# Patient Record
Sex: FEMALE | Race: AMERICAN INDIAN OR ALASKA NATIVE | HISPANIC OR LATINO | Employment: PART TIME | URBAN - METROPOLITAN AREA
[De-identification: names, ages, dates, MRNs, and addresses within clinical notes are randomized per-mention and may not be internally consistent; named-entity substitution may affect disease eponyms.]

---

## 2022-03-16 ENCOUNTER — LAB REQUISITION (OUTPATIENT)
Dept: LAB | Facility: CLINIC | Age: 27
End: 2022-03-16

## 2022-03-16 PROCEDURE — 87340 HEPATITIS B SURFACE AG IA: CPT | Performed by: INTERNAL MEDICINE

## 2022-03-16 PROCEDURE — 86706 HEP B SURFACE ANTIBODY: CPT | Performed by: INTERNAL MEDICINE

## 2022-03-16 PROCEDURE — 86481 TB AG RESPONSE T-CELL SUSP: CPT | Performed by: INTERNAL MEDICINE

## 2022-03-17 LAB
HBV SURFACE AB SERPL IA-ACNC: 363.73 M[IU]/ML
HBV SURFACE AG SERPL QL IA: NONREACTIVE

## 2022-03-18 LAB
GAMMA INTERFERON BACKGROUND BLD IA-ACNC: 0.03 IU/ML
M TB IFN-G BLD-IMP: NEGATIVE
M TB IFN-G CD4+ BCKGRND COR BLD-ACNC: 9.97 IU/ML
MITOGEN IGNF BCKGRD COR BLD-ACNC: 0.06 IU/ML
MITOGEN IGNF BCKGRD COR BLD-ACNC: 0.09 IU/ML
QUANTIFERON MITOGEN: 10 IU/ML
QUANTIFERON NIL TUBE: 0.03 IU/ML
QUANTIFERON TB1 TUBE: 0.09 IU/ML
QUANTIFERON TB2 TUBE: 0.12

## 2023-02-12 ENCOUNTER — HEALTH MAINTENANCE LETTER (OUTPATIENT)
Age: 28
End: 2023-02-12

## 2023-02-20 NOTE — PROGRESS NOTES
"Lenka Adams is a 27 year old who is being evaluated via a billable video visit.      How would you like to obtain your AVS? MyChart  If the video visit is dropped, the invitation should be resent by: Text to cell phone: [phone]  Will anyone else be joining your video visit? No  If patient encounters technical issues they should call [phone]    During this virtual visit the patient is located in [place], patient verifies this as the location during the entirety of this visit.     Video-Visit Details  Video Start Time: 11:59AM    Type of service:  Video Visit    Video End Time:12:30PM    Originating Location (pt. Location): Home  Distant Location (provider location):  Off-Site  Platform used for Video Visit: [employer]      45 minutes spent on the date of the encounter doing chart review, history and exam, documentation and further activities per the note    New Medical Weight Management Consult    PATIENT:  Lenka Adams  MRN:         [id number]  :         1995  OJHN:         2023      I had the pleasure of seeing your patient, Lenka Adams. Full intake/assessment was done to determine barriers to weight loss success and develop a treatment plan. Lenka Adams is a 27 year old female interested in treatment of medical problems associated with excess weight. She has a height of 5' 7\"[pt reported[, a weight of 306 lbs 0 oz, and the calculated Body mass index is 47.93 kg/m .        Assessment & Plan   Problem List Items Addressed This Visit        Digestive    Class 3 severe obesity with serious comorbidity and body mass index (BMI) of 45.0 to 49.9 in adult (H) - Primary     Overweight since childhood. Weight gain has been gradual. Has gained 50-60lbs in the past two years. Has been able to lose weight in the past, but is hard to lose weight and sustain weight loss. Has lost 15lbs in the past 3 months through diet change and exercise. Signficant family hisotry of " "obesity. Wants to lose weight in order to help with overall health     Sruggles with increased hunger. Struggles with getting full and staying full. Has not been satisfied since decreasing portion sizes.     Discussed AOMs today:   - Phentermine to be started to help with weight loss and hunger. Discussed side effects, risks, and benefits. Will monitor blood pressure.   - Topiramate - can consider in future. Not on birth control.   - Naltexone - can consider in the future. No history of liver disease or taking opioids   - GLP-1 - concern for injections.          Relevant Medications    phentermine (ADIPEX-P) 15 MG capsule    Other Relevant Orders    CBC with platelets    Comprehensive metabolic panel    Hemoglobin A1c    Lipid panel reflex to direct LDL Fasting    Parathyroid Hormone Intact    Vitamin D Deficiency    TSH with free T4 reflex    Primary Care Referral      1. Start Phentermine 15mg - 1 tablet daily in the morning   2. Check BP in 1-2 weeks after starting medications   3. Labs ordered   4. PCP referral placed  5. Follow up with Sofia Crowder in 1 month           Lenka Adams is a 27 year old female who presents to clinic today. She has the following co-morbidities:       2/15/2023   I have the following health issues associated with obesity: None of the above   I have the following symptoms associated with obesity: Back Pain, Fatigue     Had normal periods until the past few moths, has been more irragular lately.       Patient Goals 2/15/2023   I am interested in having a healthier weight to diminish current health problems: Yes   I am interested in having a healthier weight in order to prevent future health problems: Yes   I am interested in having a healthier weight in order to have a future surgery: No       Referring Provider 2/15/2023   Please name the provider who referred you to Medical Weight Management.  If you do not know, please answer: \"I Don't Know\". None       Weight History " 2/15/2023   How concerned are you about your weight? Very Concerned   Would you describe your weight gain as gradual? Yes   I became overweight: As a Child   The following factors have contributed to my weight gain:  Eating Wrong Types of Food, Eating Too Much, Lack of Exercise, Genetic (Runs in the Family), Stress   I have tried the following methods to lose weight: Watching Portions or Calories, Exercise, Atkins-type Diet (Low Carb/High Protein), Meal Replacements   My lowest weight since age 19 was: 250   My highest weight since age 19 was: 320   The most weight I have ever lost was: (lbs) 40   I have the following family history of obesity/being overweight:  Many of my relatives are overweight   Has anyone in your family had weight loss surgery? No   How has your weight changed over the last year?  Gained   How many pounds? 50     Overweight since childhood. Weight gain has been gradual. Had a more steep weight increase over the past 2 years. Has gained around 50-60lbs in the past 2 years. Moved to [facility] and got an active job and was able to lost 40lbs. However, once moved back to [place] regained weight, plus some. Has been able to lose 15lb in the past 3 months through low calorie diet and exercise. Is hard to lose, and sustain weight loss. Significant family history of obesity. Wants to lose weight now in order to better overall health.     Weight today - 306lbs   Highest weight in life - 320lbs     Eating 3 meals a day, with snacks. Tries to focus on proteins and lean meats. Monitors calories. Increased hunger. No thoughts of food. Struggles to get full and stay full. Previously would eat till over full, but since watching portion sizes she is hungry sooner and not able to get satisfied. No cravings. Drinks water, crystal light, sparkling water.     Activity - struggles with energy and motivation. Tries to work out 2x week around 20min. At home gym.     Has not tried AOMs in the past.     Diet Recall Review  with Patient 2/15/2023   Do you typically eat breakfast? Yes   If you do eat breakfast, what types of food do you eat? eggs & sausage, turkey sandwich, oatmeal   Do you typically eat lunch? Yes   If you do eat lunch, what types of food do you typically eat?  Salad, chicken or beef with rice & beans, soup   Do you typically eat supper? Yes   If you do eat supper, what types of food do you typically eat? Salad, chicken or beef with rice & beans, soup   Do you typically eat snacks? Yes   If you do snack, what types of food do you typically eat? Popcorn, nuts, fruit, chips, yogurt, cheese & crackers, meat & crackers   Do you like vegetables?  Yes   Do you drink water? Yes   How many glasses of juice do you drink in a typical day? 1   How many of glasses of milk do you drink in a typical day? 0   If you do drink milk, what type? N/A   How many 8oz glasses of sugar containing drinks such as Jelani-Aid/sweet tea do you drink in a day? 1   How many cans/bottles of sugar pop/soda/tea/sports drinks do you drink in a day? 1   How many cans/bottles of diet pop/soda/tea or sports drink do you drink in a day? 3   How often do you have a drink of alcohol? 2-4 Times a Month   If you do drink, how many drinks might you have in a day? 5-6       Eating Habits 2/15/2023   Generally, my meals include foods like these: bread, pasta, rice, potatoes, corn, crackers, sweet dessert, pop, or juice. A Few Times a Week   Generally, my meals include foods like these: fried meats, brats, burgers, french fries, pizza, cheese, chips, or ice cream. Once a Week   Eat fast food (like McDonalds, Burger Jayce, Taco Bell). Less Than Weekly   Eat at a buffet or sit-down restaurant. Once a Week   Eat most of my meals in front of the TV or computer. Everyday   Often skip meals, eat at random times, have no regular eating times. A Few Times a Week   Rarely sit down for a meal but snack or graze throughout.  Less Than Weekly   Eat extra snacks between meals. A  Few Times a Week   Eat most of my food at the end of the day. Never   Eat in the middle of the night or wake up at night to eat. Never   Eat extra snacks to prevent or correct low blood sugar. Never   Eat to prevent acid reflux or stomach pain. Never   Worry about not having enough food to eat. Never   Have you been to the food shelf at least a few times this year? No   I eat when I am depressed. Never   I eat when I am stressed. Less Than Weekly   I eat when I am bored. A Few Times a Week   I eat when I am anxious. Once a Week   I eat when I am happy or as a reward. Once a Week   I feel hungry all the time even if I just have eaten. Once a Week   Feeling full is important to me. A Few Times a Week   I finish all the food on my plate even if I am already full. A Few Times a Week   I can't resist eating delicious food or walk past the good food/smell. A Few Times a Week   I eat/snack without noticing that I am eating. Once a Week   I eat when I am preparing the meal. Once a Week   I eat more than usual when I see others eating. A Few Times a Week   I have trouble not eating sweets, ice cream, cookies, or chips if they are around the house. Less Than Weekly   I think about food all day. Less Than Weekly   What foods, if any, do you crave? Chips/Crackers   Please list any other foods you crave? cheese and meat       Amount of Food 2/15/2023   I make myself vomit what I have eaten or use laxatives to get rid of food. Never   I eat a large amount of food, like a loaf of bread, a box of cookies, a pint/quart of ice cream, all at once. Weekly   I eat a large amount of food even when I am not hungry. Weekly   I eat rapidly. Everyday   I eat alone because I feel embarrassed and do not want others to see how much I have eaten. Almost Everyday   I eat until I am uncomfortably full. Everyday   I feel bad, disgusted, or guilty after I overeat. Everyday   I make myself vomit what I have eaten or use laxatives to get rid of food.  Never       Activity/Exercise History 2/15/2023   How much of a typical 12 hour day do you spend sitting? Most of the Day   How much of a typical 12 hour day do you spend lying down? Less Than Half the Day   How much of a typical day do you spend walking/standing? Less Than Half the Day   How many hours (not including work) do you spend on the TV/Video Games/Computer/Tablet/Phone? 6 Hours or More   How many times a week are you active for the purpose of exercise? 2-3 Times a Week   What keeps you from being more active? Too tired, Unsure What To Do, Worried People Will Look At Me   How many total minutes do you spend doing some activity for the purpose of exercising when you exercise? 15-30 Minutes       PAST MEDICAL HISTORY:  No past medical history on file.    Work/Social History Reviewed With Patient 2/15/2023   My employment status is: Part-Time   My job is: Health Unit Coordinator   How much of your job is spent on the computer or phone? 75%   How many hours do you spend commuting to work daily?  20 mins   What is your marital status? Single   If in a relationship, is your significant other overweight? N/A   Do you have children? No   If you have children, are they overweight? N/A   Who do you live with?  with family, in the process of moving   Are they supportive of your health goals? Yes   Who does the food shopping?  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 Currently moving to [facility]. Supportive family.     ETOH - 2xmonth   No nicotine, drugs, THC     Mental Health History Reviewed With Patient 2/15/2023   Have you ever been physically or sexually abused? No   If yes, do you feel that the abuse is affecting your weight? N/A   If yes, would you like to talk to a counselor about the abuse? N/A   How often in the past 2 weeks have you felt little interest or pleasure in doing things? Not at all   Over the  "past 2 weeks how often have you felt down, depressed, or hopeless? Not at all       Sleep History Reviewed With Patient 2/15/2023   How many hours do you sleep at night? 7   Do you think that you snore loudly or has anybody ever heard you snore loudly (louder than talking or so loud it can be heard behind a shut door)? No   Has anyone seen or heard you stop breathing during your sleep? No   Do you often feel tired, fatigued, or sleepy during the day? Yes   Do you have a TV/Computer in your bedroom? No       MEDICATIONS:   Current Outpatient Medications   Medication Sig Dispense Refill     phentermine (ADIPEX-P) 15 MG capsule Take 1 capsule (15 mg) by mouth every morning 30 capsule 1       ALLERGIES:   Allergies   Allergen Reactions     Clindamycin Hives and Rash           Objective    Ht 1.702 m (5' 7\")   Wt 138.8 kg (306 lb)   BMI 47.93 kg/m    Vitals - Patient Reported  Pain Score: No Pain (0)      Vitals:  No vitals were obtained today due to virtual visit.    Physical Exam   GENERAL: Healthy, alert and no distress  EYES: Eyes grossly normal to inspection.  No discharge or erythema, or obvious scleral/conjunctival abnormalities.  RESP: No audible wheeze, cough, or visible cyanosis.  No visible retractions or increased work of breathing.    SKIN: Visible skin clear. No significant rash, abnormal pigmentation or lesions.  NEURO: Cranial nerves grossly intact.  Mentation and speech appropriate for age.  PSYCH: Mentation appears normal, affect normal/bright, judgement and insight intact, normal speech and appearance well-groomed.     Anti-obesity medication ROS:    HEENT  Hx of glaucoma: No    Cardiovascular  CAD:No  HTN:No    Gastrointestinal  GERD:No  Constipation:No  Liver Dz:No  H/O Pancreatitis:No    Psychiatric  Bipolar: No  Anxiety:No  Depression:No  History of alcohol/drug abuse: No  Hx of eating disorder:No    Endocrine  Personal or family hx of MTC or MEN2:No  Diabetes/prediabetes: No    Neurologic:  Hx " of seizures: No  Hx of migraines: No  Memory Impairment: No      History of kidney stones: No  Kidney disease: No  Current birth control: No      Sincerely,    LAWRENCE WISE PA-C   Patient baseline mental status/Alert and oriented to person, place and time/Awake

## 2023-02-21 ENCOUNTER — VIRTUAL VISIT (OUTPATIENT)
Dept: ENDOCRINOLOGY | Facility: CLINIC | Age: 28
End: 2023-02-21
Payer: COMMERCIAL

## 2023-02-21 VITALS — HEIGHT: 67 IN | BODY MASS INDEX: 45.99 KG/M2 | WEIGHT: 293 LBS

## 2023-02-21 DIAGNOSIS — E66.01 CLASS 3 SEVERE OBESITY WITH SERIOUS COMORBIDITY AND BODY MASS INDEX (BMI) OF 45.0 TO 49.9 IN ADULT, UNSPECIFIED OBESITY TYPE (H): ICD-10-CM

## 2023-02-21 DIAGNOSIS — E66.813 CLASS 3 SEVERE OBESITY WITH SERIOUS COMORBIDITY AND BODY MASS INDEX (BMI) OF 45.0 TO 49.9 IN ADULT, UNSPECIFIED OBESITY TYPE (H): ICD-10-CM

## 2023-02-21 DIAGNOSIS — Z71.3 NUTRITIONAL COUNSELING: Primary | ICD-10-CM

## 2023-02-21 DIAGNOSIS — E66.01 CLASS 3 SEVERE OBESITY WITH SERIOUS COMORBIDITY AND BODY MASS INDEX (BMI) OF 45.0 TO 49.9 IN ADULT, UNSPECIFIED OBESITY TYPE (H): Primary | ICD-10-CM

## 2023-02-21 DIAGNOSIS — E66.813 CLASS 3 SEVERE OBESITY WITH SERIOUS COMORBIDITY AND BODY MASS INDEX (BMI) OF 45.0 TO 49.9 IN ADULT, UNSPECIFIED OBESITY TYPE (H): Primary | ICD-10-CM

## 2023-02-21 PROCEDURE — 99204 OFFICE O/P NEW MOD 45 MIN: CPT | Mod: VID

## 2023-02-21 PROCEDURE — 97802 MEDICAL NUTRITION INDIV IN: CPT | Mod: VID | Performed by: DIETITIAN, REGISTERED

## 2023-02-21 RX ORDER — PHENTERMINE HYDROCHLORIDE 15 MG/1
15 CAPSULE ORAL EVERY MORNING
Qty: 30 CAPSULE | Refills: 1 | Status: SHIPPED | OUTPATIENT
Start: 2023-02-21 | End: 2023-05-17

## 2023-02-21 ASSESSMENT — PAIN SCALES - GENERAL: PAINLEVEL: NO PAIN (0)

## 2023-02-21 NOTE — LETTER
Date:February 22, 2023      Provider requested that no letter be sent. Do not send.       [facility]

## 2023-02-21 NOTE — LETTER
"2/21/2023       RE: Lenka Adams  [address] [facility] [address]     Dear Colleague,    Thank you for referring your patient, Lenka Adams, to the [facility] [place] at [facility]. Please see a copy of my visit note below.    Lenka Adams is a 27 year old female who is being evaluated via a billable video visit.      The patient has been notified of following:     \"This video visit will be conducted via a call between you and your physician/provider. We have found that certain health care needs can be provided without the need for an in-person physical exam.  This service lets us provide the care you need with a video conversation.  If a prescription is necessary we can send it directly to your pharmacy.  If lab work is needed we can place an order for that and you can then stop by our lab to have the test done at a later time.    Video visits are billed at different rates depending on your insurance coverage.  Please reach out to your insurance provider with any questions.    If during the course of the call the physician/provider feels a video visit is not appropriate, you will not be charged for this service.\"    Patient has given verbal consent for Video visit? Yes  How would you like to obtain your AVS? MyChart  If you are dropped from the video visit, the video invite should be resent to: Text to cell phone: [phone]  Will anyone else be joining your video visit? No  {If patient encounters technical issues they should call [phone]      Video-Visit Details    Type of service:  Video Visit    Video Start Time: 12:48 pm   Video End Time: 1:17 pm    Originating Location (pt. Location): Home    Distant Location (provider location):  Offsite (providers home)     Platform used for Video Visit: [employer]    During this virtual visit the patient is located in [place], patient verifies this as the " "location during the entirety of this visit.     New Weight Management Nutrition Consultation    Lenka Adams is a 27 year old female presents today for new weight management nutrition consultation.  Patient referred by Sofia Villatoro PA-C on February 21, 2023.    Patient with Co-morbidities of obesity including:  Type II DM no  Renal Failure no  Sleep apnea no  Hypertension no   Dyslipidemia no  Joint pain no  Back pain yes  GERD no     PMH: fatigue, irregular menses    Anthropometrics:  Highest weight in life: 320 lbs  Weight 2/21/23: 306 lbs with BMI 47.93    Estimated body mass index is 47.93 kg/m  as calculated from the following:    Height as of an earlier encounter on 2/21/23: 1.702 m (5' 7\").    Weight as of an earlier encounter on 2/21/23: 138.8 kg (306 lb).    Medications for Weight Loss:  Phentermine prescribed today    Supplements:  None     Fasting lab order placed today    NUTRITION HISTORY  NKFA  Some possible lactose or dairy intolerance  per pt- really likes yogurt and cheese. Does not like regular milk, prefers almond.   Cultural preferences: , lots of rice/beans/tortillas growing   Loves fish/seafood     Has never worked with a RD. No nutrition education in school but has looked up stuff online.     Pt goals: be/feel healthier, prevent future health issues    Recent 15 lb weight loss - portion control. Allowing self foods such as cake still but smaller amounts.  Trying to focus on nutrients over just eating - snacking on vegetables/fruit over chips/crackers. Realized she wasn t feeling well mentally with the way she was eating and wanted to make changes. Feels good to challenge self and achieve/make changes.    Growing up ate whatever per pt - didn t know what was nutritious vs not or what her body needed.    Per PA note    Eating 3 meals a day, with snacks. Tries to focus on proteins and lean meats. Monitors calories. Increased hunger. No thoughts of food. Struggles to get " full and stay full. Previously would eat till over full, but since watching portion sizes she is hungry sooner and not able to get satisfied. No cravings. Drinks water, crystal light, sparkling water.      Activity - struggles with energy and motivation. Tries to work out 2x week around 20min. At home gym.       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    Nutrition Diagnosis  Food and nutrition related knowledge deficit r/t lack of prior exposure to nutrition education aeb pt report and interest in learning     Obesity r/t long history of positive energy balance aeb BMI >30.    Nutrition Intervention  Reviewed current dietary habits and pts history   Discussed long-term goals pt hopes to accomplish in RD appointments  Answered pt questions  Coordination of care   Nutrition education -   Discussed macronutrient and micronutrients.   Provided examples of protein, carbohydrates and fat. Discussed types of each that are recommended more often than others for overall health.     AVS and handouts via [employer]    Patient demonstrates understanding.    Expected Engagement: good     Nutrition Resources:     Macronutrients:  Protein - want leaner protein more often than fattier protein (red meat such as beef and pork being fattier)    Carbohydrates     Fat    Micronutrients:   Vitamins   Minerals   - We obtain these from our macronutrient     Protein examples:    ? Chicken  ? Turkey  ? Fish  ? Seafood (shrimp, lobster, crab, etc)  ? Lean cuts of beef (93% ground, sirloin, tenderloin, etc)  ? Pork (limit fatter options like oreilly)  ? Cottage cheese  ? Greek yogurt  ? Eggs   ? Low-fat Cheese  ? Lentils/beans  ? Nuts/nut butter (recommend a smaller portion if doing these options - 2 Tbsp of nuts or 1/4 cup nut butter)  ? Tofu  ? Seitan     Unsaturated fat examples:    - Nuts   - Seeds   - Avocado   - Oils    - Omega 3s (from fish for example)    *Want to aim for unsaturated fats more often than saturated     Types of fats to help further clarify:   https://www.hsp.[employer].edu/nutritionsource/what-should-you-eat/fats-and-cholesterol/types-of-fat/     Carbohydrate examples:    - Brown/Wild rice   - Egg life wrap (low carb and great source of protein!)    - Whole wheat pasta   - Protein pasta (barilla protein +, lentil pasta, etc)   - Whole wheat or carb balance tortilla   - Oatmeal    - Fruit   - Starchy vegetables (corn, peas, beans/lentils, potatoes, winter squash)   - Whole grain crackers    - Whole wheat waffle    - Greencreek protein waffles/pancakes    - Couscous     Non-starchy Vegetables Examples:   - Lettuce   - Spinach   - Onions    - Bell pepper   - Carrots   - Broccoli   - Cabbage   - Brussel sprouts   - Cauliflower    - Asparagus    - Cucumber   - Artichoke   - Zucchini   - Bean sprouts   - Mushrooms   - Sugar snap peas   - Eggplant   - Turnips   - Celery   - Radishes    - Green beans    Starchy vegetable examples:  ? Corn  ? Green peas  ? Winter squash (butternut, spaghetti squash, acorn)  ? Beans/lentils (not including green beans)  ? Potatoes  ? Sweet potatoes     Follow-Up:  1 month    Time spent with patient: 29 minutes.  ABDIEL Doty, RD, LD            Again, thank you for allowing me to participate in the care of your patient.      Sincerely,    Devorah Mendoza RD

## 2023-02-21 NOTE — ASSESSMENT & PLAN NOTE
Overweight since childhood. Weight gain has been gradual. Has gained 50-60lbs in the past two years. Has been able to lose weight in the past, but is hard to lose weight and sustain weight loss. Has lost 15lbs in the past 3 months through diet change and exercise. Signficant family hisotry of obesity. Wants to lose weight in order to help with overall health     Sruggles with increased hunger. Struggles with getting full and staying full. Has not been satisfied since decreasing portion sizes.     Discussed AOMs today:   - Phentermine to be started to help with weight loss and hunger. Discussed side effects, risks, and benefits. Will monitor blood pressure.   - Topiramate - can consider in future. Not on birth control.   - Naltexone - can consider in the future. No history of liver disease or taking opioids   - GLP-1 - concern for injections.

## 2023-02-21 NOTE — PATIENT INSTRUCTIONS
Nutrition Resources:     Macronutrients:  Protein - want leaner protein more often than fattier protein (red meat such as beef and pork being fattier)    Carbohydrates     Fat    Micronutrients:   Vitamins   Minerals   - We obtain these from our macronutrient     Protein examples:   Chicken  Turkey  Fish  Seafood (shrimp, lobster, crab, etc)  Lean cuts of beef (93% ground, sirloin, tenderloin, etc)  Pork (limit fatter options like oreilly)  Cottage cheese  Greek yogurt  Eggs   Low-fat Cheese  Lentils/beans  Nuts/nut butter (recommend a smaller portion if doing these options - 2 Tbsp of nuts or 1/4 cup nut butter)  Tofu  Seitan     Unsaturated fat examples:    - Nuts   - Seeds   - Avocado   - Oils    - Omega 3s (from fish for example)    *Want to aim for unsaturated fats more often than saturated     Types of fats to help further clarify:   https://[email or web address]/nutritionsource/what-should-you-eat/fats-and-cholesterol/types-of-fat/     Carbohydrate examples:    - Brown/Wild rice   - Egg life wrap (low carb and great source of protein!)    - Whole wheat pasta   - Protein pasta (barilla protein +, lentil pasta, etc)   - Whole wheat or carb balance tortilla   - Oatmeal    - Fruit   - Starchy vegetables (corn, peas, beans/lentils, potatoes, winter squash)   - Whole grain crackers    - Whole wheat waffle    - [employer] protein waffles/pancakes    - Couscous     Non-starchy Vegetables Examples:   - Lettuce   - Spinach   - Onions    - Bell pepper   - Carrots   - Broccoli   - Cabbage   - Brussel sprouts   - Cauliflower    - Asparagus    - Cucumber   - Artichoke   - Zucchini   - Bean sprouts   - Mushrooms   - Sugar snap peas   - Eggplant   - Turnips   - Celery   - Radishes    - Green beans    Starchy vegetable examples:  Corn  Green peas  Winter squash (butternut, spaghetti squash, acorn)  Beans/lentils (not including green beans)  Potatoes  Sweet potatoes     Follow-Up:  1 month    ABDIEL Gay, RD, LD  Clinic #:  [phone]

## 2023-02-21 NOTE — NURSING NOTE
"(   Chief Complaint   Patient presents with     New Patient    )    ( Weight: 306 lb (pt reported) )  ( Height: 5' 7\" (pt reported) )  ( BMI (Calculated): 47.93 )  (   )  (   )  (   )  (   )  (   )  (   )    (   )  (   )  (   )  (   )  (   )  (   )  (   )    ( There is no problem list on file for this patient.   )  (   No current outpatient medications on file.    )  ( Diabetes Eval:    )    ( Pain Eval:  No Pain (0) )    ( Wound Eval:       )    (   History   Smoking Status     Never   Smokeless Tobacco     Never    )    ( Signed By:  Lorie Langford; February 21, 2023; 11:08 AM )    "

## 2023-02-21 NOTE — PROGRESS NOTES
"Lenka Adams is a 27 year old who is being evaluated via a billable video visit.      How would you like to obtain your AVS? MyChart  If the video visit is dropped, the invitation should be resent by: Text to cell phone: [phone]  Will anyone else be joining your video visit? No  If patient encounters technical issues they should call [phone]    During this virtual visit the patient is located in [place], patient verifies this as the location during the entirety of this visit.     Video-Visit Details  Video Start Time: {video visit start/end time for provider to select:587821}    Type of service:  Video Visit    Video End Time:{video visit start/end time for provider to select:152948}    Originating Location (pt. Location): {video visit patient location:101[address]::\"Home\"}  Distant Location (provider location):  [facility] [place]  Platform used for Video Visit: {Virtual Visit Platforms:584838::\"STEARCLEARWell\"}    Lorie Langford CMA  2/21/2023      11:08 AM    "

## 2023-02-21 NOTE — PROGRESS NOTES
"Lenka Adams is a 27 year old female who is being evaluated via a billable video visit.      The patient has been notified of following:     \"This video visit will be conducted via a call between you and your physician/provider. We have found that certain health care needs can be provided without the need for an in-person physical exam.  This service lets us provide the care you need with a video conversation.  If a prescription is necessary we can send it directly to your pharmacy.  If lab work is needed we can place an order for that and you can then stop by our lab to have the test done at a later time.    Video visits are billed at different rates depending on your insurance coverage.  Please reach out to your insurance provider with any questions.    If during the course of the call the physician/provider feels a video visit is not appropriate, you will not be charged for this service.\"    Patient has given verbal consent for Video visit? Yes  How would you like to obtain your AVS? MyChart  If you are dropped from the video visit, the video invite should be resent to: Text to cell phone: [phone]  Will anyone else be joining your video visit? No  {If patient encounters technical issues they should call [phone]      Video-Visit Details    Type of service:  Video Visit    Video Start Time: 12:48 pm   Video End Time: 1:17 pm    Originating Location (pt. Location): Home    Distant Location (provider location):  Offsite (providers home)     Platform used for Video Visit: [email or web address]    During this virtual visit the patient is located in [place], patient verifies this as the location during the entirety of this visit.     New Weight Management Nutrition Consultation    Lenka Adams is a 27 year old female presents today for new weight management nutrition consultation.  Patient referred by Sofia Villatoro PA-C on February 21, 2023.    Patient with Co-morbidities of obesity including:  Type II DM " "no  Renal Failure no  Sleep apnea no  Hypertension no   Dyslipidemia no  Joint pain no  Back pain yes  GERD no     PMH: fatigue, irregular menses    Anthropometrics:  Highest weight in life: 320 lbs  Weight 2/21/23: 306 lbs with BMI 47.93    Estimated body mass index is 47.93 kg/m  as calculated from the following:    Height as of an earlier encounter on 2/21/23: 1.702 m (5' 7\").    Weight as of an earlier encounter on 2/21/23: 138.8 kg (306 lb).    Medications for Weight Loss:  Phentermine prescribed today    Supplements:  None     Fasting lab order placed today    NUTRITION HISTORY  NKFA  Some possible lactose or dairy intolerance  per pt- really likes yogurt and cheese. Does not like regular milk, prefers almond.   Cultural preferences: , lots of rice/beans/tortillas growing   Loves fish/seafood     Has never worked with a RD. No nutrition education in school but has looked up stuff online.     Pt goals: be/feel healthier, prevent future health issues    Recent 15 lb weight loss - portion control. Allowing self foods such as cake still but smaller amounts.  Trying to focus on nutrients over just eating - snacking on vegetables/fruit over chips/crackers. Realized she wasn t feeling well mentally with the way she was eating and wanted to make changes. Feels good to challenge self and achieve/make changes.    Growing up ate whatever per pt - didn t know what was nutritious vs not or what her body needed.    Per PA note    Eating 3 meals a day, with snacks. Tries to focus on proteins and lean meats. Monitors calories. Increased hunger. No thoughts of food. Struggles to get full and stay full. Previously would eat till over full, but since watching portion sizes she is hungry sooner and not able to get satisfied. No cravings. Drinks water, crystal light, sparkling water.      Activity - struggles with energy and motivation. Tries to work out 2x week around 20min. At home gym.       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    Nutrition Diagnosis  Food and nutrition related knowledge deficit r/t lack of prior exposure to nutrition education aeb pt report and interest in learning     Obesity r/t long history of positive energy balance aeb BMI >30.    Nutrition Intervention  Reviewed current dietary habits and pts history   Discussed long-term goals pt hopes to accomplish in RD appointments  Answered pt questions  Coordination of care   Nutrition education -   Discussed macronutrient and micronutrients.   Provided examples of protein, carbohydrates and fat. Discussed types of each that are recommended more often than others for overall health.     AVS and handouts via [employer]    Patient demonstrates understanding.    Expected Engagement: good     Nutrition Resources:     Macronutrients:  Protein - want leaner protein more often than fattier protein (red meat such as beef and pork being fattier)    Carbohydrates     Fat    Micronutrients:   Vitamins   Minerals   - We obtain these from our macronutrient     Protein examples:   ? Chicken  ? Turkey  ? Fish  ? Seafood (shrimp, lobster, crab, etc)  ? Lean cuts of beef (93% ground, sirloin, tenderloin, etc)  ? Pork (limit fatter options like oreilly)  ? Cottage cheese  ? Greek yogurt  ? Eggs   ? Low-fat Cheese  ? Lentils/beans  ? Nuts/nut butter (recommend a smaller portion if doing these options - 2 Tbsp of  nuts or 1/4 cup nut butter)  ? Tofu  ? Seitan     Unsaturated fat examples:    - Nuts   - Seeds   - Avocado   - Oils    - Omega 3s (from fish for example)    *Want to aim for unsaturated fats more often than saturated     Types of fats to help further clarify:   https://[email or web address] [email or web address]/nutritionsource/what-should-you-eat/fats-and-cholesterol/types-of-fat/     Carbohydrate examples:    - Brown/Wild rice   - Egg life wrap (low carb and great source of protein!)    - Whole wheat pasta   - Protein pasta (barilla protein +, lentil pasta, etc)   - Whole wheat or carb balance tortilla   - Oatmeal    - Fruit   - Starchy vegetables (corn, peas, beans/lentils, potatoes, winter squash)   - Whole grain crackers    - Whole wheat waffle    - [place] protein waffles/pancakes    - Couscous     Non-starchy Vegetables Examples:   - Lettuce   - Spinach   - Onions    - Bell pepper   - Carrots   - Broccoli   - Cabbage   - Brussel sprouts   - Cauliflower    - Asparagus    - Cucumber   - Artichoke   - Zucchini   - Bean sprouts   - Mushrooms   - Sugar snap peas   - Eggplant   - Turnips   - Celery   - Radishes    - Green beans    Starchy vegetable examples:  ? Corn  ? Green peas  ? Winter squash (butternut, spaghetti squash, acorn)  ? Beans/lentils (not including green beans)  ? Potatoes  ? Sweet potatoes     Follow-Up:  1 month    Time spent with patient: 29 minutes.  ABDIEL Doty, RD, LD

## 2023-02-21 NOTE — LETTER
Date:February 22, 2023      Provider requested that no letter be sent. Do not send.       [facility]

## 2023-02-21 NOTE — LETTER
"2023       RE: Lenka Adams  [address] [facility] [address]     Dear Colleague,    Thank you for referring your patient, Lenka Adams, to the [facility]. Please see a copy of my visit note below.    Lenka Adams is a 27 year old who is being evaluated via a billable video visit.      How would you like to obtain your AVS? MyChart  If the video visit is dropped, the invitation should be resent by: Text to cell phone: [phone]  Will anyone else be joining your video visit? No  If patient encounters technical issues they should call [phone]    During this virtual visit the patient is located in [place], patient verifies this as the location during the entirety of this visit.     Video-Visit Details  Video Start Time: 11:59AM    Type of service:  Video Visit    Video End Time:12:30PM    Originating Location (pt. Location): Home  Distant Location (provider location):  Off-Site  Platform used for Video Visit: [facility]hab[facility]      45 minutes spent on the date of the encounter doing chart review, history and exam, documentation and further activities per the note    New Medical Weight Management Consult    PATIENT:  Lenka Adams  MRN:         [id number]  :         1995  JOHN:         2023      I had the pleasure of seeing your patient, Lenka Adams. Full intake/assessment was done to determine barriers to weight loss success and develop a treatment plan. Lenka Adams is a 27 year old female interested in treatment of medical problems associated with excess weight. She has a height of 5' 7\"[pt reported[, a weight of 306 lbs 0 oz, and the calculated Body mass index is 47.93 kg/m .        Assessment & Plan   Problem List Items Addressed This Visit        Digestive    Class 3 severe obesity with serious comorbidity and body mass index (BMI) " of 45.0 to 49.9 in adult (H) - Primary     Overweight since childhood. Weight gain has been gradual. Has gained 50-60lbs in the past two years. Has been able to lose weight in the past, but is hard to lose weight and sustain weight loss. Has lost 15lbs in the past 3 months through diet change and exercise. Signficant family hisotry of obesity. Wants to lose weight in order to help with overall health     Sruggles with increased hunger. Struggles with getting full and staying full. Has not been satisfied since decreasing portion sizes.     Discussed AOMs today:   - Phentermine to be started to help with weight loss and hunger. Discussed side effects, risks, and benefits. Will monitor blood pressure.   - Topiramate - can consider in future. Not on birth control.   - Naltexone - can consider in the future. No history of liver disease or taking opioids   - GLP-1 - concern for injections.          Relevant Medications    phentermine (ADIPEX-P) 15 MG capsule    Other Relevant Orders    CBC with platelets    Comprehensive metabolic panel    Hemoglobin A1c    Lipid panel reflex to direct LDL Fasting    Parathyroid Hormone Intact    Vitamin D Deficiency    TSH with free T4 reflex    Primary Care Referral      1. Start Phentermine 15mg - 1 tablet daily in the morning   2. Check BP in 1-2 weeks after starting medications   3. Labs ordered   4. PCP referral placed  5. Follow up with Sofia Crowder in 1 month           Lenka Adams is a 27 year old female who presents to clinic today. She has the following co-morbidities:       2/15/2023   I have the following health issues associated with obesity: None of the above   I have the following symptoms associated with obesity: Back Pain, Fatigue     Had normal periods until the past few moths, has been more irragular lately.       Patient Goals 2/15/2023   I am interested in having a healthier weight to diminish current health problems: Yes   I am interested in having a  "healthier weight in order to prevent future health problems: Yes   I am interested in having a healthier weight in order to have a future surgery: No       Referring Provider 2/15/2023   Please name the provider who referred you to Medical Weight Management.  If you do not know, please answer: \"I Don't Know\". None       Weight History 2/15/2023   How concerned are you about your weight? Very Concerned   Would you describe your weight gain as gradual? Yes   I became overweight: As a Child   The following factors have contributed to my weight gain:  Eating Wrong Types of Food, Eating Too Much, Lack of Exercise, Genetic (Runs in the Family), Stress   I have tried the following methods to lose weight: Watching Portions or Calories, Exercise, Atkins-type Diet (Low Carb/High Protein), Meal Replacements   My lowest weight since age 19 was: 250   My highest weight since age 19 was: 320   The most weight I have ever lost was: (lbs) 40   I have the following family history of obesity/being overweight:  Many of my relatives are overweight   Has anyone in your family had weight loss surgery? No   How has your weight changed over the last year?  Gained   How many pounds? 50     Overweight since childhood. Weight gain has been gradual. Had a more steep weight increase over the past 2 years. Has gained around 50-60lbs in the past 2 years. Moved to [facility] and got an active job and was able to lost 40lbs. However, once moved back to [place] regained weight, plus some. Has been able to lose 15lb in the past 3 months through low calorie diet and exercise. Is hard to lose, and sustain weight loss. Significant family history of obesity. Wants to lose weight now in order to better overall health.     Weight today - 306lbs   Highest weight in life - 320lbs     Eating 3 meals a day, with snacks. Tries to focus on proteins and lean meats. Monitors calories. Increased hunger. No thoughts of food. Struggles to get full and stay full. " Previously would eat till over full, but since watching portion sizes she is hungry sooner and not able to get satisfied. No cravings. Drinks water, crystal light, sparkling water.     Activity - struggles with energy and motivation. Tries to work out 2x week around 20min. At home gym.     Has not tried AOMs in the past.     Diet Recall Review with Patient 2/15/2023   Do you typically eat breakfast? Yes   If you do eat breakfast, what types of food do you eat? eggs & sausage, turkey sandwich, oatmeal   Do you typically eat lunch? Yes   If you do eat lunch, what types of food do you typically eat?  Salad, chicken or beef with rice & beans, soup   Do you typically eat supper? Yes   If you do eat supper, what types of food do you typically eat? Salad, chicken or beef with rice & beans, soup   Do you typically eat snacks? Yes   If you do snack, what types of food do you typically eat? Popcorn, nuts, fruit, chips, yogurt, cheese & crackers, meat & crackers   Do you like vegetables?  Yes   Do you drink water? Yes   How many glasses of juice do you drink in a typical day? 1   How many of glasses of milk do you drink in a typical day? 0   If you do drink milk, what type? N/A   How many 8oz glasses of sugar containing drinks such as Jelani-Aid/sweet tea do you drink in a day? 1   How many cans/bottles of sugar pop/soda/tea/sports drinks do you drink in a day? 1   How many cans/bottles of diet pop/soda/tea or sports drink do you drink in a day? 3   How often do you have a drink of alcohol? 2-4 Times a Month   If you do drink, how many drinks might you have in a day? 5-6       Eating Habits 2/15/2023   Generally, my meals include foods like these: bread, pasta, rice, potatoes, corn, crackers, sweet dessert, pop, or juice. A Few Times a Week   Generally, my meals include foods like these: fried meats, brats, burgers, french fries, pizza, cheese, chips, or ice cream. Once a Week   Eat fast food (like [employer], [employer], [employer]). Less Than Weekly   Eat at a buffet or sit-down restaurant. Once a Week   Eat most of my meals in front of the TV or computer. Everyday   Often skip meals, eat at random times, have no regular eating times. A Few Times a Week   Rarely sit down for a meal but snack or graze throughout.  Less Than Weekly   Eat extra snacks between meals. A Few Times a Week   Eat most of my food at the end of the day. Never   Eat in the middle of the night or wake up at night to eat. Never   Eat extra snacks to prevent or correct low blood sugar. Never   Eat to prevent acid reflux or stomach pain. Never   Worry about not having enough food to eat. Never   Have you been to the food shelf at least a few times this year? No   I eat when I am depressed. Never   I eat when I am stressed. Less Than Weekly   I eat when I am bored. A Few Times a Week   I eat when I am anxious. Once a Week   I eat when I am happy or as a reward. Once a Week   I feel hungry all the time even if I just have eaten. Once a Week   Feeling full is important to me. A Few Times a Week   I finish all the food on my plate even if I am already full. A Few Times a Week   I can't resist eating delicious food or walk past the good food/smell. A Few Times a Week   I eat/snack without noticing that I am eating. Once a Week   I eat when I am preparing the meal. Once a Week   I eat more than usual when I see others eating. A Few Times a Week   I have trouble not eating sweets, ice cream, cookies, or chips if they are around the house. Less Than Weekly   I think about food all day. Less Than Weekly   What foods, if any, do you crave? Chips/Crackers   Please list any other foods you crave? cheese and meat       Amount of Food 2/15/2023   I make myself vomit what I have eaten or use laxatives to get rid of food. Never   I eat a large amount of food, like a loaf of bread, a box of cookies, a pint/quart of ice cream, all at once. Weekly   I eat a large amount of food even when  I am not hungry. Weekly   I eat rapidly. Everyday   I eat alone because I feel embarrassed and do not want others to see how much I have eaten. Almost Everyday   I eat until I am uncomfortably full. Everyday   I feel bad, disgusted, or guilty after I overeat. Everyday   I make myself vomit what I have eaten or use laxatives to get rid of food. Never       Activity/Exercise History 2/15/2023   How much of a typical 12 hour day do you spend sitting? Most of the Day   How much of a typical 12 hour day do you spend lying down? Less Than Half the Day   How much of a typical day do you spend walking/standing? Less Than Half the Day   How many hours (not including work) do you spend on the TV/Video Games/Computer/Tablet/Phone? 6 Hours or More   How many times a week are you active for the purpose of exercise? 2-3 Times a Week   What keeps you from being more active? Too tired, Unsure What To Do, Worried People Will Look At Me   How many total minutes do you spend doing some activity for the purpose of exercising when you exercise? 15-30 Minutes       PAST MEDICAL HISTORY:  No past medical history on file.    Work/Social History Reviewed With Patient 2/15/2023   My employment status is: Part-Time   My job is: Health Unit Coordinator   How much of your job is spent on the computer or phone? 75%   How many hours do you spend commuting to work daily?  20 mins   What is your marital status? Single   If in a relationship, is your significant other overweight? N/A   Do you have children? No   If you have children, are they overweight? N/A   Who do you live with?  with family, in the process of moving   Are they supportive of your health goals? Yes   Who does the food shopping?  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 Currently moving to [facility]. Supportive family.     ETOH - 2xmonth   No nicotine, drugs, THC     Mental Health  "History Reviewed With Patient 2/15/2023   Have you ever been physically or sexually abused? No   If yes, do you feel that the abuse is affecting your weight? N/A   If yes, would you like to talk to a counselor about the abuse? N/A   How often in the past 2 weeks have you felt little interest or pleasure in doing things? Not at all   Over the past 2 weeks how often have you felt down, depressed, or hopeless? Not at all       Sleep History Reviewed With Patient 2/15/2023   How many hours do you sleep at night? 7   Do you think that you snore loudly or has anybody ever heard you snore loudly (louder than talking or so loud it can be heard behind a shut door)? No   Has anyone seen or heard you stop breathing during your sleep? No   Do you often feel tired, fatigued, or sleepy during the day? Yes   Do you have a TV/Computer in your bedroom? No       MEDICATIONS:   Current Outpatient Medications   Medication Sig Dispense Refill     phentermine (ADIPEX-P) 15 MG capsule Take 1 capsule (15 mg) by mouth every morning 30 capsule 1       ALLERGIES:   Allergies   Allergen Reactions     Clindamycin Hives and Rash           Objective     Ht 1.702 m (5' 7\")   Wt 138.8 kg (306 lb)   BMI 47.93 kg/m    Vitals - Patient Reported  Pain Score: No Pain (0)      Vitals:  No vitals were obtained today due to virtual visit.    Physical Exam   GENERAL: Healthy, alert and no distress  EYES: Eyes grossly normal to inspection.  No discharge or erythema, or obvious scleral/conjunctival abnormalities.  RESP: No audible wheeze, cough, or visible cyanosis.  No visible retractions or increased work of breathing.    SKIN: Visible skin clear. No significant rash, abnormal pigmentation or lesions.  NEURO: Cranial nerves grossly intact.  Mentation and speech appropriate for age.  PSYCH: Mentation appears normal, affect normal/bright, judgement and insight intact, normal speech and appearance well-groomed.     Anti-obesity medication ROS:    HEENT  Hx " of glaucoma: No    Cardiovascular  CAD:No  HTN:No    Gastrointestinal  GERD:No  Constipation:No  Liver Dz:No  H/O Pancreatitis:No    Psychiatric  Bipolar: No  Anxiety:No  Depression:No  History of alcohol/drug abuse: No  Hx of eating disorder:No    Endocrine  Personal or family hx of MTC or MEN2:No  Diabetes/prediabetes: No    Neurologic:  Hx of seizures: No  Hx of migraines: No  Memory Impairment: No      History of kidney stones: No  Kidney disease: No  Current birth control: No      Sincerely,    LAWRENCE WISE PA-C      Again, thank you for allowing me to participate in the care of your patient.      Sincerely,    LAWRENCE WISE PA-C

## 2023-02-22 ENCOUNTER — LAB (OUTPATIENT)
Dept: LAB | Facility: CLINIC | Age: 28
End: 2023-02-22
Payer: COMMERCIAL

## 2023-02-22 DIAGNOSIS — Z11.4 SCREENING FOR HIV (HUMAN IMMUNODEFICIENCY VIRUS): ICD-10-CM

## 2023-02-22 DIAGNOSIS — Z11.3 SCREEN FOR STD (SEXUALLY TRANSMITTED DISEASE): ICD-10-CM

## 2023-02-22 DIAGNOSIS — E66.01 CLASS 3 SEVERE OBESITY WITH SERIOUS COMORBIDITY AND BODY MASS INDEX (BMI) OF 45.0 TO 49.9 IN ADULT, UNSPECIFIED OBESITY TYPE (H): ICD-10-CM

## 2023-02-22 DIAGNOSIS — E66.813 CLASS 3 SEVERE OBESITY WITH SERIOUS COMORBIDITY AND BODY MASS INDEX (BMI) OF 45.0 TO 49.9 IN ADULT, UNSPECIFIED OBESITY TYPE (H): ICD-10-CM

## 2023-02-22 DIAGNOSIS — Z11.59 NEED FOR HEPATITIS C SCREENING TEST: ICD-10-CM

## 2023-02-22 LAB
ALBUMIN SERPL BCG-MCNC: 4.2 G/DL (ref 3.5–5.2)
ALP SERPL-CCNC: 64 U/L (ref 35–104)
ALT SERPL W P-5'-P-CCNC: 31 U/L (ref 10–35)
ANION GAP SERPL CALCULATED.3IONS-SCNC: 14 MMOL/L (ref 7–15)
AST SERPL W P-5'-P-CCNC: 22 U/L (ref 10–35)
BILIRUB SERPL-MCNC: <0.2 MG/DL
BUN SERPL-MCNC: 13 MG/DL (ref 6–20)
CALCIUM SERPL-MCNC: 9.6 MG/DL (ref 8.6–10)
CHLORIDE SERPL-SCNC: 102 MMOL/L (ref 98–107)
CHOLEST SERPL-MCNC: 184 MG/DL
CREAT SERPL-MCNC: 0.72 MG/DL (ref 0.51–0.95)
DEPRECATED HCO3 PLAS-SCNC: 23 MMOL/L (ref 22–29)
ERYTHROCYTE [DISTWIDTH] IN BLOOD BY AUTOMATED COUNT: 14 % (ref 10–15)
GFR SERPL CREATININE-BSD FRML MDRD: >90 ML/MIN/1.73M2
GLUCOSE SERPL-MCNC: 88 MG/DL (ref 70–99)
HBA1C MFR BLD: 5.6 % (ref 0–5.6)
HCT VFR BLD AUTO: 39.6 % (ref 35–47)
HDLC SERPL-MCNC: 60 MG/DL
HGB BLD-MCNC: 12.9 G/DL (ref 11.7–15.7)
LDLC SERPL CALC-MCNC: 100 MG/DL
MCH RBC QN AUTO: 27 PG (ref 26.5–33)
MCHC RBC AUTO-ENTMCNC: 32.6 G/DL (ref 31.5–36.5)
MCV RBC AUTO: 83 FL (ref 78–100)
NONHDLC SERPL-MCNC: 124 MG/DL
PLATELET # BLD AUTO: 287 10E3/UL (ref 150–450)
POTASSIUM SERPL-SCNC: 4.4 MMOL/L (ref 3.4–5.3)
PROT SERPL-MCNC: 7.5 G/DL (ref 6.4–8.3)
PTH-INTACT SERPL-MCNC: 33 PG/ML (ref 15–65)
RBC # BLD AUTO: 4.77 10E6/UL (ref 3.8–5.2)
SODIUM SERPL-SCNC: 139 MMOL/L (ref 136–145)
TRIGL SERPL-MCNC: 120 MG/DL
TSH SERPL DL<=0.005 MIU/L-ACNC: 2.7 UIU/ML (ref 0.3–4.2)
WBC # BLD AUTO: 9.7 10E3/UL (ref 4–11)

## 2023-02-22 PROCEDURE — 36415 COLL VENOUS BLD VENIPUNCTURE: CPT

## 2023-02-22 PROCEDURE — 83970 ASSAY OF PARATHORMONE: CPT

## 2023-02-22 PROCEDURE — 87389 HIV-1 AG W/HIV-1&-2 AB AG IA: CPT

## 2023-02-22 PROCEDURE — 86803 HEPATITIS C AB TEST: CPT

## 2023-02-22 PROCEDURE — 83036 HEMOGLOBIN GLYCOSYLATED A1C: CPT

## 2023-02-22 PROCEDURE — 80053 COMPREHEN METABOLIC PANEL: CPT

## 2023-02-22 PROCEDURE — 82306 VITAMIN D 25 HYDROXY: CPT

## 2023-02-22 PROCEDURE — 80061 LIPID PANEL: CPT

## 2023-02-22 PROCEDURE — 85027 COMPLETE CBC AUTOMATED: CPT

## 2023-02-22 PROCEDURE — 84443 ASSAY THYROID STIM HORMONE: CPT

## 2023-02-22 PROCEDURE — 86780 TREPONEMA PALLIDUM: CPT

## 2023-02-22 NOTE — PATIENT INSTRUCTIONS
"Thank you for allowing us the privilege of caring for you. We hope we provided you with the excellent service you deserve.   Please let us know if there is anything else we can do for you so that we can be sure you are completely satisfied with your care experience.    To ensure the quality of our services you may be receiving a patient satisfaction survey from an independent patient satisfaction monitoring company.    The greatest compliment you can give is a \"Likely to Recommend\"    Your visit was with LAWRENCE WISE PA-C today.    Instructions per today's visit:     Brayan Adams, it was great to visit with you today.  Here is a review of our visit.  If our clinic scheduler is not able to reach you please call [phone] to schedule your next appointments.    1. Start Phentermine 15mg - 1 tablet daily in the morning   2. Check BP in 1-2 weeks after starting medications. Blood pressure goal is 130/80. [facility] does offer several locations for blood pressure checks. Please follow the below link to schedule an appointment. Scheduling an appointment at the pharmacy for a blood pressure check is now preferred.Appointment Plus ([email or web address])  3. Lab have been ordered.  Please make an appointment to have them drawn at your convenience.   To schedule the Lab Appointment using [employer]:  Select \"Schedule an Appointment\"  Select \"Lab Only\"  For \"A couple of questions\", select \"Other\"  For \"Which locations work for you?, select the location and set up the appointment  To schedule by phone call [phone] to schedule a lab only appointment at any [facility] lab.  4. PCP referral placed  5. Follow up with Lawrence Crowder in 1 month       Information about Video Visits with [facility]: video visit information  _________________________________________________________________________________________________________________________________________________________  If you are asked by your " clinic team to have your blood pressure checked:  [facility] do offer several locations for blood pressure checks. Please follow the below link to schedule an appointment. Scheduling an appointment at the pharmacy for a blood pressure check is now preferred.    Appointment Plus ([email or web address])  _________________________________________________________________________________________________________________________________________________________  Important contact and scheduling information:  Please call our contact center at [phone] to schedule your next appointments.  To find a lab location near you, please call [phone].  For any nursing questions or concerns call Maribell Jay LPN at [phone] or Phyllis Cabral RN at [phone]  Please call during clinic hours Monday through Friday 8:00a - 4:00p if you have questions or you can contact us via [email or web address] at anytime and we will reply during clinic hours.    Lab results will be communicated through My Chart or letter (if My Chart not used). Please call the clinic if you have not received communication after 1 week or if you have any questions.?  Clinic Fax: [phone]    _________________________________________________________________________________________________________________________________________________________  Meal Replacement Products:    Here is the link to our new e-store where you can purchase our meal replacement products    [facility] E-Store  [facility].Uepaa/store    The one week starter kit is a great way to sample a variety of products and see what works for you.    If you want more information about the product go to: [email or web address].Fuelzee    If you are an employee or [facility] or [facility] please contact your care team for a 10% estore discount    Free Shipping for orders over $75     Benefits of meal replacements products:    Portion and calorie  control  Improved nutrition  Structured eating  Simplified food choices  Avoid contact with trigger foods  _________________________________________________________________________________________________________________________________________________________  Interested in working with a health ?  Health coaches work with you to improve your overall health and wellbeing.  They look at the whole person, and may involve discussion of different areas of life, including, but not limited to the four pillars of health (sleep, exercise, nutrition, and stress management). Discuss with your care team if you would like to start working a health .  Health Coaching-3 Pack: Schedule by calling [phone]    $99 for three health coaching visits    Visits may be done in person or via phone    Coaching is a partnership between the  and the client; Coaches do not prescribe or diagnose    Coaching helps inspire the client to reach his/her personal goals   _________________________________________________________________________________________________________________________________________________________  24 Week Healthy Lifestyle Plan:    Our mission in the 24-week Healthy Lifestyle Plan is to provide you with individualized care by giving you the tools, education and support you need to lose weight and maintain a healthy lifestyle. In your 24-week journey, you ll be supported by a dedicated weight loss team that includes registered dietitians, medical weight management providers, health coaches, and nurses -- all with special expertise in weight loss -- to help you every step of the way.     Monthly meetings with your registered dietician or medical weight management provider help to review your progress, update your care plan, and make any adjustments needed to ensure success. Between these visits, weekly and bi-weekly health  visits will help you focus on the four pillars of weight loss -- stress, sleep,  nutrition, and exercise -- and how you can best adapt each to achieve sustainable weight loss results.    In addition, you will be given exclusive access to online wellbeing classes through [facility].  Your initial visit will be with a medical weight management provider who will help to understand your weight loss goals and ensure this program is the right fit for you. Please let our team know if you are interested in the 24 week plan by sending a message to your care team or calling [phone] to schedule.  _________________________________________________________________________________________________________________________________________________________    COMPREHENSIVE WEIGHT MANAGEMENT PROGRAM  VIRTUAL SUPPORT GROUPS    For Support Group Information:      We offer support groups for patients who are working on weight loss and considering, preparing for or have had weight loss surgery.   There is no cost for this opportunity.  You are invited to attend the?Virtual Support Groups?provided by any of the following locations:    [facility] via [employer]SI[employer] Teams with Tiffany Sauer RN  2.   [place] via [employer] Teams with Leonardo Renner, PhD, LP  3.   [place] via [employer] Teams with Nika Chaudhary RN  4.   [facility] via OSIsoft Teams with Nika Myers [facility]    The following Support Group information can also be found on our website:  [email or web address]    [facility] Weight Loss Surgery Support Group    [facility] Weight Loss Surgery Support Group  The support group is a patient-lead forum that meets monthly to share experiences, encouragement and education. It is open to those who have had weight loss surgery, are scheduled for surgery, and those who are considering surgery.   WHEN: This group meets on the 3rd Wednesday of each month from 5:00PM - 6:00PM virtually using Microsoft Teams.   FACILITATOR: Led by Tiffany  "ALEJANDRA Sauer, DUDLEY, RN, the program's Clinical Coordinator.   TO REGISTER: Please contact the clinic via whistleBox or call the nurse line directly at [phone] to inform our staff that you would like an invite sent to you and to let us know the email you would like the invite sent to. Prior to the meeting, a link with directions on how to join the meeting will be sent to you.    2022 Meetings  Fatoumata 15: \"Let's Talk\" a time for the group to share.  July 20: \"Let's Talk\" a time for the group to share.  August 17: \"Let's Talk\" a time for the group to share.  September 21: \"Let's Talk\" a time for the group to share.  October 19: Guest Speaker: Dr Jonathan Rivera MD Pulmonologist and Sleep Medicine Physician, \"Getting a Good Night's Sleep\".  November 16: \"Let's Talk\" a time for the group to share.  December 21: \"Let's Talk\" a time for the group to share.    [facility] Support Groups    Connections: Bariatric Care Support Group?  This is open to all [facility] (and those external to this program) pre- and post- operative bariatric surgery patients as well as their support system.   WHEN: This group meets the 2nd Tuesday of each month from 6:30 PM - 8:00 PM virtually using Microsoft Teams.   FACILITATOR: Led by Leonardo Renner, Ph.D who is a Licensed Psychologist with the [facility] Comprehensive Weight Management Program.   TO REGISTER: Please send an email to Leonardo Renner, Ph.D., LP at?[email or web address]?if you would like an invitation to the group and to learn about using Microsoft Teams.    2022 Meetings  June 14: Nehal Dee RD, DUDLEY at [facility], \"Nutritional Labeling\"  July 12 August 2 (Please Note Date Change)  September 13 October 11 November 8 December 13    Connections: Post-Operative Bariatric Surgery Support Group  This is a support group for [facility] bariatric patients (and those external to [facility]) who have had " "bariatric surgery and are at least 3 months post-surgery.  WHEN: This support group meets the 4th Wednesday of the month from 11:00 AM - 12:00 PM virtually using Microsoft Teams.   FACILITATOR: Led by Certified Bariatric Nurse, Nika Chaudhary RN.   TO REGISTER: Please send an email to Nika at [email or web address] if you would like an invitation to the group and to learn about using Microsoft Teams.    2022 Meetings  June 22 July 27 August 24 September 28 October 26 November 23 December 28      [facility] Healthy Lifestyle Virtual Support Group    Healthy Lifestyle Virtual Support Group?  This is 60 minutes of small group guided discussion, support and resources. All are welcome who want a healthy lifestyle.  WHEN: This group meets monthly on a Friday from 12:30 PM - 1:30 PM virtually using Microsoft Teams.   FACILITATOR: Led by National Board Certified Health and , Nika Myers [facility].   TO REGISTER: Please send an email to Nika at?[email or web address] to receive monthly invites to the group or if you have any questions about having a health .  Prior to the meeting, a link with directions on how to join the meeting will be sent to you.    2022 Meetings  June 24: Nika Myers [facility], \"Setting Limits and Boundaries\".  Jul 29: Open Forum  August 26: Guest Speaker: Carisa Lester Registered Dietitian  September 30: Open Forum  October 28th: Guest Speaker: Ruthy Myers [facility], Health , \"Gratitude Practices\".  November 18: Guest Speaker: Devorah Mendoza RD Registered Dietitian, \"Navigating How to Eat around the Holidays\".  December 16: Guest Speaker: Michelle Acosta [facility], \"Changing Your Relationship with Movement\".    ____________________________________________________________________________________________________________________________________________________________________________  [place] [facility] Get Moving " Program  Our team of physical therapists is trained to help you understand and take control of your condition. They will perform a thorough evaluation to determine your ability for activity and develop a customized plan to fit your goals and physical ability.  Scheduling: Unsure if the Get Moving program is right for you? Discuss the program with your medical provider or diabetes educator. You can also call us at [phone] to ask questions or schedule an appointment.   [facility] Get Moving Program  ____________________________________________________________________________________________________________________________________________________________________________  [facility] Diabetes Prevention Program (DPP)  If you have prediabetes and Medicare please contact us via SpotMe Fitnesshart to learn more about the Diabetes Prevention Program (DPP)  Program Details:  [facility] offers the year-long Diabetes Prevention Program (DPP). The program helps you to make lifestyle changes that prevent or delay type 2 diabetes by supporting healthy eating, increased physical activity, stress reduction and use of coping skills.   On average, previous [facility] DPP cohorts have lost and maintained at least 5% of their starting weight throughout the program and averaged more than 150 minutes of physical activity per week.  Participants meet weekly for one-hour group sessions over sixteen weeks, every other week for the next 8 weeks, and monthly for the last six months.   A year-long maintenance program is also available for participants who complete the first year.   Location & Cost:   During the COVID-19 Public Health Emergency, the program is offered virtually. When in-person classes can resume, they will be held at [facility].  For people with Medicare, the program is covered in full. A self-pay option will also be available for those with non-Medicare insurance plans.    _________________________________________________________________________________________________________________________________________________________  Bluetooth Scale:    We hope to provide you with high quality virtual healthcare visits while social distancing for COVID-19 is necessary, as well as in the future when virtual visits may be more convenient for you.     Our technology team made it possible for Bluetooth scales to send weight measurements to our electronic medical record. This allows weights from you weighing at home to securely flow into the medical record, which will improve telephone and virtual visits.   Additionally, studies have shown that adults actually lose more weight when their weights are automatically sent to someone else, and also that this process is not stressful for those adults.    Below is a link for purchasing the scale, with a discount code for our patients. You may call your insurance company to see if they will reimburse you for the cost of the scale, as a piece of durable medical equipment. The scales only go up to a weight of 400 pounds. This is an issue and we are working with the developer on increasing this. We found no scales that go over 400lb that have blue-tooth for connecting to [employer].    Scale to purchase: the [employer]  Body  Scale: https://[email or web address]/us/en/body/[email or web address]=aw.ds    Discount Code: We have a discount code for our patients to bring the cost down to $50, Discount code is: [place]_Scale_20%off  _______________________________________________________________________________________________________________________________________________________________________________    To work with a Behavioral Health Psychologist:    Call to schedule:    Luis F Roach - [phone]  Madelyn Smith - [phone]  Yanira Ortiz - [phone]  Sasha Galaviz - [phone]   Ekaterina Lindo PhD  (cannot accept Medicare) [phone]        Thank you,   [facility] Comprehensive Weight Management Team                          PHENTERMINE    We are considering starting Phentermine. Take one tablet in the morning.      Phentermine is being prescribed because you identified hunger as one of the main causes for your extra weight.      Our patients on Phentermine find that they:    >feel less hunger    >find it easier to push the plate away   >have an easier time eating less    For some of our patients, these feelings are very real and immediate. For other patients, the feelings are less obvious. They don't feel much of a change but find they've lost weight. Like all weight loss medications, Phentermine  works best when you help it work. This means:  1. Having less tempting high calorie (fattening) food around the house or office. (For people with strong cravings this is very important.)   2. Staying away from situations or people that may trigger your cravings .   3. Eating out only one time or less each week.  4. Eating your meals at a table with the TV or computer off.    Side-effects. Phentermine is generally well tolerated. The main side-effects we see are feelings of racing pulse or rapid heart beat. Some people can get an elevated blood pressure. Because of this we may have you come back within a week or so of starting the medication for a blood pressure check.        For any questions or concerns please send a OMsignal message to our team or call our weight management call center at [phone] during regular business hours. For questions during evenings or weekends your messages will be addressed during the next business day.  For emergencies please call 911 or seek immediate medical care.      In order to get refills of this or any medication we prescribe you must be seen in the medical weight mgmt clinic every 2-3 months. Please have your pharmacy fax a refill request to [phone].

## 2023-02-23 LAB — DEPRECATED CALCIDIOL+CALCIFEROL SERPL-MC: 14 UG/L (ref 20–75)

## 2023-02-24 ENCOUNTER — OFFICE VISIT (OUTPATIENT)
Dept: FAMILY MEDICINE | Facility: CLINIC | Age: 28
End: 2023-02-24
Payer: COMMERCIAL

## 2023-02-24 ENCOUNTER — MYC MEDICAL ADVICE (OUTPATIENT)
Dept: FAMILY MEDICINE | Facility: CLINIC | Age: 28
End: 2023-02-24

## 2023-02-24 VITALS
HEIGHT: 67 IN | OXYGEN SATURATION: 99 % | RESPIRATION RATE: 18 BRPM | BODY MASS INDEX: 45.99 KG/M2 | TEMPERATURE: 98.7 F | DIASTOLIC BLOOD PRESSURE: 81 MMHG | WEIGHT: 293 LBS | HEART RATE: 88 BPM | SYSTOLIC BLOOD PRESSURE: 119 MMHG

## 2023-02-24 DIAGNOSIS — Z11.3 SCREEN FOR STD (SEXUALLY TRANSMITTED DISEASE): ICD-10-CM

## 2023-02-24 DIAGNOSIS — E66.01 CLASS 3 SEVERE OBESITY WITH SERIOUS COMORBIDITY AND BODY MASS INDEX (BMI) OF 45.0 TO 49.9 IN ADULT, UNSPECIFIED OBESITY TYPE (H): ICD-10-CM

## 2023-02-24 DIAGNOSIS — Z11.59 NEED FOR HEPATITIS C SCREENING TEST: ICD-10-CM

## 2023-02-24 DIAGNOSIS — Z11.4 SCREENING FOR HIV (HUMAN IMMUNODEFICIENCY VIRUS): ICD-10-CM

## 2023-02-24 DIAGNOSIS — Z20.2 EXPOSURE TO CHLAMYDIA: Primary | ICD-10-CM

## 2023-02-24 DIAGNOSIS — Z20.828 EXPOSURE TO HERPES: ICD-10-CM

## 2023-02-24 DIAGNOSIS — E66.813 CLASS 3 SEVERE OBESITY WITH SERIOUS COMORBIDITY AND BODY MASS INDEX (BMI) OF 45.0 TO 49.9 IN ADULT, UNSPECIFIED OBESITY TYPE (H): ICD-10-CM

## 2023-02-24 LAB
CLUE CELLS: PRESENT
T PALLIDUM AB SER QL: NONREACTIVE
TRICHOMONAS, WET PREP: ABNORMAL
WBC'S/HIGH POWER FIELD, WET PREP: ABNORMAL
YEAST, WET PREP: ABNORMAL

## 2023-02-24 PROCEDURE — 87591 N.GONORRHOEAE DNA AMP PROB: CPT | Mod: 59 | Performed by: FAMILY MEDICINE

## 2023-02-24 PROCEDURE — 87491 CHLMYD TRACH DNA AMP PROBE: CPT | Mod: 59 | Performed by: FAMILY MEDICINE

## 2023-02-24 PROCEDURE — 87210 SMEAR WET MOUNT SALINE/INK: CPT | Performed by: FAMILY MEDICINE

## 2023-02-24 PROCEDURE — 99203 OFFICE O/P NEW LOW 30 MIN: CPT | Performed by: FAMILY MEDICINE

## 2023-02-24 ASSESSMENT — PAIN SCALES - GENERAL: PAINLEVEL: NO PAIN (0)

## 2023-02-24 NOTE — PROGRESS NOTES
Assessment & Plan       ICD-10-CM    1. Exposure to chlamydia  Z20.2 NEISSERIA GONORRHOEA PCR     CHLAMYDIA TRACHOMATIS PCR     NEISSERIA GONORRHOEA PCR     CHLAMYDIA TRACHOMATIS PCR     NEISSERIA GONORRHOEA PCR     CHLAMYDIA TRACHOMATIS PCR     Wet prep - Clinic Collect      2. Exposure to herpes  Z20.828       3. Screen for STD (sexually transmitted disease)  Z11.3 HIV Antigen Antibody Combo     Treponema Abs w Reflex to RPR and Titer     NEISSERIA GONORRHOEA PCR     CHLAMYDIA TRACHOMATIS PCR     NEISSERIA GONORRHOEA PCR     CHLAMYDIA TRACHOMATIS PCR     NEISSERIA GONORRHOEA PCR     CHLAMYDIA TRACHOMATIS PCR     Wet prep - Clinic Collect      4. Screening for HIV (human immunodeficiency virus)  Z11.4 HIV Antigen Antibody Combo      5. Need for hepatitis C screening test  Z11.59 Hepatitis C Screen Reflex to HCV RNA Quant and Genotype      6. Class 3 severe obesity with serious comorbidity and body mass index (BMI) of 45.0 to 49.9 in adult, unspecified obesity type (H)  E66.01     Z68.42          Pt with h/o testing positive for chlamydia in 11/22 (oral test positive), treated, tested neg on recheck in Jan. Also with history of trich on pap a couple yrs ago.   No new partners or sexual activity since July/Aug '22.  Recently got text msg from '[email or web address]' that she may have been exposed to chlamydia and herpes.  No current vaginal/urinary/GI sx's.  Discussed herpes testing issues, would check if sx's, but testing not helpful if no sx's.  Will check for gc/chlam- will check vag/oral/rectal, along with screening for HIV/Hep C.  Will also check wet prep with history of trich a few yrs ago.  Will treat as indicated based on testing.  Follow-up with physical.      Nidia Serrato MD  [facility]            Snow Og is a 27 year old presenting for the following health issues:  STD      History of Present Illness       Reason for visit:  Std testing    She eats 2-3 servings  "of fruits and vegetables daily.She consumes 1 sweetened beverage(s) daily.She exercises with enough effort to increase her heart rate 10 to 19 minutes per day.  She exercises with enough effort to increase her heart rate 3 or less days per week.   She is taking medications regularly.     Last Pap was 2-3 years ago - [facility]     No sx's.  Not sexually active since July/August.  Partners in the last couple yrs- ~3.  Recently, she got an annonymous text msg, from [employer], that she may have been exposed to chlamydia and herpes.  Unsure who it was from, but wanted to get checked in case it's true.  No current vaginal, GI or urinary sx's.    [facility].  Went for oral chlamydia in Nov, no sx's, but positive test- treated for it.  (though later pt states she had mild urinary sx's prior).  She went in for follow-up gc/chlam testing in Jan, and test was negative.    3-4 yrs ago, when she got her pap, they found trich.    No other STDs in past.        Review of Systems   Constitutional, HEENT, cardiovascular, pulmonary, gi and gu systems are negative, except as otherwise noted.      Objective    /81   Pulse 88   Temp 98.7  F (37.1  C) (Tympanic)   Resp 18   Ht 1.702 m (5' 7\")   Wt 136.5 kg (301 lb)   LMP 01/19/2023   SpO2 99%   BMI 47.14 kg/m    Body mass index is 47.14 kg/m .  Physical Exam   GENERAL: healthy, alert and no distress  NECK: no adenopathy, no asymmetry, masses, or scars and thyroid normal to palpation  RESP: lungs clear to auscultation - no rales, rhonchi or wheezes  CV: regular rate and rhythm, normal S1 S2, no S3 or S4, no murmur, click or rub, no peripheral edema and peripheral pulses strong  ABDOMEN: soft, nontender, no hepatosplenomegaly, no masses and bowel sounds normal   (female): normal female external genitalia, normal urethral meatus, vaginal mucosa, normal cervix/adnexa/uterus without masses or discharge  MS: no gross musculoskeletal " defects noted, no edema      Results for orders placed or performed in visit on 02/24/23   NEISSERIA GONORRHOEA PCR     Status: Normal    Specimen: Vagina; Swab   Result Value Ref Range    Neisseria gonorrhoeae Negative Negative   CHLAMYDIA TRACHOMATIS PCR     Status: Normal    Specimen: Vagina; Swab   Result Value Ref Range    Chlamydia trachomatis Negative Negative   NEISSERIA GONORRHOEA PCR     Status: Normal    Specimen: Rectum; Swab   Result Value Ref Range    Neisseria gonorrhoeae Negative Negative   CHLAMYDIA TRACHOMATIS PCR     Status: Normal    Specimen: Rectum; Swab   Result Value Ref Range    Chlamydia trachomatis Negative Negative   NEISSERIA GONORRHOEA PCR     Status: Normal    Specimen: Throat; Swab   Result Value Ref Range    Neisseria gonorrhoeae Negative Negative   CHLAMYDIA TRACHOMATIS PCR     Status: Normal    Specimen: Throat; Swab   Result Value Ref Range    Chlamydia trachomatis Negative Negative   Wet prep - Clinic Collect     Status: Abnormal    Specimen: Vagina; Swab   Result Value Ref Range    Trichomonas Absent Absent    Yeast Absent Absent    Clue Cells Present (A) Absent    WBCs/high power field 1+ (A) None

## 2023-02-25 LAB
C TRACH DNA SPEC QL NAA+PROBE: NEGATIVE
N GONORRHOEA DNA SPEC QL NAA+PROBE: NEGATIVE

## 2023-02-27 LAB
HCV AB SERPL QL IA: NONREACTIVE
HIV 1+2 AB+HIV1 P24 AG SERPL QL IA: NONREACTIVE

## 2023-02-27 NOTE — TELEPHONE ENCOUNTER
Called pt with results-  GC/chlam for all sources all neg.  Wet prep positive for clue cells, no yeast or trich.  Pt states bleeding was mild and has resolved, and she is not having any urinary sx's now as well. Not having any increase in vaginal discharge, no vaginal odor, no vag irritation.  Suspect bleeding was from irritation from the swabs.  No sx's suspicious of BV.  Discussed txt of clue cells isn't usually indicated if no sx's, and she is okay with not treating. She is glad to here gc/chlam's are neg, and will rtc if any vaginal/urinary/bleeding sx's start up again.  CW

## 2023-02-27 NOTE — RESULT ENCOUNTER NOTE
Called and discussed results.    GC/chlam neg from all sources, reassuring.  Wet prep positive for clue cells, but she is not having any vaginal/urinary sx's.  Discussed options, will not treat.  Will RTC if symptoms occur.   Osei Serrato MD

## 2023-04-06 ENCOUNTER — VIRTUAL VISIT (OUTPATIENT)
Dept: ENDOCRINOLOGY | Facility: CLINIC | Age: 28
End: 2023-04-06
Payer: COMMERCIAL

## 2023-04-06 VITALS — WEIGHT: 293 LBS | BODY MASS INDEX: 45.99 KG/M2 | HEIGHT: 67 IN

## 2023-04-06 DIAGNOSIS — E66.01 CLASS 3 SEVERE OBESITY WITH SERIOUS COMORBIDITY AND BODY MASS INDEX (BMI) OF 45.0 TO 49.9 IN ADULT, UNSPECIFIED OBESITY TYPE (H): Primary | ICD-10-CM

## 2023-04-06 DIAGNOSIS — E66.813 CLASS 3 SEVERE OBESITY WITH SERIOUS COMORBIDITY AND BODY MASS INDEX (BMI) OF 45.0 TO 49.9 IN ADULT, UNSPECIFIED OBESITY TYPE (H): Primary | ICD-10-CM

## 2023-04-06 PROCEDURE — 99212 OFFICE O/P EST SF 10 MIN: CPT | Mod: VID

## 2023-04-06 RX ORDER — PHENTERMINE HYDROCHLORIDE 30 MG/1
30 CAPSULE ORAL EVERY MORNING
Qty: 30 CAPSULE | Refills: 3 | Status: SHIPPED | OUTPATIENT
Start: 2023-04-06 | End: 2023-06-27

## 2023-04-06 RX ORDER — NALTREXONE HYDROCHLORIDE 50 MG/1
TABLET, FILM COATED ORAL
Qty: 30 TABLET | Refills: 3 | Status: SHIPPED | OUTPATIENT
Start: 2023-04-06 | End: 2024-01-02

## 2023-04-06 ASSESSMENT — PAIN SCALES - GENERAL: PAINLEVEL: NO PAIN (0)

## 2023-04-06 NOTE — LETTER
2023       RE: Lenka Adams  [address]  [facility] [address]     Dear Colleague,    Thank you for referring your patient, Lenka Adams, to the [facility] [place] at [facility]. Please see a copy of my visit note below.    Lenka Adams is a 27 year old who is being evaluated via a billable video visit.      How would you like to obtain your AVS? MyChart  If the video visit is dropped, the invitation should be resent by: Text to cell phone: [phone]  Will anyone else be joining your video visit? No  If patient encounters technical issues they should call [phone]    During this virtual visit the patient is located in [place], patient verifies this as the location during the entirety of this visit.     Video-Visit Details  Video Start Time: 11:28AM    Type of service:  Video Visit    Video End Time:11:39AM    Originating Location (pt. Location): Home  Distant Location (provider location):  [facility]  Platform used for Video Visit: [facility] Medical Weight Management Note     Lenka Adams  MRN:  [id number]  :  1995  JOHN:  2023    Dear Physician No Ref-Primary,    I had the pleasure of seeing your patient Lenka Adams. She is a 27 year old female who I am continuing to see for treatment of obesity related to:        2/15/2023     3:39 PM   --   I have the following health issues associated with obesity None of the above   I have the following symptoms associated with obesity Back Pain    Fatigue       Assessment & Plan   Problem List Items Addressed This Visit          Digestive    Class 3 severe obesity with serious comorbidity and body mass index (BMI) of 45.0 to 49.9 in adult (H) - Primary     Seen 2023 for new MWM. Started on Phentermine. Has lost 6lbs since last visit.     Started Phentermine 15mg once  daily. Some headaches the first week of starting, however has since improved. No current side effects. BP WNL. Felt like it was helpful with hunger the first week, but has had increased hunger over the past few weeks. Is also having a lot of hunger in the evening still. Will increase Phentermine to 30mg once daily. Will monitor BP in 2 weeks.     Will start Naltrexone today to help with evening hunger. No contraindications. Discussed side effects, risks, and benefits. No history of liver disease. Not on opioids.            Relevant Medications    naltrexone (DEPADE/REVIA) 50 MG tablet    phentermine (ADIPEX-P) 30 MG capsule    Other Relevant Orders    Comprehensive metabolic panel    Vitamin D Deficiency    Parathyroid Hormone Intact      1. Increase Phentermine to 30mg in the morning. Check blood pressure in 2 weeks.   2. Start Naltrexone 50 - take 1/2 tablet once in the evening for 7 days, then increase to 1 tablet in the evening   3. Labs collected in 1 month   4. Follow up with Sofia Crowder in 3 months. Will check in via [employer] in 1 month       INTERVAL HISTORY:  [facility] - 2/21/2023  Started Phentermine       Anti-obesity medications:     Current:   Phentermine 15mg - was really helpful the first week, but not a lot since. Some headaches the first week, but has improved. No current side effects. -71.       Recent diet changes:  Getting hungry later in the evening. During the day helping with feeling null. But still feeling hungry and wanting to snack. Visit with Devorah went well, and was really helpful educational informational.     Recent exercise/activity changes: Limited due to working so much.     Recent stressors: Stable     Recent sleep changes: Sleeping well, no concerns     Vitamins/Labs: Started Vitamin D, has been taking it for around 1.5m     CURRENT WEIGHT:   300 lbs 0 oz    Initial Weight (lbs): 306 lbs  Last Visits Weight: 138.8 kg (306 lb)  Cumulative weight loss (lbs): 6  Weight Loss  "Percentage: 1.96%        4/4/2023    10:05 AM   Changes and Difficulties   With regards to my diet, I am still struggling with: feeling hungry/snacking at the end of the day   With regards to my activity/exercise, I am still struggling with: having motivation/energy         MEDICATIONS:   Current Outpatient Medications   Medication Sig Dispense Refill    naltrexone (DEPADE/REVIA) 50 MG tablet Take 1/2 tablet once daily 1-2 hours prior to worst cravings for 1 week, then increase to 1 tablet daily as directed if tolerating 30 tablet 3    phentermine (ADIPEX-P) 15 MG capsule Take 1 capsule (15 mg) by mouth every morning 30 capsule 1    phentermine (ADIPEX-P) 30 MG capsule Take 1 capsule (30 mg) by mouth every morning 30 capsule 3           4/4/2023    10:05 AM   Weight Loss Medication History Reviewed With Patient   Which weight loss medications are you currently taking on a regular basis? Phentermine           Objective    Ht 1.702 m (5' 7\")   Wt 136.1 kg (300 lb)   LMP 01/19/2023   BMI 46.99 kg/m             Vitals:  No vitals were obtained today due to virtual visit.    PHYSICAL EXAM:  GENERAL: Healthy, alert and no distress  EYES: Eyes grossly normal to inspection.  No discharge or erythema, or obvious scleral/conjunctival abnormalities.  RESP: No audible wheeze, cough, or visible cyanosis.  No visible retractions or increased work of breathing.    SKIN: Visible skin clear. No significant rash, abnormal pigmentation or lesions.  NEURO: Cranial nerves grossly intact.  Mentation and speech appropriate for age.  PSYCH: Mentation appears normal, affect normal/bright, judgement and insight intact, normal speech and appearance well-groomed.        Sincerely,    LAWRENCE WISE PA-C      18 minutes spent by me on the date of the encounter doing chart review, history and exam, documentation and further activities per the note    "

## 2023-04-06 NOTE — NURSING NOTE
"(   Chief Complaint   Patient presents with     Follow Up    )    ( Weight: 300 lb (pt reported) )  ( Height: 5' 7\" )  ( BMI (Calculated): 46.99 )  (   )  (   )  (   )  (   )  (   )  (   )    (   )  (   )  (   )  (   )  (   )  (   )  (   )    (   Patient Active Problem List   Diagnosis     Class 3 severe obesity with serious comorbidity and body mass index (BMI) of 45.0 to 49.9 in adult (H)    )  (   Current Outpatient Medications   Medication Sig Dispense Refill     phentermine (ADIPEX-P) 15 MG capsule Take 1 capsule (15 mg) by mouth every morning 30 capsule 1    )  ( Diabetes Eval:    )    ( Pain Eval:  No Pain (0) )    ( Wound Eval:       )    (   History   Smoking Status     Never   Smokeless Tobacco     Never    )    ( Signed By:  Davin Robles, EMT; April 6, 2023; 10:31 AM )  "

## 2023-04-06 NOTE — PROGRESS NOTES
Lenka Adams is a 27 year old who is being evaluated via a billable video visit.      How would you like to obtain your AVS? MyChart  If the video visit is dropped, the invitation should be resent by: Text to cell phone: [phone]  Will anyone else be joining your video visit? No  If patient encounters technical issues they should call [phone]    During this virtual visit the patient is located in [place], patient verifies this as the location during the entirety of this visit.     Video-Visit Details  Video Start Time: 11:28AM    Type of service:  Video Visit    Video End Time:11:39AM    Originating Location (pt. Location): Home  Distant Location (provider location):  [facility]  Platform used for Video Visit: [employer]      [facility] Medical Weight Management Note     Lenka Adams  MRN:  [id number]  :  1995  JOHN:  2023    Dear Physician No Ref-Primary,    I had the pleasure of seeing your patient Lenka Adams. She is a 27 year old female who I am continuing to see for treatment of obesity related to:        2/15/2023     3:39 PM   --   I have the following health issues associated with obesity None of the above   I have the following symptoms associated with obesity Back Pain    Fatigue       Assessment & Plan   Problem List Items Addressed This Visit        Digestive    Class 3 severe obesity with serious comorbidity and body mass index (BMI) of 45.0 to 49.9 in adult (H) - Primary     Seen 2023 for new MWM. Started on Phentermine. Has lost 6lbs since last visit.     Started Phentermine 15mg once daily. Some headaches the first week of starting, however has since improved. No current side effects. BP WNL. Felt like it was helpful with hunger the first week, but has had increased hunger over the past few weeks. Is also having a lot of hunger in the evening still. Will increase Phentermine to 30mg once daily. Will monitor BP in 2 weeks.      Will start Naltrexone today to help with evening hunger. No contraindications. Discussed side effects, risks, and benefits. No history of liver disease. Not on opioids.            Relevant Medications    naltrexone (DEPADE/REVIA) 50 MG tablet    phentermine (ADIPEX-P) 30 MG capsule    Other Relevant Orders    Comprehensive metabolic panel    Vitamin D Deficiency    Parathyroid Hormone Intact      1. Increase Phentermine to 30mg in the morning. Check blood pressure in 2 weeks.   2. Start Naltrexone 50 - take 1/2 tablet once in the evening for 7 days, then increase to 1 tablet in the evening   3. Labs collected in 1 month   4. Follow up with Sofia Crowder in 3 months. Will check in via [employer] in 1 month       INTERVAL HISTORY:  New MWM - 2/21/2023  Started Phentermine       Anti-obesity medications:     Current:   Phentermine 15mg - was really helpful the first week, but not a lot since. Some headaches the first week, but has improved. No current side effects. -71.       Recent diet changes:  Getting hungry later in the evening. During the day helping with feeling null. But still feeling hungry and wanting to snack. Visit with Devorah went well, and was really helpful educational informational.     Recent exercise/activity changes: Limited due to working so much.     Recent stressors: Stable     Recent sleep changes: Sleeping well, no concerns     Vitamins/Labs: Started Vitamin D, has been taking it for around 1.5m     CURRENT WEIGHT:   300 lbs 0 oz    Initial Weight (lbs): 306 lbs  Last Visits Weight: 138.8 kg (306 lb)  Cumulative weight loss (lbs): 6  Weight Loss Percentage: 1.96%        4/4/2023    10:05 AM   Changes and Difficulties   With regards to my diet, I am still struggling with: feeling hungry/snacking at the end of the day   With regards to my activity/exercise, I am still struggling with: having motivation/energy         MEDICATIONS:   Current Outpatient Medications   Medication Sig Dispense Refill  "    naltrexone (DEPADE/REVIA) 50 MG tablet Take 1/2 tablet once daily 1-2 hours prior to worst cravings for 1 week, then increase to 1 tablet daily as directed if tolerating 30 tablet 3     phentermine (ADIPEX-P) 15 MG capsule Take 1 capsule (15 mg) by mouth every morning 30 capsule 1     phentermine (ADIPEX-P) 30 MG capsule Take 1 capsule (30 mg) by mouth every morning 30 capsule 3           4/4/2023    10:05 AM   Weight Loss Medication History Reviewed With Patient   Which weight loss medications are you currently taking on a regular basis? Phentermine           Objective    Ht 1.702 m (5' 7\")   Wt 136.1 kg (300 lb)   LMP 01/19/2023   BMI 46.99 kg/m             Vitals:  No vitals were obtained today due to virtual visit.    PHYSICAL EXAM:  GENERAL: Healthy, alert and no distress  EYES: Eyes grossly normal to inspection.  No discharge or erythema, or obvious scleral/conjunctival abnormalities.  RESP: No audible wheeze, cough, or visible cyanosis.  No visible retractions or increased work of breathing.    SKIN: Visible skin clear. No significant rash, abnormal pigmentation or lesions.  NEURO: Cranial nerves grossly intact.  Mentation and speech appropriate for age.  PSYCH: Mentation appears normal, affect normal/bright, judgement and insight intact, normal speech and appearance well-groomed.        Sincerely,    LAWRENCE WISE PA-C      18 minutes spent by me on the date of the encounter doing chart review, history and exam, documentation and further activities per the note  "

## 2023-04-06 NOTE — PROGRESS NOTES
"Lenka Adams is a 27 year old who is being evaluated via a billable video visit.      How would you like to obtain your AVS? MyChart  If the video visit is dropped, the invitation should be resent by: Text to cell phone: [phone]  Will anyone else be joining your video visit? No  If patient encounters technical issues they should call [phone]    During this virtual visit the patient is located in [place], patient verifies this as the location during the entirety of this visit.     Video-Visit Details  Video Start Time: {video visit start/end time for provider to select:217426}    Type of service:  Video Visit    Video End Time:{video visit start/end time for provider to select:152948}    Originating Location (pt. Location): {video visit patient location:677030::\"Home\"}  Distant Location (provider location):  [facility] [place]  Platform used for Video Visit: {Virtual Visit Platforms:872535::\"[employer]\"}    Luis Enrique Robles, EMT-P 4/6/2023      9:18 AM    "

## 2023-04-08 NOTE — ASSESSMENT & PLAN NOTE
Seen 2/21/2023 for new MWM. Started on Phentermine. Has lost 6lbs since last visit.     Started Phentermine 15mg once daily. Some headaches the first week of starting, however has since improved. No current side effects. BP WNL. Felt like it was helpful with hunger the first week, but has had increased hunger over the past few weeks. Is also having a lot of hunger in the evening still. Will increase Phentermine to 30mg once daily. Will monitor BP in 2 weeks.     Will start Naltrexone today to help with evening hunger. No contraindications. Discussed side effects, risks, and benefits. No history of liver disease. Not on opioids.

## 2023-04-08 NOTE — PATIENT INSTRUCTIONS
"Thank you for allowing us the privilege of caring for you. We hope we provided you with the excellent service you deserve.   Please let us know if there is anything else we can do for you so that we can be sure you are completely satisfied with your care experience.    To ensure the quality of our services you may be receiving a patient satisfaction survey from an independent patient satisfaction monitoring company.    The greatest compliment you can give is a \"Likely to Recommend\"    Your visit was with LAWRENCE WISE PA-C today.    Instructions per today's visit:   1. Increase Phentermine to 30mg in the morning. Check blood pressure in 2 weeks.   2. Start Naltrexone 50 - take 1/2 tablet once in the evening for 7 days, then increase to 1 tablet in the evening   3. Labs collected in 1 month   4. Follow up with Lawrence Crowder in 3 months. Will check in via GalaDo in 1 month     _________________________________________________________________________________________________________________________________________________________  Important contact and scheduling information:  Please call our contact center at [phone] to schedule your next appointments.  To find a lab location near you, please call [phone].  For any nursing questions or concerns call Maribell Jay LPN at [phone] or Phyllis Cabral RN at [phone]  Please call during clinic hours Monday through Friday 8:00a - 4:00p if you have questions or you can contact us via Picplum at anytime and we will reply during clinic hours.    Lab results will be communicated through My Chart or letter (if My Chart not used). Please call the clinic if you have not received communication after 1 week or if you have any questions.?  Clinic Fax: [phone]  _________________________________________________________________________________________________________________________________________________________  If you are asked by your clinic team to have your " blood pressure checked:  [facility] do offer several locations for blood pressure checks. Please follow the below link to schedule an appointment. Scheduling an appointment at the pharmacy for a blood pressure check is now preferred.    Appointment Plus ([email or web address])  _________________________________________________________________________________________________________________________________________________________  Meal Replacement Products:    Here is the link to our new e-store where you can purchase our meal replacement products    [facility] E-Store  [employer]/store    The one week starter kit is a great way to sample a variety of products and see what works for you.    If you want more information about the product go to: Fresh Steps Meals  [employer]    If you are an employee or [facility] or [facility] please contact your care team for a 10% estore discount    Free Shipping for orders over $75     Benefits of meal replacements products:    Portion and calorie control  Improved nutrition  Structured eating  Simplified food choices  Avoid contact with trigger foods  _________________________________________________________________________________________________________________________________________________________  Interested in working with a health ?  Health coaches work with you to improve your overall health and wellbeing.  They look at the whole person, and may involve discussion of different areas of life, including, but not limited to the four pillars of health (sleep, exercise, nutrition, and stress management). Discuss with your care team if you would like to start working a health .  Health Coaching-3 Pack: Schedule by calling [phone]    $99 for three health coaching visits    Visits may be done in person or via phone    Coaching is a partnership between the  and the client; Coaches do not prescribe or  diagnose    Coaching helps inspire the client to reach his/her personal goals   _________________________________________________________________________________________________________________________________________________________  24 Week Healthy Lifestyle Plan:    Our mission in the 24-week Healthy Lifestyle Plan is to provide you with individualized care by giving you the tools, education and support you need to lose weight and maintain a healthy lifestyle. In your 24-week journey, you ll be supported by a dedicated weight loss team that includes registered dietitians, medical weight management providers, health coaches, and nurses -- all with special expertise in weight loss -- to help you every step of the way.     Monthly meetings with your registered dietician or medical weight management provider help to review your progress, update your care plan, and make any adjustments needed to ensure success. Between these visits, weekly and bi-weekly health  visits will help you focus on the four pillars of weight loss -- stress, sleep, nutrition, and exercise -- and how you can best adapt each to achieve sustainable weight loss results.    In addition, you will be given exclusive access to online wellbeing classes through myBestHelper.  Your initial visit will be with a medical weight management provider who will help to understand your weight loss goals and ensure this program is the right fit for you. Please let our team know if you are interested in the 24 week plan by sending a message to your care team or calling [phone] to schedule.  _________________________________________________________________________________________________________________________________________________________    COMPREHENSIVE WEIGHT MANAGEMENT PROGRAM  VIRTUAL SUPPORT GROUPS    For Support Group Information:      We offer support groups for patients who are working on weight loss and considering, preparing for or have had  "weight loss surgery.   There is no cost for this opportunity.  You are invited to attend the?Virtual Support Groups?provided by any of the following locations:    [facility] via Microsoft Teams with Tiffany Sauer RN  2.   [place] via Birthday Slam with Leonardo Renner, PhD, LP  3.   [place] via Birthday Slam with Nika Chaudhary RN  4.   [facility] via [employer] Teams with Nika Myers [facility]    The following Support Group information can also be found on our website:  [email or web address]    [facility] Weight Loss Surgery Support Group    [facility] Weight Loss Surgery Support Group  The support group is a patient-lead forum that meets monthly to share experiences, encouragement and education. It is open to those who have had weight loss surgery, are scheduled for surgery, and those who are considering surgery.   WHEN: This group meets on the 3rd Wednesday of each month from 5:00PM - 6:00PM virtually using Microsoft Teams.   FACILITATOR: Led by Tiffany Sauer, ALEJANDRA, LD, RN, the program's Clinical Coordinator.   TO REGISTER: Please contact the clinic via Raiseworks or call the nurse line directly at [phone] to inform our staff that you would like an invite sent to you and to let us know the email you would like the invite sent to. Prior to the meeting, a link with directions on how to join the meeting will be sent to you.    2022 Meetings  Fatoumata 15: \"Let's Talk\" a time for the group to share.  July 20: \"Let's Talk\" a time for the group to share.  August 17: \"Let's Talk\" a time for the group to share.  September 21: \"Let's Talk\" a time for the group to share.  October 19: Guest Speaker: Dr Jonathan Rivera MD Pulmonologist and Sleep Medicine Physician, \"Getting a Good Night's Sleep\".  November 16: \"Let's Talk\" a time for the group to share.  December 21: \"Let's Talk\" a time for the group to share.    [facility] and " "Specialty [place] Support Groups    Connections: Bariatric Care Support Group?  This is open to all [facility] (and those external to this program) pre- and post- operative bariatric surgery patients as well as their support system.   WHEN: This group meets the 2nd Tuesday of each month from 6:30 PM - 8:00 PM virtually using Microsoft Teams.   FACILITATOR: Led by Leonardo Renner, Ph.D who is a Licensed Psychologist with the [facility] Comprehensive Weight Management Program.   TO REGISTER: Please send an email to Leonardo Renner, Ph.D., LP at?[email or web address]?if you would like an invitation to the group and to learn about using Microsoft Teams.    2022 Meetings June 14: Nehal Dee, ALEJANDRA, LD at [facility], \"Nutritional Labeling\"  July 12 August 2 (Please Note Date Change)  September 13 October 11 November 8 December 13    Connections: Post-Operative Bariatric Surgery Support Group  This is a support group for [facility] bariatric patients (and those external to [facility]) who have had bariatric surgery and are at least 3 months post-surgery.  WHEN: This support group meets the 4th Wednesday of the month from 11:00 AM - 12:00 PM virtually using Microsoft Teams.   FACILITATOR: Led by Certified Bariatric Nurse, Nika Chaudhary RN.   TO REGISTER: Please send an email to Nika at [email or web address] if you would like an invitation to the group and to learn about using Microsoft Teams.    2022 Meetings June 22 July 27 August 24 September 28 October 26 November 23 December 28      [facility] Healthy Lifestyle Virtual Support Group    Healthy Lifestyle Virtual Support Group?  This is 60 minutes of small group guided discussion, support and resources. All are welcome who want a healthy lifestyle.  WHEN: This group meets monthly on a Friday from 12:30 PM - 1:30 PM virtually using Microsoft Teams.   FACILITATOR: Led " "by National Board Certified Health and , Nika Myers [facility].   TO REGISTER: Please send an email to Nika at?[email or web address] to receive monthly invites to the group or if you have any questions about having a health .  Prior to the meeting, a link with directions on how to join the meeting will be sent to you.    2022 Meetings  June 24: Nika Myers [facility], \"Setting Limits and Boundaries\".  Jul 29: Open Forum  August 26: Guest Speaker: Carisa Lester Registered Dietitian  September 30: Open Forum  October 28th: Guest Speaker: Ruthy Myers [facility], Health , \"Gratitude Practices\".  November 18: Guest Speaker: Devorah Mendoza RD Registered Dietitian, \"Navigating How to Eat around the Holidays\".  December 16: Guest Speaker: Michelle Acosta [facility], \"Changing Your Relationship with Movement\".    ____________________________________________________________________________________________________________________________________________________________________________  [facility] Get Moving Program  Our team of physical therapists is trained to help you understand and take control of your condition. They will perform a thorough evaluation to determine your ability for activity and develop a customized plan to fit your goals and physical ability.  Scheduling: Unsure if the Get Moving program is right for you? Discuss the program with your medical provider or diabetes educator. You can also call us at [phone] to ask questions or schedule an appointment.   [facility] Get Moving Program  ____________________________________________________________________________________________________________________________________________________________________________  [facility] Diabetes Prevention Program (DPP)  If you have prediabetes and Medicare please contact us via Charlesharfred to learn more about the Diabetes Prevention Program (DPP)  Program Details:  [facility] offers " the year-long Diabetes Prevention Program (DPP). The program helps you to make lifestyle changes that prevent or delay type 2 diabetes by supporting healthy eating, increased physical activity, stress reduction and use of coping skills.   On average, previous [facility] DPP cohorts have lost and maintained at least 5% of their starting weight throughout the program and averaged more than 150 minutes of physical activity per week.  Participants meet weekly for one-hour group sessions over sixteen weeks, every other week for the next 8 weeks, and monthly for the last six months.   A year-long maintenance program is also available for participants who complete the first year.   Location & Cost:   During the COVID-19 Public Health Emergency, the program is offered virtually. When in-person classes can resume, they will be held at [facility].  For people with Medicare, the program is covered in full. A self-pay option will also be available for those with non-Medicare insurance plans.   _________________________________________________________________________________________________________________________________________________________  Bluetooth Scale:    We hope to provide you with high quality virtual healthcare visits while social distancing for COVID-19 is necessary, as well as in the future when virtual visits may be more convenient for you.     Our technology team made it possible for Bluetooth scales to send weight measurements to our electronic medical record. This allows weights from you weighing at home to securely flow into the medical record, which will improve telephone and virtual visits.   Additionally, studies have shown that adults actually lose more weight when their weights are automatically sent to someone else, and also that this process is not stressful for those adults.    Below is a link for purchasing the scale, with a discount code for our patients. You  may call your insurance company to see if they will reimburse you for the cost of the scale, as a piece of durable medical equipment. The scales only go up to a weight of 400 pounds. This is an issue and we are working with the developer on increasing this. We found no scales that go over 400lb that have blue-tooth for connecting to [employer].    Scale to purchase: the XtremIO  Body  Scale: [email or web address]=aw.ds    Discount Code: We have a discount code for our patients to bring the cost down to $50, Discount code is: [place]_Scale_20%off  _______________________________________________________________________________________________________________________________________________________________________________    To work with a Behavioral Health Psychologist:    Call to schedule:    Luis F Roach - [phone]  Madelyn Smith - [phone]  Yanira Ortiz - [phone]  Sasha Galaviz - [phone]   Ekaterina Lindo PhD (cannot accept Medicare) [phone]        Thank you,   [facility] Comprehensive Weight Management Team                          NALTREXONE    We are considering starting Naltrexone. Start with 1/2 tab 1-2 hours prior to the time you have the most trouble with cravings or extra hunger. If you are doing well you may switch to a whole tablet taken at the same time period.     WARNING: This medication blocks the action of opioid type pain medications. If you routinely take any medication like Codeine, Oxycontin,Percocet,Morphine,Dilaudid or Methodone, do not take this until you have talked with weight management staff. If you are planning surgery you should stop Naltrexone 4 days prior to the surgery. If you have an injury that requires pain medication, make sure the health care staff knows you take Naltrexone.     Naltrexone is a medication that is used most often to help people who are  "troubled by dependence on prescription pain killers or alcohol. It has also been found to help with weight loss. Although it's not currently FDA approved for weight loss, it has been used safely for a number of years to help people who are carrying extra weight.     Just how Naltrexone helps with weight loss has not been exactly determined.  It seems to work by quieting down brain signals related to strong food cravings. Many of our patients use the word \"addiction\" to describe their feelings and constant thoughts about food. It makes sense then to treat the feeling of dependence on food, outside of real hunger, with a medication designed to help with other sorts of dependence.     Our patients on Naltrexone find that they:    >feel less interest in food   >think less about food and eating and have more time to think of other things   >find it easier to push the plate away   >have an easier time eating less    For some of our patients, these feelings are very immediate. Other patients, don't feel much of a change but find they've lost weight. Like all weight loss medications, Naltrexone works best when you help it work. This means:  1. Having less tempting high calorie (fattening) food around the house or office. (For people with strong cravings this is very important.)   2. Staying away from situations or people that may trigger your cravings .   3. Eating out only one time or less each week.  4. Eating your meals at a table with the TV or computer off.    Side-effects. Naltrexone is generally well tolerated. The main side-effect we see is  nausea or a woozy feeling. A small number of people feel quite ill. Most people have a mild reaction and some people have no reaction at all.  The good news is that this feeling does go away.     In order to avoid nausea, please start the medication with half a pill for the first few days. Go on to a full pill if you are feeling well.      If you  are nauseated on 1/2 a pill it " is okay to cut back to 1/4 pill ( a very small amount). Take this for a couple of days and work your way back up to a 1/2 pill and then a whole pill. Taking the medication at night or with food  to start also may help prevent the feeling of nausea.       For any questions or concerns please send a Keypr message to our team or call our weight management call center at [phone] during regular business hours. For questions during evenings or weekends your messages will be addressed during the next business day.  For emergencies please call 911 or seek immediate medical care.     (Do not stop taking it if you don't think it's working. For some people it works without them knowing it.)      Please refer to the pharmacy insert for more information on side-effects. Since many pharmacists are not familiar with the use of naltrexone in weight loss, calling the nurse at [phone] will get you the most accurate information.  In order to get refills of this or any medication we prescribe you must be seen in the medical weight mgmt clinic every 2-3 months. Please have your pharmacy fax a refill request to [phone].

## 2023-04-26 ENCOUNTER — VIRTUAL VISIT (OUTPATIENT)
Dept: ENDOCRINOLOGY | Facility: CLINIC | Age: 28
End: 2023-04-26
Payer: COMMERCIAL

## 2023-04-26 DIAGNOSIS — E66.813 CLASS 3 SEVERE OBESITY WITH SERIOUS COMORBIDITY AND BODY MASS INDEX (BMI) OF 45.0 TO 49.9 IN ADULT, UNSPECIFIED OBESITY TYPE (H): ICD-10-CM

## 2023-04-26 DIAGNOSIS — E66.01 CLASS 3 SEVERE OBESITY WITH SERIOUS COMORBIDITY AND BODY MASS INDEX (BMI) OF 45.0 TO 49.9 IN ADULT, UNSPECIFIED OBESITY TYPE (H): ICD-10-CM

## 2023-04-26 DIAGNOSIS — Z71.3 NUTRITIONAL COUNSELING: Primary | ICD-10-CM

## 2023-04-26 PROCEDURE — 99207 PR NO CHARGE LOS: CPT | Mod: 93 | Performed by: DIETITIAN, REGISTERED

## 2023-04-26 PROCEDURE — 97803 MED NUTRITION INDIV SUBSEQ: CPT | Mod: 93 | Performed by: DIETITIAN, REGISTERED

## 2023-04-26 NOTE — LETTER
"4/26/2023       RE: Lenka Adams  [address]  [facility] [address]     Dear Colleague,    Thank you for referring your patient, Lenka Adams, to the [facility] [place] at [facility]. Please see a copy of my visit note below.    Lenka Adams is a 27 year old female who is being evaluated via a billable telephone visit.     The patient has been notified of following:     \"This telephone visit will be conducted via a call between you and your physician/provider. We have found that certain health care needs can be provided without the need for a physical exam.  This service lets us provide the care you need with a short phone conversation.  If a prescription is necessary we can send it directly to your pharmacy.  If lab work is needed we can place an order for that and you can then stop by our lab to have the test done at a later time.    Telephone visits are billed at different rates depending on your insurance coverage. During this emergency period, for some insurers they may be billed the same as an in-person visit.  Please reach out to your insurance provider with any questions.    If during the course of the call the physician/provider feels a telephone visit is not appropriate, you will not be charged for this service.\"    Patient has given verbal consent for Telephone visit?  Yes    How would you like to obtain your AVS? Josefat    Phone call duration: 22 minutes    Distant Location (provider location):  Offsite (providers home)     During this virtual visit the patient is located in [place], patient verifies this as the location during the entirety of this visit.     Weight Management Nutrition Consultation    Lenka Adams is a 27 year old female presents today for weight management nutrition consultation.  Patient referred by Sofia Villatoro PA-C on February 21, 2023.    Patient " "with Co-morbidities of obesity including:  Type II DM no  Renal Failure no  Sleep apnea no  Hypertension no   Dyslipidemia no  Joint pain no  Back pain yes  GERD no     PMH: fatigue, irregular menses    Anthropometrics:  Highest weight in life: 320 lbs  Weight 2/21/23: 306 lbs with BMI 47.93    Estimated body mass index is 46.99 kg/m  as calculated from the following:    Height as of 4/6/23: 1.702 m (5' 7\").    Weight as of 4/6/23: 136.1 kg (300 lb).     Current: 300 lbs 4/6/23. Has not checked since    Medications for Weight Loss:  Phentermine  Naltrexone added 4/6/23 per chart review    Supplements:  Vit D 5,000 international unit(s)/day - added after labs 2/22/23    Labs 2/22/23  Vit D 14  PTH WNL  - Will re-check Vit D and PTH beginning of May    NUTRITION HISTORY  NKFA  Some possible lactose or dairy intolerance  per pt- really likes yogurt and cheese. Does not like regular milk, prefers almond.   Cultural preferences: , lots of rice/beans/tortillas growing   Loves fish/seafood     Has never worked with a RD. No nutrition education in school but has looked up stuff online.     Pt goals: be/feel healthier, prevent future health issues    Recent 15 lb weight loss - portion control. Allowing self foods such as cake still but smaller amounts.  Trying to focus on nutrients over just eating - snacking on vegetables/fruit over chips/crackers. Realized she wasn t feeling well mentally with the way she was eating and wanted to make changes. Feels good to challenge self and achieve/make changes.    Growing up ate whatever per pt - didn t know what was nutritious vs not or what her body needed.    Per PA note    Eating 3 meals a day, with snacks. Tries to focus on proteins and lean meats. Monitors calories. Increased hunger. No thoughts of food. Struggles to get full and stay full. Previously would eat till over full, but since watching portion sizes she is hungry sooner and not able to get satisfied. No cravings. " Drinks water, crystal light, sparkling water.      Activity - struggles with energy and motivation. Tries to work out 2x week around 20min. At home gym.       Typical foods per questionnaire  B - eggs & sausage, turkey sandwich, oatmeal  L - Salad, chicken or beef with rice & beans, soup  D - same as lunch  Snacks - popcorn, nuts, fruit, chips, yogurt, cheese/crackers, meat/crackers     April 2023:    Pt late for appt today. Time cut short.    Pt curious about portion sizes. Discussed. Has been tracking kcal - answered questions. At first noticed she was losing, now stabilizing. Trying to keep around 2000 kcal    Working on more vegetables and protein and less carbohydrates per pt.   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    Nutrition Diagnosis  Food and nutrition related knowledge deficit r/t lack of prior exposure to nutrition education aeb pt report and interest in learning     Obesity r/t long history of positive energy balance aeb BMI >30.    Nutrition Intervention  Assessed learning type: [place]  Reviewed previous information  Answered pt questions  Coordination of care   Nutrition education -   Discussed macronutrient and micronutrients.   Provided examples of protein, carbohydrates and fat. Discussed types of each that are recommended more often than others for overall health.     AVS and handouts via  Jaelyn    Patient demonstrates understanding.    Expected Engagement: good     Nutrition Resources:     Macronutrients:  Protein - want leaner protein more often than fattier protein (red meat such as beef and pork being fattier)    Carbohydrates     Fat    Micronutrients:   Vitamins   Minerals   - We obtain these from our macronutrient     Protein examples:   Chicken  Turkey  Fish  Seafood (shrimp, lobster, crab, etc)  Lean cuts of beef (93% ground, sirloin, tenderloin, etc)  Pork (limit fatter options like oreilly)  Cottage cheese  Greek yogurt  Eggs   Low-fat Cheese  Lentils/beans  Nuts/nut butter (recommend a smaller portion if doing these options - 2 Tbsp of nuts or 1/4 cup nut butter)  Tofu  Seitan     Unsaturated fat examples:    - Nuts   - Seeds   - Avocado   - Oils    - Omega 3s (from fish for example)    *Want to aim for unsaturated fats more often than saturated     Types of fats to help further clarify:   https://[email or web address]/nutritionsource/what-should-you-eat/fats-and-cholesterol/types-of-fat/     Carbohydrate examples:    - Brown/Wild rice   - Egg life wrap (low carb and great source of protein!)    - Whole wheat pasta   - Protein pasta (barilla protein +, lentil pasta, etc)   - Whole wheat or carb balance tortilla   - Oatmeal    - Fruit   - Starchy vegetables (corn, peas, beans/lentils, potatoes, winter squash)   - Whole grain crackers    - Whole wheat waffle    - [place] protein waffles/pancakes    - Couscous     Non-starchy Vegetables Examples:   - Lettuce   - Spinach   - Onions    - Bell pepper   - Carrots   - Broccoli   - Cabbage   - Brussel sprouts   - Cauliflower    - Asparagus    - Cucumber   - Artichoke   - Zucchini   - Bean sprouts   - Mushrooms   - Sugar snap peas   - Eggplant   - Turnips   - Celery   - Radishes    - Green beans    Starchy vegetable examples:  Corn  Green peas  Winter squash (butternut, spaghetti squash, acorn)  Beans/lentils (not including green  beans)  Potatoes  Sweet potatoes     Plate method can be used for general guidance on balanced meals/portion sizes (see link below for picture/more information)                 Make 1/2 (1 cup+) your plate non starchy vegetable(cauliflower, broccoli, asparagus, [place] sprouts, lettuce, carrots, for example).                3+ oz lean protein sources (salmon/skinless chicken/turkey breast/pork loin/lean cuts of beef/ or non-animal protein such as black, kidney or freeman beans, tofu/edamame/tempeh)     (Note: 3 oz = deck of cards size)                 1/2 to 1 cup carbohydrate choices such as whole grain starches or starchy vegetables or fruit. For example: quinoa, brown rice, barley, potatoes, sweet potatoes, winter squash, peas, corn, or fruit.               Choose ~0-2 added fat servings at a meal (avocados, nut butters, nuts or seeds, olive oil, vegetable oils).    The Plate Method:  https://www.cdc.gov/diabetes/images/managing/Diabetes-Manage-Eat-Well-Plate-Graphic_600px.jpg    Follow-Up:  Wed, June 14th at 2:00 pm    Time spent with patient: 22 minutes.  ABDIEL Doty, RD, LD

## 2023-04-26 NOTE — PATIENT INSTRUCTIONS
Nutrition Resources:     Macronutrients:  Protein - want leaner protein more often than fattier protein (red meat such as beef and pork being fattier)    Carbohydrates     Fat    Micronutrients:   Vitamins   Minerals   - We obtain these from our macronutrient     Protein examples:   Chicken  Turkey  Fish  Seafood (shrimp, lobster, crab, etc)  Lean cuts of beef (93% ground, sirloin, tenderloin, etc)  Pork (limit fatter options like oreilly)  Cottage cheese  Greek yogurt  Eggs   Low-fat Cheese  Lentils/beans  Nuts/nut butter (recommend a smaller portion if doing these options - 2 Tbsp of nuts or 1/4 cup nut butter)  Tofu  Seitan     Unsaturated fat examples:    - Nuts   - Seeds   - Avocado   - Oils    - Omega 3s (from fish for example)    *Want to aim for unsaturated fats more often than saturated     Types of fats to help further clarify:   https://[email or web address]/nutritionsource/what-should-you-eat/fats-and-cholesterol/types-of-fat/     Carbohydrate examples:    - Brown/Wild rice   - Egg life wrap (low carb and great source of protein!)    - Whole wheat pasta   - Protein pasta (barilla protein +, lentil pasta, etc)   - Whole wheat or carb balance tortilla   - Oatmeal    - Fruit   - Starchy vegetables (corn, peas, beans/lentils, potatoes, winter squash)   - Whole grain crackers    - Whole wheat waffle    - [place] protein waffles/pancakes    - Couscous     Non-starchy Vegetables Examples:   - Lettuce   - Spinach   - Onions    - Bell pepper   - Carrots   - Broccoli   - Cabbage   - Brussel sprouts   - Cauliflower    - Asparagus    - Cucumber   - Artichoke   - Zucchini   - Bean sprouts   - Mushrooms   - Sugar snap peas   - Eggplant   - Turnips   - Celery   - Radishes    - Green beans    Starchy vegetable examples:  Corn  Green peas  Winter squash (butternut, spaghetti squash, acorn)  Beans/lentils (not including green beans)  Potatoes  Sweet potatoes     Plate method can be used for general guidance on balanced  meals/portion sizes (see link below for picture/more information)                 Make 1/2 (1 cup+) your plate non starchy vegetable(cauliflower, broccoli, asparagus, [place] sprouts, lettuce, carrots, for example).                3+ oz lean protein sources (salmon/skinless chicken/turkey breast/pork loin/lean cuts of beef/ or non-animal protein such as black, kidney or freeman beans, tofu/edamame/tempeh)     (Note: 3 oz = deck of cards size)                 1/2 to 1 cup carbohydrate choices such as whole grain starches or starchy vegetables or fruit. For example: quinoa, brown rice, barley, potatoes, sweet potatoes, winter squash, peas, corn, or fruit.               Choose ~0-2 added fat servings at a meal (avocados, nut butters, nuts or seeds, olive oil, vegetable oils).    The Plate Method:  https://www.cdc.gov/diabetes/images/managing/Diabetes-Manage-Eat-Well-Plate-Graphic_600px.jpg    Follow-Up:  Wed, June 14th at 2:00 pm    ABDIEL Gay, RD, LD  Clinic #: [phone]

## 2023-04-26 NOTE — PROGRESS NOTES
"Lenka Adams is a 27 year old female who is being evaluated via a billable telephone visit.     The patient has been notified of following:     \"This telephone visit will be conducted via a call between you and your physician/provider. We have found that certain health care needs can be provided without the need for a physical exam.  This service lets us provide the care you need with a short phone conversation.  If a prescription is necessary we can send it directly to your pharmacy.  If lab work is needed we can place an order for that and you can then stop by our lab to have the test done at a later time.    Telephone visits are billed at different rates depending on your insurance coverage. During this emergency period, for some insurers they may be billed the same as an in-person visit.  Please reach out to your insurance provider with any questions.    If during the course of the call the physician/provider feels a telephone visit is not appropriate, you will not be charged for this service.\"    Patient has given verbal consent for Telephone visit?  Yes    How would you like to obtain your AVS? MyChart    Phone call duration: 22 minutes    Distant Location (provider location):  Offsite (providers home)     During this virtual visit the patient is located in [place], patient verifies this as the location during the entirety of this visit.     Weight Management Nutrition Consultation    Lenka Adams is a 27 year old female presents today for weight management nutrition consultation.  Patient referred by Sofia Villatoro PA-C on February 21, 2023.    Patient with Co-morbidities of obesity including:  Type II DM no  Renal Failure no  Sleep apnea no  Hypertension no   Dyslipidemia no  Joint pain no  Back pain yes  GERD no     PMH: fatigue, irregular menses    Anthropometrics:  Highest weight in life: 320 lbs  Weight 2/21/23: 306 lbs with BMI 47.93    Estimated body mass index is 46.99 kg/m  as " "calculated from the following:    Height as of 4/6/23: 1.702 m (5' 7\").    Weight as of 4/6/23: 136.1 kg (300 lb).     Current: 300 lbs 4/6/23. Has not checked since    Medications for Weight Loss:  Phentermine  Naltrexone added 4/6/23 per chart review    Supplements:  Vit D 5,000 international unit(s)/day - added after labs 2/22/23    Labs 2/22/23  Vit D 14  PTH WNL  - Will re-check Vit D and PTH beginning of May    NUTRITION HISTORY  NKFA  Some possible lactose or dairy intolerance  per pt- really likes yogurt and cheese. Does not like regular milk, prefers almond.   Cultural preferences: , lots of rice/beans/tortillas growing   Loves fish/seafood     Has never worked with a RD. No nutrition education in school but has looked up stuff online.     Pt goals: be/feel healthier, prevent future health issues    Recent 15 lb weight loss - portion control. Allowing self foods such as cake still but smaller amounts.  Trying to focus on nutrients over just eating - snacking on vegetables/fruit over chips/crackers. Realized she wasn t feeling well mentally with the way she was eating and wanted to make changes. Feels good to challenge self and achieve/make changes.    Growing up ate whatever per pt - didn t know what was nutritious vs not or what her body needed.    Per PA note    Eating 3 meals a day, with snacks. Tries to focus on proteins and lean meats. Monitors calories. Increased hunger. No thoughts of food. Struggles to get full and stay full. Previously would eat till over full, but since watching portion sizes she is hungry sooner and not able to get satisfied. No cravings. Drinks water, crystal light, sparkling water.      Activity - struggles with energy and motivation. Tries to work out 2x week around 20min. At home gym.       Typical foods per questionnaire  B - eggs & sausage, turkey sandwich, oatmeal  L - Salad, chicken or beef with rice & beans, soup  D - same as lunch  Snacks - popcorn, nuts, fruit, " chips, yogurt, cheese/crackers, meat/crackers     April 2023:    Pt late for appt today. Time cut short.    Pt curious about portion sizes. Discussed. Has been tracking kcal - answered questions. At first noticed she was losing, now stabilizing. Trying to keep around 2000 kcal    Working on more vegetables and protein and less carbohydrates per pt.   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    Nutrition Diagnosis  Food and nutrition related knowledge deficit r/t lack of prior exposure to nutrition education aeb pt report and interest in learning     Obesity r/t long history of positive energy balance aeb BMI >30.    Nutrition Intervention  Assessed learning type: [place]  Reviewed previous information  Answered pt questions  Coordination of care   Nutrition education -   Discussed macronutrient and micronutrients.   Provided examples of protein, carbohydrates and fat. Discussed types of each that are recommended more often than others for overall health.     AVS and handouts via [employer]    Patient demonstrates understanding.    Expected Engagement: good     Nutrition Resources:     Macronutrients:  Protein - want leaner protein more often than fattier protein (red meat such as beef and pork being fattier)    Carbohydrates     Fat    Micronutrients:   Vitamins   Minerals   - We obtain these from our macronutrient      Protein examples:   ? Chicken  ? Turkey  ? Fish  ? Seafood (shrimp, lobster, crab, etc)  ? Lean cuts of beef (93% ground, sirloin, tenderloin, etc)  ? Pork (limit fatter options like oreilly)  ? Cottage cheese  ? Greek yogurt  ? Eggs   ? Low-fat Cheese  ? Lentils/beans  ? Nuts/nut butter (recommend a smaller portion if doing these options - 2 Tbsp of nuts or 1/4 cup nut butter)  ? Tofu  ? Seitan     Unsaturated fat examples:    - Nuts   - Seeds   - Avocado   - Oils    - Omega 3s (from fish for example)    *Want to aim for unsaturated fats more often than saturated     Types of fats to help further clarify:   https://[email or web address]/nutritionsource/what-should-you-eat/fats-and-cholesterol/types-of-fat/     Carbohydrate examples:    - Brown/Wild rice   - Egg life wrap (low carb and great source of protein!)    - Whole wheat pasta   - Protein pasta (barilla protein +, lentil pasta, etc)   - Whole wheat or carb balance tortilla   - Oatmeal    - Fruit   - Starchy vegetables (corn, peas, beans/lentils, potatoes, winter squash)   - Whole grain crackers    - Whole wheat waffle    - [place] protein waffles/pancakes    - Couscous     Non-starchy Vegetables Examples:   - Lettuce   - Spinach   - Onions    - Bell pepper   - Carrots   - Broccoli   - Cabbage   - Brussel sprouts   - Cauliflower    - Asparagus    - Cucumber   - Artichoke   - Zucchini   - Bean sprouts   - Mushrooms   - Sugar snap peas   - Eggplant   - Turnips   - Celery   - Radishes    - Green beans    Starchy vegetable examples:  ? Corn  ? Green peas  ? Winter squash (butternut, spaghetti squash, acorn)  ? Beans/lentils (not including green beans)  ? Potatoes  ? Sweet potatoes     Plate method can be used for general guidance on balanced meals/portion sizes (see link below for picture/more information)                 Make 1/2 (1 cup+) your plate non starchy vegetable(cauliflower, broccoli, asparagus, [place] sprouts, lettuce, carrots, for  example).                3+ oz lean protein sources (salmon/skinless chicken/turkey breast/pork loin/lean cuts of beef/ or non-animal protein such as black, kidney or freeman beans, tofu/edamame/tempeh)     (Note: 3 oz = deck of cards size)                 1/2 to 1 cup carbohydrate choices such as whole grain starches or starchy vegetables or fruit. For example: quinoa, brown rice, barley, potatoes, sweet potatoes, winter squash, peas, corn, or fruit.               Choose ~0-2 added fat servings at a meal (avocados, nut butters, nuts or seeds, olive oil, vegetable oils).    The Plate Method:  https://www.cdc.gov/diabetes/images/managing/Diabetes-Manage-Eat-Well-Plate-Graphic_600px.jpg    Follow-Up:  Wed, June 14th at 2:00 pm    Time spent with patient: 22 minutes.  ABDIEL Doty, RD, LD

## 2023-05-04 ENCOUNTER — LAB (OUTPATIENT)
Dept: LAB | Facility: CLINIC | Age: 28
End: 2023-05-04
Payer: COMMERCIAL

## 2023-05-04 DIAGNOSIS — E66.01 CLASS 3 SEVERE OBESITY WITH SERIOUS COMORBIDITY AND BODY MASS INDEX (BMI) OF 45.0 TO 49.9 IN ADULT, UNSPECIFIED OBESITY TYPE (H): ICD-10-CM

## 2023-05-04 DIAGNOSIS — E66.813 CLASS 3 SEVERE OBESITY WITH SERIOUS COMORBIDITY AND BODY MASS INDEX (BMI) OF 45.0 TO 49.9 IN ADULT, UNSPECIFIED OBESITY TYPE (H): ICD-10-CM

## 2023-05-04 LAB
ALBUMIN SERPL BCG-MCNC: 4.5 G/DL (ref 3.5–5.2)
ALP SERPL-CCNC: 64 U/L (ref 35–104)
ALT SERPL W P-5'-P-CCNC: 34 U/L (ref 10–35)
ANION GAP SERPL CALCULATED.3IONS-SCNC: 14 MMOL/L (ref 7–15)
AST SERPL W P-5'-P-CCNC: 56 U/L (ref 10–35)
BILIRUB SERPL-MCNC: 0.4 MG/DL
BUN SERPL-MCNC: 7.9 MG/DL (ref 6–20)
CALCIUM SERPL-MCNC: 9.5 MG/DL (ref 8.6–10)
CHLORIDE SERPL-SCNC: 101 MMOL/L (ref 98–107)
CREAT SERPL-MCNC: 0.56 MG/DL (ref 0.51–0.95)
DEPRECATED HCO3 PLAS-SCNC: 23 MMOL/L (ref 22–29)
GFR SERPL CREATININE-BSD FRML MDRD: >90 ML/MIN/1.73M2
GLUCOSE SERPL-MCNC: 97 MG/DL (ref 70–99)
POTASSIUM SERPL-SCNC: 4.1 MMOL/L (ref 3.4–5.3)
PROT SERPL-MCNC: 8 G/DL (ref 6.4–8.3)
PTH-INTACT SERPL-MCNC: 38 PG/ML (ref 15–65)
SODIUM SERPL-SCNC: 138 MMOL/L (ref 136–145)

## 2023-05-04 PROCEDURE — 82306 VITAMIN D 25 HYDROXY: CPT

## 2023-05-04 PROCEDURE — 83970 ASSAY OF PARATHORMONE: CPT

## 2023-05-04 PROCEDURE — 36415 COLL VENOUS BLD VENIPUNCTURE: CPT

## 2023-05-04 PROCEDURE — 80053 COMPREHEN METABOLIC PANEL: CPT

## 2023-05-05 LAB — DEPRECATED CALCIDIOL+CALCIFEROL SERPL-MC: 35 UG/L (ref 20–75)

## 2023-05-17 ENCOUNTER — E-VISIT (OUTPATIENT)
Dept: FAMILY MEDICINE | Facility: CLINIC | Age: 28
End: 2023-05-17
Payer: COMMERCIAL

## 2023-05-17 DIAGNOSIS — Z72.51 UNPROTECTED SEXUAL INTERCOURSE: ICD-10-CM

## 2023-05-17 DIAGNOSIS — Z11.3 ROUTINE SCREENING FOR STI (SEXUALLY TRANSMITTED INFECTION): Primary | ICD-10-CM

## 2023-05-17 PROCEDURE — 99421 OL DIG E/M SVC 5-10 MIN: CPT | Performed by: FAMILY MEDICINE

## 2023-05-17 NOTE — PATIENT INSTRUCTIONS
Thank you for choosing us for your care.  I have placed the orders (gonorrhea/chlamydia both urine and throat, and HIV and syphilis blood tests).  Please schedule an appointment with the lab right here in [facility], or call [phone].  I will let you know when the results are back and next steps to take.

## 2023-05-17 NOTE — TELEPHONE ENCOUNTER
STI orders placed and msg sent to pt- will await results- CW    Provider E-Visit time total (minutes): 6

## 2023-05-23 ENCOUNTER — LAB (OUTPATIENT)
Dept: LAB | Facility: CLINIC | Age: 28
End: 2023-05-23
Payer: COMMERCIAL

## 2023-05-23 DIAGNOSIS — Z72.51 UNPROTECTED SEXUAL INTERCOURSE: ICD-10-CM

## 2023-05-23 DIAGNOSIS — Z11.3 ROUTINE SCREENING FOR STI (SEXUALLY TRANSMITTED INFECTION): ICD-10-CM

## 2023-05-23 PROCEDURE — 87491 CHLMYD TRACH DNA AMP PROBE: CPT

## 2023-05-23 PROCEDURE — 36415 COLL VENOUS BLD VENIPUNCTURE: CPT

## 2023-05-23 PROCEDURE — 86780 TREPONEMA PALLIDUM: CPT

## 2023-05-23 PROCEDURE — 87591 N.GONORRHOEAE DNA AMP PROB: CPT | Mod: 59

## 2023-05-23 PROCEDURE — 87389 HIV-1 AG W/HIV-1&-2 AB AG IA: CPT

## 2023-05-24 LAB
C TRACH DNA SPEC QL NAA+PROBE: NEGATIVE
C TRACH DNA SPEC QL NAA+PROBE: NEGATIVE
HIV 1+2 AB+HIV1 P24 AG SERPL QL IA: NONREACTIVE
N GONORRHOEA DNA SPEC QL NAA+PROBE: NEGATIVE
N GONORRHOEA DNA SPEC QL NAA+PROBE: NEGATIVE
T PALLIDUM AB SER QL: NONREACTIVE

## 2023-05-25 NOTE — RESULT ENCOUNTER NOTE
Your STD labs all came back negative (gonorrhea, chlamydia, HIV and syphilis).    Please let me know if you have any questions.  Best,   Osei Serrato MD

## 2023-05-31 NOTE — TELEPHONE ENCOUNTER
Lab result msg sent as below...     Your STD labs all came back negative (gonorrhea, chlamydia, HIV and syphilis).     Please let me know if you have any questions.  Best,   Osei Serrato MD   Written by Nidia Serrato MD on 5/25/2023  8:53 AM CDT  Seen by patient Lenka Adams on 5/25/2023 12:35 PM      Provider E-Visit time total (minutes): -

## 2023-06-06 NOTE — PROGRESS NOTES
"Lenka Adams is a 27 year old female who is being evaluated via a billable video visit.      The patient has been notified of following:     \"This video visit will be conducted via a call between you and your physician/provider. We have found that certain health care needs can be provided without the need for an in-person physical exam.  This service lets us provide the care you need with a video conversation.  If a prescription is necessary we can send it directly to your pharmacy.  If lab work is needed we can place an order for that and you can then stop by our lab to have the test done at a later time.    Video visits are billed at different rates depending on your insurance coverage.  Please reach out to your insurance provider with any questions.    If during the course of the call the physician/provider feels a video visit is not appropriate, you will not be charged for this service.\"    Patient has given verbal consent for Video visit? Yes  How would you like to obtain your AVS? MyChart  If you are dropped from the video visit, the video invite should be resent to: Text to cell phone: [phone]  Will anyone else be joining your video visit? No  {If patient encounters technical issues they should call [phone]      Video-Visit Details    Type of service:  Video Visit    Video Start Time: 1:54 pm   Video End Time: 2:07 pm     Originating Location (pt. Location): Home    Distant Location (provider location):  Offsite (providers home)     Platform used for Video Visit: [employer]    During this virtual visit the patient is located in [place], patient verifies this as the location during the entirety of this visit.     Weight Management Nutrition Consultation    Lenka Adams is a 27 year old female presents today for weight management nutrition consultation.  Patient referred by Sofia Villatoro PA-C on February 21, 2023.    Patient with Co-morbidities of obesity including:  Type II DM " "no  Renal Failure no  Sleep apnea no  Hypertension no   Dyslipidemia no  Joint pain no  Back pain yes  GERD no     PMH: fatigue, irregular menses    Anthropometrics:  Highest weight in life: 320 lbs  Weight 2/21/23: 306 lbs with BMI 47.93    Estimated body mass index is 44.79 kg/m  as calculated from the following:    Height as of this encounter: 1.702 m (5' 7\").    Weight as of this encounter: 129.7 kg (286 lb).     Current: 284-289 lbs, pt report    Medications for Weight Loss:  Phentermine  Naltrexone added 4/6/23 per chart review    Supplements:  Vit D 5,000 international unit(s)/day - added after labs 2/22/23. Decreased to 1,000 international unit(s)/day   Multivitamin with 1,000 international unit(s) of Vitamin D    Labs 2/22/23  Vit D 14  PTH WNL    Labs 5/4/23  PTH WNL  Vit D improved to 35, decreased vitamin D from 5,000 to 2,000 international unit(s)/day.     NUTRITION HISTORY  NKFA  Some possible lactose or dairy intolerance  per pt- really likes yogurt and cheese. Does not like regular milk, prefers almond.   Cultural preferences: , lots of rice/beans/tortillas growing   Loves fish/seafood     Has never worked with a RD. No nutrition education in school but has looked up stuff online.     Pt goals: be/feel healthier, prevent future health issues    Recent 15 lb weight loss - portion control. Allowing self foods such as cake still but smaller amounts.  Trying to focus on nutrients over just eating - snacking on vegetables/fruit over chips/crackers. Realized she wasn t feeling well mentally with the way she was eating and wanted to make changes. Feels good to challenge self and achieve/make changes.    Growing up ate whatever per pt - didn t know what was nutritious vs not or what her body needed.    Per PA note    Eating 3 meals a day, with snacks. Tries to focus on proteins and lean meats. Monitors calories. Increased hunger. No thoughts of food. Struggles to get full and stay full. Previously " would eat till over full, but since watching portion sizes she is hungry sooner and not able to get satisfied. No cravings. Drinks water, crystal light, sparkling water.      Activity - struggles with energy and motivation. Tries to work out 2x week around 20min. At home gym.       Typical foods per questionnaire  B - eggs & sausage, turkey sandwich, oatmeal  L - Salad, chicken or beef with rice & beans, soup  D - same as lunch  Snacks - popcorn, nuts, fruit, chips, yogurt, cheese/crackers, meat/crackers     April 2023:    Pt late for appt today. Time cut short.    Pt curious about portion sizes. Discussed. Has been tracking kcal - answered questions. At first noticed she was losing, now stabilizing. Trying to keep around 2000 kcal    Working on more vegetables and protein and less carbohydrates per pt.   Vegetables - spinach, carrots, cauliflower, salads, broccoli  Protein - protein shake, yogurt, chicken, fish, turkey (trying to have this instead of ground beef or oreilly)  Carbs - oatmeal, rice, sandwich with wheat bread, beans, bagels occ    PA: walking when nice out    June 2023:  No questions/concerns today per pt. Feels things are going well.    Not tracking kcal currently. Focusing on plate method - trying to focus on more vegetables.    Protein: fish, tuna, shrimp, chicken, beans, tofu  Carbohydrates: trying to have brown rice over white, bread occ, oatmeal, starchy vegetables (potato, corn), fruit    Trying to snack on fruit over chips. Lots of apples/oranges/grapefruit/strawberries/raspberries/blueberries/blackberries. Some bananas     Hydration: water, water with zhanna, ICE drinks, flavored sparkling water   - No concern with hydration per pt. 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    Nutrition Diagnosis  Food and nutrition related knowledge deficit r/t lack of prior exposure to nutrition education aeb pt report and interest in learning     Obesity r/t long history of positive energy balance aeb BMI >30.    Nutrition Intervention  Assessed learning type: [place]  Reviewed previous information  Answered pt questions  Coordination of care   Nutrition education -   Discussed macronutrient and micronutrients.   Provided examples of protein, carbohydrates and fat. Discussed types of each that are recommended more often than others for overall health.     AVS and handouts via [employer]    Patient demonstrates understanding.    Expected Engagement: good     Nutrition Goals/Resources:  1. Continue with Plate Method as able  2. Continue working on finding more nutritious alternatives  example zhanna instead of juice)  3. Aim to go to the gym 2x/week     Keep up the hard work!    Macronutrients:  Protein - want leaner protein more often than fattier protein (red meat such as beef and pork being fattier)    Carbohydrates     Fat    Micronutrients:   Vitamins   Minerals   - We obtain these from our macronutrient    Protein examples:   ? Chicken  ? Turkey  ? Fish  ? Seafood (shrimp, lobster, crab, etc)  ? Lean cuts of beef (93% ground, sirloin, tenderloin, etc)  ? Pork (limit fatter options like oreilly)  ? Cottage cheese  ? Greek yogurt  ? Eggs   ? Low-fat Cheese  ? Lentils/beans  ? Nuts/nut butter (recommend a smaller portion if doing these options - 2 Tbsp of nuts or 1/4 cup nut butter)  ? Tofu  ? Seitan     Unsaturated fat examples:    - Nuts   - Seeds   - Avocado   - Oils    - Omega 3s (from fish for example)    *Want to aim for unsaturated fats more often than saturated     Types  of fats to help further clarify:   https://[email or web address]/what-should-you-eat/fats-and-cholesterol/types-of-fat/     Carbohydrate examples:    - Brown/Wild rice   - Egg life wrap (low carb and great source of protein!)    - Whole wheat pasta   - Protein pasta (barilla protein +, lentil pasta, etc)   - Whole wheat or carb balance tortilla   - Oatmeal    - Fruit   - Starchy vegetables (corn, peas, beans/lentils, potatoes, winter squash)   - Whole grain crackers    - Whole wheat waffle    - [place] protein waffles/pancakes    - Couscous     Non-starchy Vegetables Examples:   - Lettuce   - Spinach   - Onions    - Bell pepper   - Carrots   - Broccoli   - Cabbage   - Brussel sprouts   - Cauliflower    - Asparagus    - Cucumber   - Artichoke   - Zucchini   - Bean sprouts   - Mushrooms   - Sugar snap peas   - Eggplant   - Turnips   - Celery   - Radishes    - Green beans    Starchy vegetable examples:  ? Corn  ? Green peas  ? Winter squash (butternut, spaghetti squash, acorn)  ? Beans/lentils (not including green beans)  ? Potatoes  ? Sweet potatoes     Plate method can be used for general guidance on balanced meals/portion sizes (see link below for picture/more information)                 Make 1/2 (1 cup+) your plate non starchy vegetable(cauliflower, broccoli, asparagus, [place] sprouts, lettuce, carrots, for example).                3+ oz lean protein sources (salmon/skinless chicken/turkey breast/pork loin/lean cuts of beef/ or non-animal protein such as black, kidney or freeman beans, tofu/edamame/tempeh)     (Note: 3 oz = deck of cards size)                 1/2 to 1 cup carbohydrate choices such as whole grain starches or starchy vegetables or fruit. For example: quinoa, brown rice, barley, potatoes, sweet potatoes, winter squash, peas, corn, or fruit.               Choose ~0-2 added fat servings at a meal (avocados, nut butters, nuts or seeds, olive oil, vegetable oils).    The Plate  Method:  https://www.cdc.gov/diabetes/images/managing/Diabetes-Manage-Eat-Well-Plate-Graphic_600px.jpg    Follow-Up:  Tuesday, August 8th at 9:30 am    Time spent with patient: 13 minutes.  ABDIEL Doty, RD, LD

## 2023-06-07 ENCOUNTER — VIRTUAL VISIT (OUTPATIENT)
Dept: ENDOCRINOLOGY | Facility: CLINIC | Age: 28
End: 2023-06-07
Payer: COMMERCIAL

## 2023-06-07 VITALS — WEIGHT: 286 LBS | HEIGHT: 67 IN | BODY MASS INDEX: 44.89 KG/M2

## 2023-06-07 DIAGNOSIS — E66.01 CLASS 3 SEVERE OBESITY WITH SERIOUS COMORBIDITY AND BODY MASS INDEX (BMI) OF 45.0 TO 49.9 IN ADULT, UNSPECIFIED OBESITY TYPE (H): ICD-10-CM

## 2023-06-07 DIAGNOSIS — E66.813 CLASS 3 SEVERE OBESITY WITH SERIOUS COMORBIDITY AND BODY MASS INDEX (BMI) OF 45.0 TO 49.9 IN ADULT, UNSPECIFIED OBESITY TYPE (H): ICD-10-CM

## 2023-06-07 DIAGNOSIS — Z71.3 NUTRITIONAL COUNSELING: Primary | ICD-10-CM

## 2023-06-07 PROCEDURE — 97803 MED NUTRITION INDIV SUBSEQ: CPT | Mod: VID | Performed by: DIETITIAN, REGISTERED

## 2023-06-07 PROCEDURE — 99207 PR NO CHARGE LOS: CPT | Mod: VID | Performed by: DIETITIAN, REGISTERED

## 2023-06-07 ASSESSMENT — PAIN SCALES - GENERAL: PAINLEVEL: NO PAIN (0)

## 2023-06-07 NOTE — PATIENT INSTRUCTIONS
Nutrition Goals/Resources:  1. Continue with Plate Method as able  2. Continue working on finding more nutritious alternatives  example zhanna instead of juice)  3. Aim to go to the gym 2x/week     Keep up the hard work!    Macronutrients:  Protein - want leaner protein more often than fattier protein (red meat such as beef and pork being fattier)    Carbohydrates     Fat    Micronutrients:   Vitamins   Minerals   - We obtain these from our macronutrient    Protein examples:   Chicken  Turkey  Fish  Seafood (shrimp, lobster, crab, etc)  Lean cuts of beef (93% ground, sirloin, tenderloin, etc)  Pork (limit fatter options like oreilly)  Cottage cheese  Greek yogurt  Eggs   Low-fat Cheese  Lentils/beans  Nuts/nut butter (recommend a smaller portion if doing these options - 2 Tbsp of nuts or 1/4 cup nut butter)  Tofu  Seitan     Unsaturated fat examples:    - Nuts   - Seeds   - Avocado   - Oils    - Omega 3s (from fish for example)    *Want to aim for unsaturated fats more often than saturated     Types of fats to help further clarify:   https://[email or web address]/nutritionsource/what-should-you-eat/fats-and-cholesterol/types-of-fat/     Carbohydrate examples:    - Brown/Wild rice   - Egg life wrap (low carb and great source of protein!)    - Whole wheat pasta   - Protein pasta (barilla protein +, lentil pasta, etc)   - Whole wheat or carb balance tortilla   - Oatmeal    - Fruit   - Starchy vegetables (corn, peas, beans/lentils, potatoes, winter squash)   - Whole grain crackers    - Whole wheat waffle    - [place] protein waffles/pancakes    - Couscous     Non-starchy Vegetables Examples:   - Lettuce   - Spinach   - Onions    - Bell pepper   - Carrots   - Broccoli   - Cabbage   - Brussel sprouts   - Cauliflower    - Asparagus    - Cucumber   - Artichoke   - Zucchini   - Bean sprouts   - Mushrooms   - Sugar snap peas   - Eggplant   - Turnips   - Celery   - Radishes    - Green beans    Starchy vegetable  examples:  Corn  Green peas  Winter squash (butternut, spaghetti squash, acorn)  Beans/lentils (not including green beans)  Potatoes  Sweet potatoes     Plate method can be used for general guidance on balanced meals/portion sizes (see link below for picture/more information)                 Make 1/2 (1 cup+) your plate non starchy vegetable(cauliflower, broccoli, asparagus, [place] sprouts, lettuce, carrots, for example).                3+ oz lean protein sources (salmon/skinless chicken/turkey breast/pork loin/lean cuts of beef/ or non-animal protein such as black, kidney or freeman beans, tofu/edamame/tempeh)     (Note: 3 oz = deck of cards size)                 1/2 to 1 cup carbohydrate choices such as whole grain starches or starchy vegetables or fruit. For example: quinoa, brown rice, barley, potatoes, sweet potatoes, winter squash, peas, corn, or fruit.               Choose ~0-2 added fat servings at a meal (avocados, nut butters, nuts or seeds, olive oil, vegetable oils).    The Plate Method:  https://www.cdc.gov/diabetes/images/managing/Diabetes-Manage-Eat-Well-Plate-Graphic_600px.jpg    Follow-Up:  Tuesday, August 8th at 9:30 am    ABDIEL Gay, RD, LD  Clinic #: [phone]

## 2023-06-07 NOTE — NURSING NOTE
Is the patient currently in the state of [place]? YES    Visit mode:VIDEO    If the visit is dropped, the patient can be reconnected by: VIDEO VISIT: Text to cell phone: [phone]    Will anyone else be joining the visit? NO      How would you like to obtain your AVS? MyChart    Are changes needed to the allergy or medication list? NO    Reason for visit: RECHTREASURE Alvarez on 6/7/2023 at 1:35 PM

## 2023-06-07 NOTE — LETTER
"6/7/2023       RE: Lenka Adams  [address]  [facility] [address]     Dear Colleague,    Thank you for referring your patient, Lenka Adams, to the [facility] [place] at [facility]. Please see a copy of my visit note below.    Lenka Adams is a 27 year old female who is being evaluated via a billable video visit.      The patient has been notified of following:     \"This video visit will be conducted via a call between you and your physician/provider. We have found that certain health care needs can be provided without the need for an in-person physical exam.  This service lets us provide the care you need with a video conversation.  If a prescription is necessary we can send it directly to your pharmacy.  If lab work is needed we can place an order for that and you can then stop by our lab to have the test done at a later time.    Video visits are billed at different rates depending on your insurance coverage.  Please reach out to your insurance provider with any questions.    If during the course of the call the physician/provider feels a video visit is not appropriate, you will not be charged for this service.\"    Patient has given verbal consent for Video visit? Yes  How would you like to obtain your AVS? MyChart  If you are dropped from the video visit, the video invite should be resent to: Text to cell phone: [phone]  Will anyone else be joining your video visit? No  {If patient encounters technical issues they should call [phone]      Video-Visit Details    Type of service:  Video Visit    Video Start Time: 1:54 pm   Video End Time: 2:07 pm     Originating Location (pt. Location): Home    Distant Location (provider location):  Offsite (providers home)     Platform used for Video Visit: [employer]    During this virtual visit the patient is located in [place], patient verifies this as " "the location during the entirety of this visit.     Weight Management Nutrition Consultation    Lenka Adams is a 27 year old female presents today for weight management nutrition consultation.  Patient referred by Sofia Villatoro PA-C on February 21, 2023.    Patient with Co-morbidities of obesity including:  Type II DM no  Renal Failure no  Sleep apnea no  Hypertension no   Dyslipidemia no  Joint pain no  Back pain yes  GERD no     PMH: fatigue, irregular menses    Anthropometrics:  Highest weight in life: 320 lbs  Weight 2/21/23: 306 lbs with BMI 47.93    Estimated body mass index is 44.79 kg/m  as calculated from the following:    Height as of this encounter: 1.702 m (5' 7\").    Weight as of this encounter: 129.7 kg (286 lb).     Current: 284-289 lbs, pt report    Medications for Weight Loss:  Phentermine  Naltrexone added 4/6/23 per chart review    Supplements:  Vit D 5,000 international unit(s)/day - added after labs 2/22/23. Decreased to 1,000 international unit(s)/day   Multivitamin with 1,000 international unit(s) of Vitamin D    Labs 2/22/23  Vit D 14  PTH WNL    Labs 5/4/23  PTH WNL  Vit D improved to 35, decreased vitamin D from 5,000 to 2,000 international unit(s)/day.     NUTRITION HISTORY  NKFA  Some possible lactose or dairy intolerance  per pt- really likes yogurt and cheese. Does not like regular milk, prefers almond.   Cultural preferences: , lots of rice/beans/tortillas growing   Loves fish/seafood     Has never worked with a RD. No nutrition education in school but has looked up stuff online.     Pt goals: be/feel healthier, prevent future health issues    Recent 15 lb weight loss - portion control. Allowing self foods such as cake still but smaller amounts.  Trying to focus on nutrients over just eating - snacking on vegetables/fruit over chips/crackers. Realized she wasn t feeling well mentally with the way she was eating and wanted to make changes. Feels good to challenge " self and achieve/make changes.    Growing up ate whatever per pt - didn t know what was nutritious vs not or what her body needed.    Per PA note    Eating 3 meals a day, with snacks. Tries to focus on proteins and lean meats. Monitors calories. Increased hunger. No thoughts of food. Struggles to get full and stay full. Previously would eat till over full, but since watching portion sizes she is hungry sooner and not able to get satisfied. No cravings. Drinks water, crystal light, sparkling water.      Activity - struggles with energy and motivation. Tries to work out 2x week around 20min. At home gym.       Typical foods per questionnaire  B - eggs & sausage, turkey sandwich, oatmeal  L - Salad, chicken or beef with rice & beans, soup  D - same as lunch  Snacks - popcorn, nuts, fruit, chips, yogurt, cheese/crackers, meat/crackers     April 2023:    Pt late for appt today. Time cut short.    Pt curious about portion sizes. Discussed. Has been tracking kcal - answered questions. At first noticed she was losing, now stabilizing. Trying to keep around 2000 kcal    Working on more vegetables and protein and less carbohydrates per pt.   Vegetables - spinach, carrots, cauliflower, salads, broccoli  Protein - protein shake, yogurt, chicken, fish, turkey (trying to have this instead of ground beef or oreilly)  Carbs - oatmeal, rice, sandwich with wheat bread, beans, bagels occ    PA: walking when nice out    June 2023:  No questions/concerns today per pt. Feels things are going well.    Not tracking kcal currently. Focusing on plate method - trying to focus on more vegetables.    Protein: fish, tuna, shrimp, chicken, beans, tofu  Carbohydrates: trying to have brown rice over white, bread occ, oatmeal, starchy vegetables (potato, corn), fruit    Trying to snack on fruit over chips. Lots of apples/oranges/grapefruit/strawberries/raspberries/blueberries/blackberries. Some bananas     Hydration: water, water with zhanna, ICE drinks,  flavored sparkling water   - No concern with hydration per pt. 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    Nutrition Diagnosis  Food and nutrition related knowledge deficit r/t lack of prior exposure to nutrition education aeb pt report and interest in learning     Obesity r/t long history of positive energy balance aeb BMI >30.    Nutrition Intervention  Assessed learning type: [place]  Reviewed previous information  Answered pt questions  Coordination of care   Nutrition education -   Discussed macronutrient and micronutrients.   Provided examples of protein, carbohydrates and fat. Discussed types of each that are recommended more often than others for overall health.     AVS and handouts via [employer]    Patient demonstrates understanding.    Expected Engagement: good     Nutrition Goals/Resources:  1. Continue with Plate Method as able  2. Continue working on finding more nutritious alternatives  example zhanna instead of juice)  3. Aim to go to the gym 2x/week     Keep up the hard work!    Macronutrients:  Protein - want leaner protein more often than fattier protein (red meat such as beef and pork being fattier)    Carbohydrates     Fat    Micronutrients:   Vitamins   Minerals   - We obtain these from our macronutrient    Protein examples:   Chicken  Turkey  Fish  Seafood (shrimp, lobster, crab, etc)  Lean cuts of beef (93% ground, sirloin, tenderloin, etc)  Pork (limit fatter options like oreilly)  Cottage cheese  Greek yogurt  Eggs   Low-fat  Cheese  Lentils/beans  Nuts/nut butter (recommend a smaller portion if doing these options - 2 Tbsp of nuts or 1/4 cup nut butter)  Tofu  Seitan     Unsaturated fat examples:    - Nuts   - Seeds   - Avocado   - Oils    - Omega 3s (from fish for example)    *Want to aim for unsaturated fats more often than saturated     Types of fats to help further clarify:   https://[email or web address]/nutritionsource/what-should-you-eat/fats-and-cholesterol/types-of-fat/     Carbohydrate examples:    - Brown/Wild rice   - Egg life wrap (low carb and great source of protein!)    - Whole wheat pasta   - Protein pasta (barilla protein +, lentil pasta, etc)   - Whole wheat or carb balance tortilla   - Oatmeal    - Fruit   - Starchy vegetables (corn, peas, beans/lentils, potatoes, winter squash)   - Whole grain crackers    - Whole wheat waffle    - [place] protein waffles/pancakes    - Couscous     Non-starchy Vegetables Examples:   - Lettuce   - Spinach   - Onions    - Bell pepper   - Carrots   - Broccoli   - Cabbage   - Brussel sprouts   - Cauliflower    - Asparagus    - Cucumber   - Artichoke   - Zucchini   - Bean sprouts   - Mushrooms   - Sugar snap peas   - Eggplant   - Turnips   - Celery   - Radishes    - Green beans    Starchy vegetable examples:  Corn  Green peas  Winter squash (butternut, spaghetti squash, acorn)  Beans/lentils (not including green beans)  Potatoes  Sweet potatoes     Plate method can be used for general guidance on balanced meals/portion sizes (see link below for picture/more information)                 Make 1/2 (1 cup+) your plate non starchy vegetable(cauliflower, broccoli, asparagus, [place] sprouts, lettuce, carrots, for example).                3+ oz lean protein sources (salmon/skinless chicken/turkey breast/pork loin/lean cuts of beef/ or non-animal protein such as black, kidney or freeman beans, tofu/edamame/tempeh)     (Note: 3 oz = deck of cards size)                 1/2 to 1 cup carbohydrate  choices such as whole grain starches or starchy vegetables or fruit. For example: quinoa, brown rice, barley, potatoes, sweet potatoes, winter squash, peas, corn, or fruit.               Choose ~0-2 added fat servings at a meal (avocados, nut butters, nuts or seeds, olive oil, vegetable oils).    The Plate Method:  https://www.cdc.gov/diabetes/images/managing/Diabetes-Manage-Eat-Well-Plate-Graphic_600px.jpg    Follow-Up:  Tuesday, August 8th at 9:30 am    Time spent with patient: 13 minutes.  ABDIEL Doty, RD, LD

## 2023-06-20 ENCOUNTER — OFFICE VISIT (OUTPATIENT)
Dept: URGENT CARE | Facility: URGENT CARE | Age: 28
End: 2023-06-20
Payer: COMMERCIAL

## 2023-06-20 VITALS
DIASTOLIC BLOOD PRESSURE: 85 MMHG | RESPIRATION RATE: 16 BRPM | HEART RATE: 76 BPM | TEMPERATURE: 97.8 F | SYSTOLIC BLOOD PRESSURE: 130 MMHG | OXYGEN SATURATION: 98 % | BODY MASS INDEX: 43.45 KG/M2 | WEIGHT: 277.4 LBS

## 2023-06-20 DIAGNOSIS — J02.0 STREP THROAT: Primary | ICD-10-CM

## 2023-06-20 DIAGNOSIS — R07.0 THROAT PAIN: ICD-10-CM

## 2023-06-20 LAB — DEPRECATED S PYO AG THROAT QL EIA: POSITIVE

## 2023-06-20 PROCEDURE — 99213 OFFICE O/P EST LOW 20 MIN: CPT | Performed by: PHYSICIAN ASSISTANT

## 2023-06-20 PROCEDURE — 87880 STREP A ASSAY W/OPTIC: CPT | Performed by: PHYSICIAN ASSISTANT

## 2023-06-20 RX ORDER — AMOXICILLIN 500 MG/1
500 CAPSULE ORAL 2 TIMES DAILY
Qty: 20 CAPSULE | Refills: 0 | Status: SHIPPED | OUTPATIENT
Start: 2023-06-20 | End: 2023-06-30

## 2023-06-20 ASSESSMENT — PAIN SCALES - GENERAL: PAINLEVEL: EXTREME PAIN (9)

## 2023-06-20 NOTE — PROGRESS NOTES
Chief Complaint   Patient presents with     Pharyngitis     Sore throat beginning yesterday. Denies all other symptoms.                  ASSESSMENT:     ICD-10-CM    1. Strep throat  J02.0 amoxicillin (AMOXIL) 500 MG capsule      2. Throat pain  R07.0 Streptococcus A Rapid Screen w/Reflex to PCR - Clinic Collect     amoxicillin (AMOXIL) 500 MG capsule            PLAN: Strep throat.  Amoxicillin.  Considered contagious x12 hours.  Salt water gargles as needed.  OTC NSAIDs as needed.  I have discussed clinical findings with patient.  Side effects of medications discussed.  Symptomatic care is discussed.  I have discussed the possibility of  worsening symptoms and indication to RTC or go to the ER if they occur.  All questions are answered, patient indicates understanding of these issues and is in agreement with plan.   Patient care instructions are discussed/given at the end of visit.   Lots of rest and fluids.      Elke Brown PA-C      SUBJECTIVE:  27-year-old with sore throat since yesterday.  No other signs or symptoms.  No known strep exposure.      Allergies   Allergen Reactions     Clindamycin Hives and Rash       No past medical history on file.    phentermine (ADIPEX-P) 30 MG capsule, Take 1 capsule (30 mg) by mouth every morning  naltrexone (DEPADE/REVIA) 50 MG tablet, Take 1/2 tablet once daily 1-2 hours prior to worst cravings for 1 week, then increase to 1 tablet daily as directed if tolerating    No current facility-administered medications on file prior to visit.      Social History     Tobacco Use     Smoking status: Never     Passive exposure: Never     Smokeless tobacco: Never   Vaping Use     Vaping status: Never Used   Substance Use Topics     Alcohol use: Not on file       ROS:  CONSTITUTIONAL: Negative for fatigue or fever.  EYES: Negative for eye problems.  ENT: As above.  RESP: Neg for cough.  CV: Negative for chest pains.  GI: Negative for vomiting.  MUSCULOSKELETAL:  Negative for  significant muscle or joint pains.  NEUROLOGIC: Negative for headaches.  SKIN: Negative for rash.  PSYCH: Normal mentation for age.    OBJECTIVE:  /85 (BP Location: Left arm, Patient Position: Sitting, Cuff Size: Adult Large)   Pulse 76   Temp 97.8  F (36.6  C) (Tympanic)   Resp 16   Wt 125.8 kg (277 lb 6.4 oz)   SpO2 98%   BMI 43.45 kg/m    GENERAL APPEARANCE: Healthy, alert and no distress.  EYES:Conjunctiva/sclera clear.  EARS: No cerumen.   Ear canals w/o erythema.  TM's intact w/o erythema.    NOSE/MOUTH: Nose without ulcers, erythema or lesions.  SINUSES: No maxillary sinus tenderness.  THROAT: No erythema w/o tonsillar enlargement . No exudates.  NECK: Supple, nontender, no lymphadenopathy.  RESP: Lungs clear to auscultation - no rales, rhonchi or wheezes  CV: Regular rate and rhythm, normal S1 S2, no murmur noted.  NEURO: Awake, alert    SKIN: No rashes    Results for orders placed or performed in visit on 06/20/23   Streptococcus A Rapid Screen w/Reflex to PCR - Clinic Collect     Status: Abnormal    Specimen: Throat; Swab   Result Value Ref Range    Group A Strep antigen Positive (A) Negative       Elke Brown PA-C

## 2023-06-27 ENCOUNTER — VIRTUAL VISIT (OUTPATIENT)
Dept: ENDOCRINOLOGY | Facility: CLINIC | Age: 28
End: 2023-06-27
Payer: COMMERCIAL

## 2023-06-27 VITALS — WEIGHT: 277 LBS | HEIGHT: 67 IN | BODY MASS INDEX: 43.47 KG/M2

## 2023-06-27 DIAGNOSIS — E66.813 CLASS 3 SEVERE OBESITY WITH SERIOUS COMORBIDITY AND BODY MASS INDEX (BMI) OF 45.0 TO 49.9 IN ADULT, UNSPECIFIED OBESITY TYPE (H): ICD-10-CM

## 2023-06-27 DIAGNOSIS — E66.01 CLASS 3 SEVERE OBESITY WITH SERIOUS COMORBIDITY AND BODY MASS INDEX (BMI) OF 45.0 TO 49.9 IN ADULT, UNSPECIFIED OBESITY TYPE (H): ICD-10-CM

## 2023-06-27 PROCEDURE — 99213 OFFICE O/P EST LOW 20 MIN: CPT | Mod: VID

## 2023-06-27 RX ORDER — PHENTERMINE HYDROCHLORIDE 30 MG/1
30 CAPSULE ORAL EVERY MORNING
Qty: 30 CAPSULE | Refills: 3 | Status: SHIPPED | OUTPATIENT
Start: 2023-06-27 | End: 2024-01-02

## 2023-06-27 NOTE — NURSING NOTE
Is the patient currently in the state of [place]? YES    Visit mode:VIDEO    If the visit is dropped, the patient can be reconnected by: VIDEO VISIT: Text to cell phone: [phone]    Will anyone else be joining the visit? NO      How would you like to obtain your AVS? MyChart    Are changes needed to the allergy or medication list? NO    Reason for visit: Recheck

## 2023-06-27 NOTE — PROGRESS NOTES
Virtual Visit Details    Type of service:  Video Visit   Video Start Time: 11:31AM  Video End Time:11:46AM    Originating Location (pt. Location): Home    Distant Location (provider location):  Off-site  Platform used for Video Visit: [facility] Medical Weight Management Note     Lenka Adams  MRN:  [id number]  :  1995  JOHN:  2023    Dear Physician No Ref-Primary,    I had the pleasure of seeing your patient Lenka Adams. She is a 27 year old female who I am continuing to see for treatment of obesity related to:        2/15/2023     3:39 PM   --   I have the following health issues associated with obesity None of the above   I have the following symptoms associated with obesity Back Pain    Fatigue       Assessment & Plan   Problem List Items Addressed This Visit        Digestive    Class 3 severe obesity with serious comorbidity and body mass index (BMI) of 45.0 to 49.9 in adult (H)     Last seen 2023. Increased Phentermine 30mg and start Naltrexone 50mg. Has lost 30lbs and 10% of TBW.     Phentermine 30mg has been very helpful with day time hunger. No side effects. BP WNL.     Naltrexone has no effect on hunger or cravings in the evenings. Still having increase snacking. No side effects. Will stop today.     Discussed topiramate to help with evening hunger. However, is not taking birth control and is sexually active. With risk of birth defects, would want her to be on birth control prior to starting. Can consider in the future if so.     Discussed starting a GLP-1. Can consider starting in the future if weight loss pleatus.            Relevant Medications    phentermine (ADIPEX-P) 30 MG capsule      1. Stop Naltrexone - no effect on weight.   2. Continue Phentermine 30mg once daily. Refills sent   3. Follow up with Sofia Crowder in 2 months     INTERVAL HISTORY:  New MWM - 2023  Started Phentermine   Last seen 2023   - Increased Phentermine 30mg once daily   -  Started Naltrexone       Anti-obesity medications:     Current:   Phentermine 30mg - no side effects. /85 at PCP 6/20/2023. Has been helping with day time hunger.     Naltrexone 50mg - taking 1 tablet at night. No side effects. No effect on hunger, still feels like she wants to snack.     Failed/contraindicated:   Topiramate - can consider in future. Not on birth control  GLP-1 - concern for injections.    Recent diet changes: Eating 2 meals a day. Increase hunger still in evening, that leads to increase snacking. Hunger has been better over all with phentermine. Trying to pick healthier options.     Recent exercise/activity changes: Going to the gym 2xweek for 1 hour. Increase walking with weather change.     Recent stressors: No concerns    Recent sleep changes: No concerns     Vitamins/Labs: Taking daily MV with vitamin D 1000IU and one vitamin D 1000IU     CURRENT WEIGHT:   277 lbs 0 oz    Initial Weight (lbs): 306 lbs     Cumulative weight loss (lbs): 29  Weight Loss Percentage: 9.48%     Wt Readings from Last 5 Encounters:   06/27/23 125.6 kg (277 lb)   06/20/23 125.8 kg (277 lb 6.4 oz)   06/07/23 129.7 kg (286 lb)   04/06/23 136.1 kg (300 lb)   02/24/23 136.5 kg (301 lb)             6/26/2023    12:41 PM   Changes and Difficulties   With regards to my diet, I am still struggling with: feeling full/hungry, binge eating   I have made the following changes to my activity/exercise since my last visit: gym 1-2x a week 45-60 mins         MEDICATIONS:   Current Outpatient Medications   Medication Sig Dispense Refill     amoxicillin (AMOXIL) 500 MG capsule Take 1 capsule (500 mg) by mouth 2 times daily for 10 days 20 capsule 0     naltrexone (DEPADE/REVIA) 50 MG tablet Take 1/2 tablet once daily 1-2 hours prior to worst cravings for 1 week, then increase to 1 tablet daily as directed if tolerating 30 tablet 3     phentermine (ADIPEX-P) 30 MG capsule Take 1 capsule (30 mg) by mouth every morning 30 capsule 3  "          6/26/2023    12:41 PM   Weight Loss Medication History Reviewed With Patient   Which weight loss medications are you currently taking on a regular basis? Naltrexone    Phentermine   Are you having any side effects from the weight loss medication that we have prescribed you? No             Objective    Ht 1.702 m (5' 7\")   Wt 125.6 kg (277 lb)   BMI 43.38 kg/m      Vitals - Patient Reported  Weight (Patient Reported): 125.6 kg (277 lb)  Height (Patient Reported): 170.2 cm (5' 7\")  BMI (Based on Pt Reported Ht/Wt): 43.38  Pain Score: No Pain (0)      Vitals:  No vitals were obtained today due to virtual visit.    PHYSICAL EXAM:  GENERAL: Healthy, alert and no distress  EYES: Eyes grossly normal to inspection.  No discharge or erythema, or obvious scleral/conjunctival abnormalities.  RESP: No audible wheeze, cough, or visible cyanosis.  No visible retractions or increased work of breathing.    SKIN: Visible skin clear. No significant rash, abnormal pigmentation or lesions.  NEURO: Cranial nerves grossly intact.  Mentation and speech appropriate for age.  PSYCH: Mentation appears normal, affect normal/bright, judgement and insight intact, normal speech and appearance well-groomed.        Sincerely,    LAWRENCE WISE PA-C      26 minutes spent by me on the date of the encounter doing chart review, history and exam, documentation and further activities per the note  "

## 2023-06-27 NOTE — LETTER
2023       RE: Lenka Adams  [address]  [place] [place] [facility] [address]     Dear Colleague,    Thank you for referring your patient, Lenka Adams, to the [facility] WEIGHT MANAGEMENT CLINIC [place] at [facility]. Please see a copy of my visit note below.    Virtual Visit Details    Type of service:  Video Visit   Video Start Time: 11:31AM  Video End Time:11:46AM    Originating Location (pt. Location): Home    Distant Location (provider location):  Off-site  Platform used for Video Visit: [facility] Medical Weight Management Note     Lenka Adams  MRN:  [id number]  :  1995  JOHN:  2023    Dear Physician No Ref-Primary,    I had the pleasure of seeing your patient Lenka Adams. She is a 27 year old female who I am continuing to see for treatment of obesity related to:        2/15/2023     3:39 PM   --   I have the following health issues associated with obesity None of the above   I have the following symptoms associated with obesity Back Pain    Fatigue       Assessment & Plan   Problem List Items Addressed This Visit          Digestive    Class 3 severe obesity with serious comorbidity and body mass index (BMI) of 45.0 to 49.9 in adult (H)     Last seen 2023. Increased Phentermine 30mg and start Naltrexone 50mg. Has lost 30lbs and 10% of TBW.     Phentermine 30mg has been very helpful with day time hunger. No side effects. BP WNL.     Naltrexone has no effect on hunger or cravings in the evenings. Still having increase snacking. No side effects. Will stop today.     Discussed topiramate to help with evening hunger. However, is not taking birth control and is sexually active. With risk of birth defects, would want her to be on birth control prior to starting. Can consider in the future if so.     Discussed starting a GLP-1. Can consider starting in the future if weight loss pleatus.             Relevant Medications    phentermine (ADIPEX-P) 30 MG capsule      Stop Naltrexone - no effect on weight.   Continue Phentermine 30mg once daily. Refills sent   Follow up with Sofia Crowder in 2 months     INTERVAL HISTORY:  New MWM - 2/21/2023  Started Phentermine   Last seen 4/2/2023   - Increased Phentermine 30mg once daily   - Started Naltrexone       Anti-obesity medications:     Current:   Phentermine 30mg - no side effects. /85 at PCP 6/20/2023. Has been helping with day time hunger.     Naltrexone 50mg - taking 1 tablet at night. No side effects. No effect on hunger, still feels like she wants to snack.     Failed/contraindicated:   Topiramate - can consider in future. Not on birth control  GLP-1 - concern for injections.    Recent diet changes: Eating 2 meals a day. Increase hunger still in evening, that leads to increase snacking. Hunger has been better over all with phentermine. Trying to pick healthier options.     Recent exercise/activity changes: Going to the gym 2xweek for 1 hour. Increase walking with weather change.     Recent stressors: No concerns    Recent sleep changes: No concerns     Vitamins/Labs: Taking daily MV with vitamin D 1000IU and one vitamin D 1000IU     CURRENT WEIGHT:   277 lbs 0 oz    Initial Weight (lbs): 306 lbs     Cumulative weight loss (lbs): 29  Weight Loss Percentage: 9.48%     Wt Readings from Last 5 Encounters:   06/27/23 125.6 kg (277 lb)   06/20/23 125.8 kg (277 lb 6.4 oz)   06/07/23 129.7 kg (286 lb)   04/06/23 136.1 kg (300 lb)   02/24/23 136.5 kg (301 lb)             6/26/2023    12:41 PM   Changes and Difficulties   With regards to my diet, I am still struggling with: feeling full/hungry, binge eating   I have made the following changes to my activity/exercise since my last visit: gym 1-2x a week 45-60 mins         MEDICATIONS:   Current Outpatient Medications   Medication Sig Dispense Refill    amoxicillin (AMOXIL) 500 MG capsule Take 1 capsule (500 mg) by  "mouth 2 times daily for 10 days 20 capsule 0    naltrexone (DEPADE/REVIA) 50 MG tablet Take 1/2 tablet once daily 1-2 hours prior to worst cravings for 1 week, then increase to 1 tablet daily as directed if tolerating 30 tablet 3    phentermine (ADIPEX-P) 30 MG capsule Take 1 capsule (30 mg) by mouth every morning 30 capsule 3           6/26/2023    12:41 PM   Weight Loss Medication History Reviewed With Patient   Which weight loss medications are you currently taking on a regular basis? Naltrexone    Phentermine   Are you having any side effects from the weight loss medication that we have prescribed you? No             Objective    Ht 1.702 m (5' 7\")   Wt 125.6 kg (277 lb)   BMI 43.38 kg/m      Vitals - Patient Reported  Weight (Patient Reported): 125.6 kg (277 lb)  Height (Patient Reported): 170.2 cm (5' 7\")  BMI (Based on Pt Reported Ht/Wt): 43.38  Pain Score: No Pain (0)      Vitals:  No vitals were obtained today due to virtual visit.    PHYSICAL EXAM:  GENERAL: Healthy, alert and no distress  EYES: Eyes grossly normal to inspection.  No discharge or erythema, or obvious scleral/conjunctival abnormalities.  RESP: No audible wheeze, cough, or visible cyanosis.  No visible retractions or increased work of breathing.    SKIN: Visible skin clear. No significant rash, abnormal pigmentation or lesions.  NEURO: Cranial nerves grossly intact.  Mentation and speech appropriate for age.  PSYCH: Mentation appears normal, affect normal/bright, judgement and insight intact, normal speech and appearance well-groomed.        Sincerely,    LAWRENCE WISE PA-C      26 minutes spent by me on the date of the encounter doing chart review, history and exam, documentation and further activities per the note  "

## 2023-06-27 NOTE — PATIENT INSTRUCTIONS
"Brayan gO,   Thank you for allowing us the privilege of caring for you. We hope we provided you with the excellent service you deserve.   Please let us know if there is anything else we can do for you so that we can be sure you are completely satisfied with your care experience.    To ensure the quality of our services you may be receiving a patient satisfaction survey from an independent patient satisfaction monitoring company.    The greatest compliment you can give is a \"Likely to Recommend\"    Your visit was with LAWRENCE WISE PA-C today.    Instructions per today's visit:     Stop Naltrexone - no effect on weight.   Continue Phentermine 30mg once daily. Refills sent   Follow up with Lawrence Crowder in 2 months     Keep up the great work!     ___________________________________________________________________________  Important contact and scheduling information:  Please call our contact center at [phone] to schedule your next appointments.  For any nursing questions or concerns call Maribell Jay LPN at [phone] or Phyllis Cabral RN at [phone]  Please call during clinic hours Monday through Friday 8:00a - 4:00p if you have questions or you can contact us via [employer] at anytime and we will reply during clinic hours.    Lab results will be communicated through My Chart or letter (if My Chart not used). Please call the clinic if you have not received communication after 1 week or if you have any questions.?  Clinic Fax: [phone]  __________________________________________________________________________    If labs were ordered today:    Please make an appointment to have them drawn at your convenience.     To schedule the Lab Appointment using [employer]:  Select \"Schedule an Appointment\"  Select \"Lab Only\"  For \"A couple of questions\", select \"Other\"  For \"Which locations work for you?, select the location and set up the appointment    To schedule by phone call [phone] to schedule a lab only " appointment at any [facility] lab.  ___________________________________________________________________________  Work with A [facility] !  Virtual Sessions are Available through [facility] Weight Management Clinics    To learn more, call to schedule a free, Health  Q&A appointment: [phone]     What is Health Coaching?  Do you know what you are supposed to do, but you just aren't doing it?  Then, HEALTH COACHING may help you!   Get unstuck and move forward with the support of a professionally trained [employer]-HWC (National Board-Certified Health and ) who uses evidence-based approaches to help you move forward with healthy lifestyle changes in the areas of weight loss, stress management and overall well-being.    Health Coaches help you identify goals that will work best for you. Health Coaches provide support and encouragement with overcoming barriers and help you to find inspiration and motivation to lead a healthy lifestyle.    Option one:  Health Coaching 3-Pack; Three, 30-minute Health Coaching Visits, for $99  Visits are done virtually (phone or video)  This is a self pay service; we do not accept insurance for [facility] coaching.    Option two:   The 24 week Plan; 11 Health Coaching Visits, and a 7 months subscription to [employer]-- on-demand fitness, nutrition and mindfulness classes, for $499 (employee discounts may be available). Participants will also meet regularly with a weight management Medical Provider and a Registered/Licensed Dietician.  This is a self-pay service; we do not accept insurance for health coaching.    To Schedule a free Health  Q&A appointment to learn more,  call [phone].  ____________________________________________________________________    [facility]   Healthy Lifestyle Virtual Support Group    Healthy Lifestyle Virtual Support Group?  This is 60 minutes of small group guided discussion, support  and resources. All are welcome who want a healthy lifestyle.  WHEN: Starting in July 2023, this group meets the 1st Friday of the month from 12:30 PM - 1:30 PM virtually using [employer] Teams.    FACILITATOR: Led by National Board Certified Health and , Nika Myers [facility].   TO REGISTER: Please send an email to Nika [email or web address] to receive monthly invites to the group or if you have any questions about having a health .  Prior to the meeting, a link with directions on how to join the meeting will be sent to you.    2023 Meetings  May 19: Let's Talk  June 9: Create Your Coaching Toolkit: Learn How to  Yourself  July 7: Let's Talk  August 4: Benefits of Fiber with ALEJANDRA Pettit  September 1: Show and Tell (share your aps, podcasts, recipes, hacks, books)  October 6 :Let's Talk  November 3: Introduction to Mindfulness   December 1: Let's Talk    If you would like bariatric surgery specific support group info please let your care team know.         Thank you,   [facility] Comprehensive Weight Management Team

## 2023-06-27 NOTE — PROGRESS NOTES
"Virtual Visit Details    Type of service:  Video Visit   Video Start Time: {video visit start/end time for provider to select:599371}  Video End Time:{video visit start/end time for provider to select:329588}    Originating Location (pt. Location): {video visit patient location:956004::\"Home\"}  {PROVIDER LOCATION On-site should be selected for visits conducted from your clinic location or adjoining [facility] hospital, academic office, or other nearby [facility] building. Off-site should be selected for all other provider locations, including home:748032}  Distant Location (provider location):  {virtual location provider:531354}  Platform used for Video Visit: {Virtual Visit Platforms:517227::\"[employer]\"}  "

## 2023-06-27 NOTE — ASSESSMENT & PLAN NOTE
Last seen 4/2023. Increased Phentermine 30mg and start Naltrexone 50mg. Has lost 30lbs and 10% of TBW.     Phentermine 30mg has been very helpful with day time hunger. No side effects. BP WNL.     Naltrexone has no effect on hunger or cravings in the evenings. Still having increase snacking. No side effects. Will stop today.     Discussed topiramate to help with evening hunger. However, is not taking birth control and is sexually active. With risk of birth defects, would want her to be on birth control prior to starting. Can consider in the future if so.     Discussed starting a GLP-1. Can consider starting in the future if weight loss pleatus.

## 2023-07-06 ENCOUNTER — TELEPHONE (OUTPATIENT)
Dept: ENDOCRINOLOGY | Facility: CLINIC | Age: 28
End: 2023-07-06
Payer: COMMERCIAL

## 2023-07-06 NOTE — TELEPHONE ENCOUNTER
NATE and sent mychart for patient to schedule:    2 mo follow-up with Sofia Villatoro (around 8/27/23)

## 2023-08-07 NOTE — PROGRESS NOTES
"Lenka Adams is a 27 year old female who is being evaluated via a billable video visit.      The patient has been notified of following:     \"This video visit will be conducted via a call between you and your physician/provider. We have found that certain health care needs can be provided without the need for an in-person physical exam.  This service lets us provide the care you need with a video conversation.  If a prescription is necessary we can send it directly to your pharmacy.  If lab work is needed we can place an order for that and you can then stop by our lab to have the test done at a later time.    Video visits are billed at different rates depending on your insurance coverage.  Please reach out to your insurance provider with any questions.    If during the course of the call the physician/provider feels a video visit is not appropriate, you will not be charged for this service.\"    Patient has given verbal consent for Video visit? Yes  How would you like to obtain your AVS? MyChart  If you are dropped from the video visit, the video invite should be resent to: Text to cell phone: [phone]  Will anyone else be joining your video visit? No  {If patient encounters technical issues they should call [phone]      Video-Visit Details    Type of service:  Video Visit    Video Start Time: 9:30 am  Video End Time: 9:48 am    Originating Location (pt. Location): Home    Distant Location (provider location):  Offsite (providers home)     Platform used for Video Visit: [employer]    During this virtual visit the patient is located in [place], patient verifies this as the location during the entirety of this visit.     Weight Management Nutrition Consultation    Lenka Adams is a 27 year old female presents today for weight management nutrition consultation.  Patient referred by Sofia Villatoro PA-C on February 21, 2023.    Patient with Co-morbidities of obesity including:  Type II DM no  Renal " "Failure no  Sleep apnea no  Hypertension no   Dyslipidemia no  Joint pain no  Back pain yes  GERD no     PMH: fatigue, irregular menses    Anthropometrics:  Highest weight in life: 320 lbs  Weight 2/21/23: 306 lbs with BMI 47.93    Estimated body mass index is 43.54 kg/m  as calculated from the following:    Height as of 6/27/23: 1.702 m (5' 7\").    Weight as of this encounter: 126.1 kg (278 lb).     Current: 278 lbs, pt report    Medications for Weight Loss:  Phentermine  Naltrexone added 4/6/23 per chart review    Supplements:  Vit D 1,000 international unit(s)   Multivitamin with 1,000 international unit(s) of Vitamin D as well    Labs 2/22/23  Vit D 14  PTH WNL    Labs 5/4/23  PTH WNL  Vit D improved to 35, decreased vitamin D from 5,000 to 2,000 international unit(s)/day.     NUTRITION HISTORY  NKFA  Some possible lactose or dairy intolerance  per pt- really likes yogurt and cheese. Does not like regular milk, prefers almond.   Cultural preferences: , lots of rice/beans/tortillas growing   Loves fish/seafood     Has never worked with a RD. No nutrition education in school but has looked up stuff online.     Pt goals: be/feel healthier, prevent future health issues    Recent 15 lb weight loss - portion control. Allowing self foods such as cake still but smaller amounts.  Trying to focus on nutrients over just eating - snacking on vegetables/fruit over chips/crackers. Realized she wasn t feeling well mentally with the way she was eating and wanted to make changes. Feels good to challenge self and achieve/make changes.    Growing up ate whatever per pt - didn t know what was nutritious vs not or what her body needed.    Please see previous RD notes for more information    June 2023:  No questions/concerns today per pt. Feels things are going well.    Not tracking kcal currently. Focusing on plate method - trying to focus on more vegetables.    Protein: fish, tuna, shrimp, chicken, beans, " tofu  Carbohydrates: trying to have brown rice over white, bread occ, oatmeal, starchy vegetables (potato, corn), fruit    Trying to snack on fruit over chips. Lots of apples/oranges/grapefruit/strawberries/raspberries/blueberries/blackberries. Some bananas     Hydration: water, water with zhanna, ICE drinks, flavored sparkling water   - No concern with hydration per pt. Using zhanna instead of juice and sparkling water instead of pop    PA: trying to go to the gym 2x/week for 60 min each    August 2023:    Pt reports eating has looked different lately - was on vacation. Eating out more.     Doing well listening to hunger/satiety overall she believes    Typically eating 2 meals/day (lunch/dinner). Not real hungry in morning time. Hunger in evenings - will snack on something such as oatmeal, yogurt with fruit or protein shake    Typical day  L - salad OR chicken/tuna salad  D - varies, example meal: chicken or shrimp fajitas with carb balance tortillas OR salmon with veggies OR beans/rice OR air fryer chicken wings   Snack - oatmeal, yogurt with fruit or protein shake    Hydration: water, zhanna or crystal light occ    PA: going to the gym 1-2x/week, more walking     Previous goals:  1. Continue with Plate Method as able - Going well per pt  2. Continue working on finding more nutritious alternatives  example zhanna instead of juice - Met, mainly drinking water  3. Aim to go to the gym 2x/week - Going to gym at least 1x/week. 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    Nutrition Diagnosis  Food and nutrition related knowledge deficit r/t lack of prior exposure to nutrition  education aeb pt report and interest in learning     Obesity r/t long history of positive energy balance aeb BMI >30.    Nutrition Intervention  Assessed learning type: [place]  Reviewed previous information  Answered pt questions  Coordination of care   Nutrition education -   Discussed macronutrient and micronutrients.   Provided examples of protein, carbohydrates and fat. Discussed types of each that are recommended more often than others for overall health.     AVS and handouts via [employer]    Patient demonstrates understanding.    Expected Engagement: good     Nutrition Goals/Resources:  Recommend aiming for 3 meals/day, even if something small for third meal such as protein shake   Consider journaling intake to help identify patterns/trends     Keep up the hard work!    Macronutrients:  Protein - want leaner protein more often than fattier protein (red meat such as beef and pork being fattier)    Carbohydrates     Fat    Micronutrients:   Vitamins   Minerals   - We obtain these from our macronutrient    Protein examples:   Chicken  Turkey  Fish  Seafood (shrimp, lobster, crab, etc)  Lean cuts of beef (93% ground, sirloin, tenderloin, etc)  Pork (limit fatter options like oreilly)  Cottage cheese  Greek yogurt  Eggs   Low-fat Cheese  Lentils/beans  Nuts/nut butter (recommend a smaller portion if doing these options - 2 Tbsp of nuts or 1/4 cup nut butter)  Tofu  Seitan     Unsaturated fat examples:    - Nuts   - Seeds   - Avocado   - Oils    - Omega 3s (from fish for example)    *Want to aim for unsaturated fats more often than saturated     Types of fats to help further clarify:   https://www.hsp.[employer].edu/nutritionsource/what-should-you-eat/fats-and-cholesterol/types-of-fat/     Carbohydrate examples:    - Brown/Wild rice   - Egg life wrap (low carb and great source of protein!)    - Whole wheat pasta   - Protein pasta (barilla protein +, lentil pasta, etc)   - Whole wheat or carb balance tortilla   - Oatmeal     - Fruit   - Starchy vegetables (corn, peas, beans/lentils, potatoes, winter squash)   - Whole grain crackers    - Whole wheat waffle    - [place] protein waffles/pancakes    - Couscous     Non-starchy Vegetables Examples:   - Lettuce   - Spinach   - Onions    - Bell pepper   - Carrots   - Broccoli   - Cabbage   - Brussel sprouts   - Cauliflower    - Asparagus    - Cucumber   - Artichoke   - Zucchini   - Bean sprouts   - Mushrooms   - Sugar snap peas   - Eggplant   - Turnips   - Celery   - Radishes    - Green beans    Starchy vegetable examples:  Corn  Green peas  Winter squash (butternut, spaghetti squash, acorn)  Beans/lentils (not including green beans)  Potatoes  Sweet potatoes     Plate method can be used for general guidance on balanced meals/portion sizes (see link below for picture/more information)                 Make 1/2 (1 cup+) your plate non starchy vegetable(cauliflower, broccoli, asparagus, [place] sprouts, lettuce, carrots, for example).                3+ oz lean protein sources (salmon/skinless chicken/turkey breast/pork loin/lean cuts of beef/ or non-animal protein such as black, kidney or freeman beans, tofu/edamame/tempeh)     (Note: 3 oz = deck of cards size)                 1/2 to 1 cup carbohydrate choices such as whole grain starches or starchy vegetables or fruit. For example: quinoa, brown rice, barley, potatoes, sweet potatoes, winter squash, peas, corn, or fruit.               Choose ~0-2 added fat servings at a meal (avocados, nut butters, nuts or seeds, olive oil, vegetable oils).    The Plate Method:  https://www.cdc.gov/diabetes/images/managing/Diabetes-Manage-Eat-Well-Plate-Graphic_600px.jpg    Follow-Up:  Tuesday, October 24th at 11:00 am    Time spent with patient: 18 minutes.  ABDIEL Doty, RD, LD

## 2023-08-08 ENCOUNTER — VIRTUAL VISIT (OUTPATIENT)
Dept: ENDOCRINOLOGY | Facility: CLINIC | Age: 28
End: 2023-08-08
Payer: COMMERCIAL

## 2023-08-08 VITALS — WEIGHT: 278 LBS | BODY MASS INDEX: 43.54 KG/M2

## 2023-08-08 DIAGNOSIS — E66.9 OBESITY: ICD-10-CM

## 2023-08-08 DIAGNOSIS — Z71.3 NUTRITIONAL COUNSELING: Primary | ICD-10-CM

## 2023-08-08 PROCEDURE — 99207 PR NO CHARGE LOS: CPT | Mod: VID | Performed by: DIETITIAN, REGISTERED

## 2023-08-08 PROCEDURE — 97803 MED NUTRITION INDIV SUBSEQ: CPT | Mod: VID | Performed by: DIETITIAN, REGISTERED

## 2023-08-08 ASSESSMENT — PAIN SCALES - GENERAL: PAINLEVEL: NO PAIN (0)

## 2023-08-08 NOTE — PATIENT INSTRUCTIONS
Nutrition Goals/Resources:  Recommend aiming for 3 meals/day, even if something small for third meal such as protein shake   Consider journaling intake to help identify patterns/trends     Keep up the hard work!    Macronutrients:  Protein - want leaner protein more often than fattier protein (red meat such as beef and pork being fattier)    Carbohydrates     Fat    Micronutrients:   Vitamins   Minerals   - We obtain these from our macronutrient    Protein examples:   Chicken  Turkey  Fish  Seafood (shrimp, lobster, crab, etc)  Lean cuts of beef (93% ground, sirloin, tenderloin, etc)  Pork (limit fatter options like oreilly)  Cottage cheese  Greek yogurt  Eggs   Low-fat Cheese  Lentils/beans  Nuts/nut butter (recommend a smaller portion if doing these options - 2 Tbsp of nuts or 1/4 cup nut butter)  Tofu  Seitan     Unsaturated fat examples:    - Nuts   - Seeds   - Avocado   - Oils    - Omega 3s (from fish for example)    *Want to aim for unsaturated fats more often than saturated     Types of fats to help further clarify:   https://www.[facility].[place].edu/nutritionsource/what-should-you-eat/fats-and-cholesterol/types-of-fat/     Carbohydrate examples:    - Brown/Wild rice   - Egg life wrap (low carb and great source of protein!)    - Whole wheat pasta   - Protein pasta (barilla protein +, lentil pasta, etc)   - Whole wheat or carb balance tortilla   - Oatmeal    - Fruit   - Starchy vegetables (corn, peas, beans/lentils, potatoes, winter squash)   - Whole grain crackers    - Whole wheat waffle    - [place] protein waffles/pancakes    - Couscous     Non-starchy Vegetables Examples:   - Lettuce   - Spinach   - Onions    - Bell pepper   - Carrots   - Broccoli   - Cabbage   - Brussel sprouts   - Cauliflower    - Asparagus    - Cucumber   - Artichoke   - Zucchini   - Bean sprouts   - Mushrooms   - Sugar snap peas   - Eggplant   - Turnips   - Celery   - Radishes    - Green beans    Starchy vegetable examples:  Corn  Green  peas  Winter squash (butternut, spaghetti squash, acorn)  Beans/lentils (not including green beans)  Potatoes  Sweet potatoes     Plate method can be used for general guidance on balanced meals/portion sizes (see link below for picture/more information)                 Make 1/2 (1 cup+) your plate non starchy vegetable(cauliflower, broccoli, asparagus, [place] sprouts, lettuce, carrots, for example).                3+ oz lean protein sources (salmon/skinless chicken/turkey breast/pork loin/lean cuts of beef/ or non-animal protein such as black, kidney or freeman beans, tofu/edamame/tempeh)     (Note: 3 oz = deck of cards size)                 1/2 to 1 cup carbohydrate choices such as whole grain starches or starchy vegetables or fruit. For example: quinoa, brown rice, barley, potatoes, sweet potatoes, winter squash, peas, corn, or fruit.               Choose ~0-2 added fat servings at a meal (avocados, nut butters, nuts or seeds, olive oil, vegetable oils).    The Plate Method:  https://www.cdc.gov/diabetes/images/managing/Diabetes-Manage-Eat-Well-Plate-Graphic_600px.jpg    Follow-Up:  Tuesday, October 24th at 11:00 am    ABDIEL Gay, RD, LD  Clinic #: [phone]

## 2023-08-08 NOTE — LETTER
"8/8/2023       RE: Lenka Adams  [address]  [place] [address]     Dear Colleague,    Thank you for referring your patient, Lenka Adams, to the [facility] [place] at [facility]. Please see a copy of my visit note below.    Lenka Adams is a 27 year old female who is being evaluated via a billable video visit.      The patient has been notified of following:     \"This video visit will be conducted via a call between you and your physician/provider. We have found that certain health care needs can be provided without the need for an in-person physical exam.  This service lets us provide the care you need with a video conversation.  If a prescription is necessary we can send it directly to your pharmacy.  If lab work is needed we can place an order for that and you can then stop by our lab to have the test done at a later time.    Video visits are billed at different rates depending on your insurance coverage.  Please reach out to your insurance provider with any questions.    If during the course of the call the physician/provider feels a video visit is not appropriate, you will not be charged for this service.\"    Patient has given verbal consent for Video visit? Yes  How would you like to obtain your AVS? MyChart  If you are dropped from the video visit, the video invite should be resent to: Text to cell phone: [phone]  Will anyone else be joining your video visit? No  {If patient encounters technical issues they should call [phone]      Video-Visit Details    Type of service:  Video Visit    Video Start Time: 9:30 am  Video End Time: 9:48 am    Originating Location (pt. Location): Home    Distant Location (provider location):  Offsite (providers home)     Platform used for Video Visit: [employer]    During this virtual visit the patient is located in [place], patient verifies this as the " "location during the entirety of this visit.     Weight Management Nutrition Consultation    Lenka Adams is a 27 year old female presents today for weight management nutrition consultation.  Patient referred by Sofia Villatoro PA-C on February 21, 2023.    Patient with Co-morbidities of obesity including:  Type II DM no  Renal Failure no  Sleep apnea no  Hypertension no   Dyslipidemia no  Joint pain no  Back pain yes  GERD no     PMH: fatigue, irregular menses    Anthropometrics:  Highest weight in life: 320 lbs  Weight 2/21/23: 306 lbs with BMI 47.93    Estimated body mass index is 43.54 kg/m  as calculated from the following:    Height as of 6/27/23: 1.702 m (5' 7\").    Weight as of this encounter: 126.1 kg (278 lb).     Current: 278 lbs, pt report    Medications for Weight Loss:  Phentermine  Naltrexone added 4/6/23 per chart review    Supplements:  Vit D 1,000 international unit(s)   Multivitamin with 1,000 international unit(s) of Vitamin D as well    Labs 2/22/23  Vit D 14  PTH WNL    Labs 5/4/23  PTH WNL  Vit D improved to 35, decreased vitamin D from 5,000 to 2,000 international unit(s)/day.     NUTRITION HISTORY  NKFA  Some possible lactose or dairy intolerance  per pt- really likes yogurt and cheese. Does not like regular milk, prefers almond.   Cultural preferences: , lots of rice/beans/tortillas growing   Loves fish/seafood     Has never worked with a RD. No nutrition education in school but has looked up stuff online.     Pt goals: be/feel healthier, prevent future health issues    Recent 15 lb weight loss - portion control. Allowing self foods such as cake still but smaller amounts.  Trying to focus on nutrients over just eating - snacking on vegetables/fruit over chips/crackers. Realized she wasn t feeling well mentally with the way she was eating and wanted to make changes. Feels good to challenge self and achieve/make changes.    Growing up ate whatever per pt - didn t know what " was nutritious vs not or what her body needed.    Please see previous RD notes for more information    June 2023:  No questions/concerns today per pt. Feels things are going well.    Not tracking kcal currently. Focusing on plate method - trying to focus on more vegetables.    Protein: fish, tuna, shrimp, chicken, beans, tofu  Carbohydrates: trying to have brown rice over white, bread occ, oatmeal, starchy vegetables (potato, corn), fruit    Trying to snack on fruit over chips. Lots of apples/oranges/grapefruit/strawberries/raspberries/blueberries/blackberries. Some bananas     Hydration: water, water with zhanna, ICE drinks, flavored sparkling water   - No concern with hydration per pt. Using zhanna instead of juice and sparkling water instead of pop    PA: trying to go to the gym 2x/week for 60 min each    August 2023:    Pt reports eating has looked different lately - was on vacation. Eating out more.     Doing well listening to hunger/satiety overall she believes    Typically eating 2 meals/day (lunch/dinner). Not real hungry in morning time. Hunger in evenings - will snack on something such as oatmeal, yogurt with fruit or protein shake    Typical day  L - salad OR chicken/tuna salad  D - varies, example meal: chicken or shrimp fajitas with carb balance tortillas OR salmon with veggies OR beans/rice OR air fryer chicken wings   Snack - oatmeal, yogurt with fruit or protein shake    Hydration: water, zhanna or crystal light occ    PA: going to the gym 1-2x/week, more walking     Previous goals:  1. Continue with Plate Method as able - Going well per pt  2. Continue working on finding more nutritious alternatives  example zhanna instead of juice - Met, mainly drinking water  3. Aim to go to the gym 2x/week - Going to gym at least 1x/week. 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    Nutrition Diagnosis  Food and nutrition related knowledge deficit r/t lack of prior exposure to nutrition education aeb pt report and interest in learning     Obesity r/t long history of positive energy balance aeb BMI >30.    Nutrition Intervention  Assessed learning type: [place]  Reviewed previous information  Answered pt questions  Coordination of care   Nutrition education -   Discussed macronutrient and micronutrients.   Provided examples of protein, carbohydrates and fat. Discussed types of each that are recommended more often than others for overall health.     AVS and handouts via [email or web address]    Patient demonstrates understanding.    Expected Engagement: good     Nutrition Goals/Resources:  Recommend aiming for 3 meals/day, even if something small for third meal such as protein shake   Consider journaling intake to help identify patterns/trends     Keep up the hard work!    Macronutrients:  Protein - want leaner protein more often than fattier protein (red meat such as beef and pork being fattier)    Carbohydrates     Fat    Micronutrients:   Vitamins   Minerals   - We obtain these from our macronutrient    Protein examples:   Chicken  Turkey  Fish  Seafood (shrimp, lobster, crab, etc)  Lean cuts of beef (93% ground, sirloin, tenderloin, etc)  Pork (limit fatter options like oreilly)  Cottage cheese  Greek yogurt  Eggs   Low-fat Cheese  Lentils/beans  Nuts/nut butter (recommend a smaller portion if doing these options - 2 Tbsp of nuts or 1/4 cup nut butter)  Tofu  Seitan     Unsaturated fat examples:    - Nuts   - Seeds   - Avocado   - Oils    - Omega 3s (from fish for example)    *Want to aim for unsaturated fats more often than saturated     Types of fats to help further clarify:    https://[email or web address].[place].[facility]/nutritionsource/what-should-you-eat/fats-and-cholesterol/types-of-fat/     Carbohydrate examples:    - Brown/Wild rice   - Egg life wrap (low carb and great source of protein!)    - Whole wheat pasta   - Protein pasta (barilla protein +, lentil pasta, etc)   - Whole wheat or carb balance tortilla   - Oatmeal    - Fruit   - Starchy vegetables (corn, peas, beans/lentils, potatoes, winter squash)   - Whole grain crackers    - Whole wheat waffle    - [place] protein waffles/pancakes    - Couscous     Non-starchy Vegetables Examples:   - Lettuce   - Spinach   - Onions    - Bell pepper   - Carrots   - Broccoli   - Cabbage   - Brussel sprouts   - Cauliflower    - Asparagus    - Cucumber   - Artichoke   - Zucchini   - Bean sprouts   - Mushrooms   - Sugar snap peas   - Eggplant   - Turnips   - Celery   - Radishes    - Green beans    Starchy vegetable examples:  Corn  Green peas  Winter squash (butternut, spaghetti squash, acorn)  Beans/lentils (not including green beans)  Potatoes  Sweet potatoes     Plate method can be used for general guidance on balanced meals/portion sizes (see link below for picture/more information)                 Make 1/2 (1 cup+) your plate non starchy vegetable(cauliflower, broccoli, asparagus, [place] sprouts, lettuce, carrots, for example).                3+ oz lean protein sources (salmon/skinless chicken/turkey breast/pork loin/lean cuts of beef/ or non-animal protein such as black, kidney or freeman beans, tofu/edamame/tempeh)     (Note: 3 oz = deck of cards size)                 1/2 to 1 cup carbohydrate choices such as whole grain starches or starchy vegetables or fruit. For example: quinoa, brown rice, barley, potatoes, sweet potatoes, winter squash, peas, corn, or fruit.               Choose ~0-2 added fat servings at a meal (avocados, nut butters, nuts or seeds, olive oil, vegetable oils).    The Plate  Method:  https://www.cdc.gov/diabetes/images/managing/Diabetes-Manage-Eat-Well-Plate-Graphic_600px.jpg    Follow-Up:  Tuesday, October 24th at 11:00 am    Time spent with patient: 18 minutes.  ABDIEL Doty, RD, LD

## 2023-08-08 NOTE — NURSING NOTE
Is the patient currently in the state of [place]? YES    Visit mode:VIDEO    If the visit is dropped, the patient can be reconnected by: VIDEO VISIT: Text to cell phone: [phone]    Will anyone else be joining the visit? NO      How would you like to obtain your AVS? MyChart    Are changes needed to the allergy or medication list? NO    Reason for visit: Follow Up

## 2023-08-15 ENCOUNTER — VIRTUAL VISIT (OUTPATIENT)
Dept: ENDOCRINOLOGY | Facility: CLINIC | Age: 28
End: 2023-08-15
Payer: COMMERCIAL

## 2023-08-15 VITALS — WEIGHT: 278 LBS | HEIGHT: 67 IN | BODY MASS INDEX: 43.63 KG/M2

## 2023-08-15 DIAGNOSIS — E66.01 CLASS 3 SEVERE OBESITY WITH SERIOUS COMORBIDITY AND BODY MASS INDEX (BMI) OF 45.0 TO 49.9 IN ADULT, UNSPECIFIED OBESITY TYPE (H): Primary | ICD-10-CM

## 2023-08-15 DIAGNOSIS — E66.813 CLASS 3 SEVERE OBESITY WITH SERIOUS COMORBIDITY AND BODY MASS INDEX (BMI) OF 45.0 TO 49.9 IN ADULT, UNSPECIFIED OBESITY TYPE (H): Primary | ICD-10-CM

## 2023-08-15 PROCEDURE — 99213 OFFICE O/P EST LOW 20 MIN: CPT | Mod: VID

## 2023-08-15 RX ORDER — PHENTERMINE HYDROCHLORIDE 37.5 MG/1
37.5 TABLET ORAL EVERY MORNING
Qty: 30 TABLET | Refills: 3 | Status: SHIPPED | OUTPATIENT
Start: 2023-08-15 | End: 2024-01-02

## 2023-08-15 ASSESSMENT — PAIN SCALES - GENERAL: PAINLEVEL: NO PAIN (0)

## 2023-08-15 NOTE — PATIENT INSTRUCTIONS
"Brayan Og,   Thank you for allowing us the privilege of caring for you. We hope we provided you with the excellent service you deserve.   Please let us know if there is anything else we can do for you so that we can be sure you are completely satisfied with your care experience.    To ensure the quality of our services you may be receiving a patient satisfaction survey from an independent patient satisfaction monitoring company.    The greatest compliment you can give is a \"Likely to Recommend\"    Your visit was with LAWRENCE WISE PA-C today.    Instructions per today's visit:     Increase Phentermine 37.5mg - take 1 tablet in the morning. Check BP in 1-2 weeks.   Consider Wegovy in the future. Reach out if would like to start prior to next visit   Follow up with Lawrence Crowder in 2-3 months. Will reach out via [employer] in 1 month.   Follow up with Devorah Mendoza RD 10/24/2023     ___________________________________________________________________________  Important contact and scheduling information:  Please call our contact center at [phone] to schedule your next appointments.  For any nursing questions or concerns call Maribell Jay LPN at [phone] or Phyllis Cabral RN at [phone]  Please call during clinic hours Monday through Friday 8:00a - 4:00p if you have questions or you can contact us via [employer]Cell at anytime and we will reply during clinic hours.    Lab results will be communicated through My Chart or letter (if My Chart not used). Please call the clinic if you have not received communication after 1 week or if you have any questions.?  Clinic Fax: [phone]  __________________________________________________________________________    If labs were ordered today:    Please make an appointment to have them drawn at your convenience.     To schedule the Lab Appointment using TxCell:  Select \"Schedule an Appointment\"  Select \"Lab Only\"  For \"A couple of questions\", select \"Other\"  For \"Which locations " work for you?, select the location and set up the appointment    To schedule by phone call [phone] to schedule a lab only appointment at any [facility] lab.  ___________________________________________________________________________  Work with A Health !  Virtual Sessions are Available through [facility] Weight Management Clinics    To learn more, call to schedule a free, Health  Q&A appointment: [phone]     What is Health Coaching?  Do you know what you are supposed to do, but you just aren't doing it?  Then, HEALTH COACHING may help you!   Get unstuck and move forward with the support of a professionally trained NBC-HWC (National Board-Certified Health and ) who uses evidence-based approaches to help you move forward with healthy lifestyle changes in the areas of weight loss, stress management and overall well-being.    Health Coaches help you identify goals that will work best for you. Health Coaches provide support and encouragement with overcoming barriers and help you to find inspiration and motivation to lead a healthy lifestyle.    Option one:  Health Coaching 3-Pack; Three, 30-minute Health Coaching Visits, for $99  Visits are done virtually (phone or video)  This is a self pay service; we do not accept insurance for mitchell coaching.    Option two:   The 24 week Plan; 11 Health Coaching Visits, and a 7 months subscription to [employer]-- on-demand fitness, nutrition and mindfulness classes, for $499 (employee discounts may be available). Participants will also meet regularly with a weight management Medical Provider and a Registered/Licensed Dietician.  This is a self-pay service; we do not accept insurance for health coaching.    To Schedule a free Health  Q&A appointment to learn more,  call [phone].  ____________________________________________________________________    [facility]   Healthy Lifestyle  Virtual Support Group    Healthy Lifestyle Virtual Support Group?  This is 60 minutes of small group guided discussion, support and resources. All are welcome who want a healthy lifestyle.  WHEN: Starting in July 2023, this group meets the 1st Friday of the month from 12:30 PM - 1:30 PM virtually using Microsoft Teams.    FACILITATOR: Led by National Board Certified Health and , Nika Myers [facility].   TO REGISTER: Please send an email to Nika [email or web address] to receive monthly invites to the group or if you have any questions about having a health .  Prior to the meeting, a link with directions on how to join the meeting will be sent to you.    2023 Meetings  May 19: Let's Talk  June 9: Create Your Coaching Toolkit: Learn How to  Yourself  July 7: Let's Talk  August 4: Benefits of Fiber with ALEJANDRA Pettit  September 1: Show and Tell (share your aps, podcasts, recipes, hacks, books)  October 6 :Let's Talk  November 3: Introduction to Mindfulness   December 1: Let's Talk    If you would like bariatric surgery specific support group info please let your care team know.         Thank you,   [facility] Comprehensive Weight Management Team

## 2023-08-15 NOTE — PROGRESS NOTES
Virtual Visit Details    Type of service:  Video Visit   Video Start Time:  9:31AM  Video End Time:9:43 AM    Originating Location (pt. Location): Home    Distant Location (provider location):  On-site  Platform used for Video Visit: [facility] Medical Weight Management Note     Lenka Adams  MRN:  [id number]  :  1995  JOHN:  8/15/2023    Dear Physician No Ref-Primary,    I had the pleasure of seeing your patient Lenka Adams. She is a 27 year old female who I am continuing to see for treatment of obesity related to:        2/15/2023     3:39 PM   --   I have the following health issues associated with obesity None of the above   I have the following symptoms associated with obesity Back Pain    Fatigue       Assessment & Plan   Problem List Items Addressed This Visit          Digestive    Class 3 severe obesity with serious comorbidity and body mass index (BMI) of 45.0 to 49.9 in adult (H) - Primary     Last seen 23 and continued Phentermine. Discontinued Naltrexone due to no affect on weight loss.     Continues to have increase hunger in the evenings. Has continued to see dietitian and working on eating 3 meals a day. Would like to discuss other AOMs options to help.     Will increase Phentermine 37.5mg to help with increase hunger. Will continue to monitor BP. If this does not help with hunger and cravings in the evening, can consider starting Wegovy. No contraindications. Discussed side effects, risks, and benefits. No personal hx of pancreatitis. No personal or family hx of MTC or MENII.          Relevant Medications    phentermine (ADIPEX-P) 37.5 MG tablet      Increase Phentermine 37.5mg - take 1 tablet in the morning. Check BP in 1-2 weeks.   Consider Wegovy in the future. Reach out if would like to start prior to next visit   Follow up with Sofia Crowder in 2-3 months. Will reach out via [employer] in 1 month.   Follow up with Devorah Mendoza RD 10/24/2023     INTERVAL  HISTORY:  New MWM - 2/21/2023  Started Phentermine   4/2/2023 - Increased Phentermine 30mg once daily, Started Naltrexone   Last seen 6/27/23 - continued phentermine, stopped naltrexone due to not helping      Weight has been stable since last visit. Is frustrated that has not had results in the past 2 months.     Anti-obesity medications:     Current:   Phentermine 30mg - taking in the morning. No side effects. Still helpful with hunger. Will have to take earlier for work, at 5:30pm and will then start to feel hunger around 8pm.     Failed/contraindicated:   Topiramate - concern with not using birthcontrol   Naltrexone - trialed for 3 months. Was not helpful with weight loss    Recent diet changes: Eating 2-3 meals a day. Discussed with dietitian to eat 3 meals a day. Still having increase hunger and cravings in the evening around 8/9pm.     Recent exercise/activity changes: Walking, going to the gym 1-2xweek.     Recent stressors: No concerns     Recent sleep changes: No concerns       CURRENT WEIGHT:   278 lbs 0 oz    Initial Weight (lbs): 306 lbs  Last Visits Weight: 126.1 kg (278 lb)  Cumulative weight loss (lbs): 28  Weight Loss Percentage: 9.15%    Wt Readings from Last 5 Encounters:   08/15/23 126.1 kg (278 lb)   08/08/23 126.1 kg (278 lb)   06/27/23 125.6 kg (277 lb)   06/20/23 125.8 kg (277 lb 6.4 oz)   06/07/23 129.7 kg (286 lb)             8/15/2023     7:12 AM   Changes and Difficulties   With regards to my diet, I am still struggling with: feeling hungry in the evenings/night         MEDICATIONS:   Current Outpatient Medications   Medication Sig Dispense Refill    naltrexone (DEPADE/REVIA) 50 MG tablet Take 1/2 tablet once daily 1-2 hours prior to worst cravings for 1 week, then increase to 1 tablet daily as directed if tolerating 30 tablet 3    phentermine (ADIPEX-P) 30 MG capsule Take 1 capsule (30 mg) by mouth every morning 30 capsule 3    phentermine (ADIPEX-P) 37.5 MG tablet Take 1 tablet (37.5  "mg) by mouth every morning 30 tablet 3           8/15/2023     7:12 AM   Weight Loss Medication History Reviewed With Patient   Which weight loss medications are you currently taking on a regular basis? Phentermine   Are you having any side effects from the weight loss medication that we have prescribed you? No             Objective    Ht 1.702 m (5' 7\")   Wt 126.1 kg (278 lb)   BMI 43.54 kg/m      Vitals - Patient Reported  Pain Score: No Pain (0)      Vitals:  No vitals were obtained today due to virtual visit.    PHYSICAL EXAM:  GENERAL: Healthy, alert and no distress  EYES: Eyes grossly normal to inspection.  No discharge or erythema, or obvious scleral/conjunctival abnormalities.  RESP: No audible wheeze, cough, or visible cyanosis.  No visible retractions or increased work of breathing.    SKIN: Visible skin clear. No significant rash, abnormal pigmentation or lesions.  NEURO: Cranial nerves grossly intact.  Mentation and speech appropriate for age.  PSYCH: Mentation appears normal, affect normal/bright, judgement and insight intact, normal speech and appearance well-groomed.        Sincerely,    LAWRENCE WISE PA-C      23 minutes spent by me on the date of the encounter doing chart review, history and exam, documentation and further activities per the note  "

## 2023-08-15 NOTE — ASSESSMENT & PLAN NOTE
Last seen 6/27/23 and continued Phentermine. Discontinued Naltrexone due to no affect on weight loss.     Continues to have increase hunger in the evenings. Has continued to see dietitian and working on eating 3 meals a day. Would like to discuss other AOMs options to help.     Will increase Phentermine 37.5mg to help with increase hunger. Will continue to monitor BP. If this does not help with hunger and cravings in the evening, can consider starting Wegovy. No contraindications. Discussed side effects, risks, and benefits. No personal hx of pancreatitis. No personal or family hx of MTC or MENII.

## 2023-08-15 NOTE — LETTER
8/15/2023       RE: Lenka Adams  [address]  [place] [address]     Dear Colleague,    Thank you for referring your patient, Lenka Adams, to the [facility] WEIGHT MANAGEMENT CLINIC [place] at [facility]. Please see a copy of my visit note below.    Virtual Visit Details    Type of service:  Video Visit   Video Start Time:  9:31AM  Video End Time:9:43 AM    Originating Location (pt. Location): Home    Distant Location (provider location):  On-site  Platform used for Video Visit: [facility] Medical Weight Management Note     Lenka Adams  MRN:  [id number]  :  1995  JOHN:  8/15/2023    Dear Physician No Ref-Primary,    I had the pleasure of seeing your patient Lenka Adams. She is a 27 year old female who I am continuing to see for treatment of obesity related to:        2/15/2023     3:39 PM   --   I have the following health issues associated with obesity None of the above   I have the following symptoms associated with obesity Back Pain    Fatigue       Assessment & Plan  Problem List Items Addressed This Visit          Digestive    Class 3 severe obesity with serious comorbidity and body mass index (BMI) of 45.0 to 49.9 in adult (H) - Primary     Last seen 23 and continued Phentermine. Discontinued Naltrexone due to no affect on weight loss.     Continues to have increase hunger in the evenings. Has continued to see dietitian and working on eating 3 meals a day. Would like to discuss other AOMs options to help.     Will increase Phentermine 37.5mg to help with increase hunger. Will continue to monitor BP. If this does not help with hunger and cravings in the evening, can consider starting Wegovy. No contraindications. Discussed side effects, risks, and benefits. No personal hx of pancreatitis. No personal or family hx of MTC or MENII.          Relevant Medications    phentermine  (ADIPEX-P) 37.5 MG tablet      Increase Phentermine 37.5mg - take 1 tablet in the morning. Check BP in 1-2 weeks.   Consider Wegovy in the future. Reach out if would like to start prior to next visit   Follow up with Sofia Crowder in 2-3 months. Will reach out via [employer] in 1 month.   Follow up with Devorah Mendoza RD 10/24/2023     INTERVAL HISTORY:  New MWM - 2/21/2023  Started Phentermine   4/2/2023 - Increased Phentermine 30mg once daily, Started Naltrexone   Last seen 6/27/23 - continued phentermine, stopped naltrexone due to not helping      Weight has been stable since last visit. Is frustrated that has not had results in the past 2 months.     Anti-obesity medications:     Current:   Phentermine 30mg - taking in the morning. No side effects. Still helpful with hunger. Will have to take earlier for work, at 5:30pm and will then start to feel hunger around 8pm.     Failed/contraindicated:   Topiramate - concern with not using birthcontrol   Naltrexone - trialed for 3 months. Was not helpful with weight loss    Recent diet changes: Eating 2-3 meals a day. Discussed with dietitian to eat 3 meals a day. Still having increase hunger and cravings in the evening around 8/9pm.     Recent exercise/activity changes: Walking, going to the gym 1-2xweek.     Recent stressors: No concerns     Recent sleep changes: No concerns       CURRENT WEIGHT:   278 lbs 0 oz    Initial Weight (lbs): 306 lbs  Last Visits Weight: 126.1 kg (278 lb)  Cumulative weight loss (lbs): 28  Weight Loss Percentage: 9.15%    Wt Readings from Last 5 Encounters:   08/15/23 126.1 kg (278 lb)   08/08/23 126.1 kg (278 lb)   06/27/23 125.6 kg (277 lb)   06/20/23 125.8 kg (277 lb 6.4 oz)   06/07/23 129.7 kg (286 lb)             8/15/2023     7:12 AM   Changes and Difficulties   With regards to my diet, I am still struggling with: feeling hungry in the evenings/night         MEDICATIONS:   Current Outpatient Medications   Medication Sig Dispense Refill     "naltrexone (DEPADE/REVIA) 50 MG tablet Take 1/2 tablet once daily 1-2 hours prior to worst cravings for 1 week, then increase to 1 tablet daily as directed if tolerating 30 tablet 3    phentermine (ADIPEX-P) 30 MG capsule Take 1 capsule (30 mg) by mouth every morning 30 capsule 3    phentermine (ADIPEX-P) 37.5 MG tablet Take 1 tablet (37.5 mg) by mouth every morning 30 tablet 3           8/15/2023     7:12 AM   Weight Loss Medication History Reviewed With Patient   Which weight loss medications are you currently taking on a regular basis? Phentermine   Are you having any side effects from the weight loss medication that we have prescribed you? No             Objective   Ht 1.702 m (5' 7\")   Wt 126.1 kg (278 lb)   BMI 43.54 kg/m      Vitals - Patient Reported  Pain Score: No Pain (0)      Vitals:  No vitals were obtained today due to virtual visit.    PHYSICAL EXAM:  GENERAL: Healthy, alert and no distress  EYES: Eyes grossly normal to inspection.  No discharge or erythema, or obvious scleral/conjunctival abnormalities.  RESP: No audible wheeze, cough, or visible cyanosis.  No visible retractions or increased work of breathing.    SKIN: Visible skin clear. No significant rash, abnormal pigmentation or lesions.  NEURO: Cranial nerves grossly intact.  Mentation and speech appropriate for age.  PSYCH: Mentation appears normal, affect normal/bright, judgement and insight intact, normal speech and appearance well-groomed.        Sincerely,    LAWRENCE WISE PA-C      23 minutes spent by me on the date of the encounter doing chart review, history and exam, documentation and further activities per the note    "

## 2023-08-15 NOTE — NURSING NOTE
Is the patient currently in the state of [place]? YES    Visit mode:VIDEO    If the visit is dropped, the patient can be reconnected by: VIDEO VISIT: Text to cell phone: [phone]    Will anyone else be joining the visit? NO      How would you like to obtain your AVS? MyChart    Are changes needed to the allergy or medication list? NO    Reason for visit: RECHECK (RATNA)      Elidia Stokes, VF

## 2023-09-03 ENCOUNTER — E-VISIT (OUTPATIENT)
Dept: FAMILY MEDICINE | Facility: CLINIC | Age: 28
End: 2023-09-03
Payer: COMMERCIAL

## 2023-09-03 DIAGNOSIS — R10.2 PELVIC CRAMPING: Primary | ICD-10-CM

## 2023-09-03 DIAGNOSIS — K59.09 OTHER CONSTIPATION: ICD-10-CM

## 2023-09-03 PROCEDURE — 99207 PR NON-BILLABLE SERV PER CHARTING: CPT | Performed by: FAMILY MEDICINE

## 2023-09-03 NOTE — PATIENT INSTRUCTIONS
Thank you for choosing us for your care. I think a virtual video visit with me would be best next steps based on your symptoms. In the meantime, I would start miralax (one capful daily, can adjust up or down from there) to improve your stools (ideal is daily, curvy, with no straining).  We can see what that does for your symptoms and go from there at the visit.    Please schedule a clinic appointment; you won t be charged for this eVisit.      You can schedule an appointment right here in [facility], or call [phone]

## 2023-09-14 ENCOUNTER — TELEPHONE (OUTPATIENT)
Dept: ENDOCRINOLOGY | Facility: CLINIC | Age: 28
End: 2023-09-14
Payer: COMMERCIAL

## 2023-09-14 DIAGNOSIS — E66.01 CLASS 3 SEVERE OBESITY WITH SERIOUS COMORBIDITY AND BODY MASS INDEX (BMI) OF 45.0 TO 49.9 IN ADULT, UNSPECIFIED OBESITY TYPE (H): Primary | ICD-10-CM

## 2023-09-14 DIAGNOSIS — E66.813 CLASS 3 SEVERE OBESITY WITH SERIOUS COMORBIDITY AND BODY MASS INDEX (BMI) OF 45.0 TO 49.9 IN ADULT, UNSPECIFIED OBESITY TYPE (H): Primary | ICD-10-CM

## 2023-09-14 NOTE — TELEPHONE ENCOUNTER
PA Initiation    Medication: SAXENDA 18 MG/3ML SC SOPN  Insurance Company: [employer] - Phone [phone] Fax [phone]  Pharmacy Filling the Rx: [facility] - [place], [place] - [facility] [address]  Filling Pharmacy Phone: [phone]  Filling Pharmacy Fax: [phone]  Start Date: 9/14/2023

## 2023-09-15 NOTE — TELEPHONE ENCOUNTER
Prior Authorization Approval    Medication: SAXENDA 18 MG/3ML SC SOPN  Authorization Effective Date: 9/14/2023  Authorization Expiration Date: 1/4/2024  Approved Dose/Quantity: 1 month  Reference #: CMM KEY [id number]   Insurance Company: [employer] Phone [phone] Fax [phone]  Expected CoPay:       CoPay Card Available:      Financial Assistance Needed: N/A  Which Pharm[facility] is filling the prescription[facility] - [facility] [address]  Pharmacy Notified: Yes  Patient Notified: Yes

## 2023-09-15 NOTE — TELEPHONE ENCOUNTER
PA Approved for [facility]. Order sent to pharmacy. Closing encounter

## 2023-10-03 ENCOUNTER — E-VISIT (OUTPATIENT)
Dept: URGENT CARE | Facility: CLINIC | Age: 28
End: 2023-10-03
Payer: COMMERCIAL

## 2023-10-03 ENCOUNTER — MYC MEDICAL ADVICE (OUTPATIENT)
Dept: FAMILY MEDICINE | Facility: CLINIC | Age: 28
End: 2023-10-03

## 2023-10-03 DIAGNOSIS — N89.8 VAGINAL DISCHARGE: Primary | ICD-10-CM

## 2023-10-03 PROCEDURE — 99421 OL DIG E/M SVC 5-10 MIN: CPT | Performed by: EMERGENCY MEDICINE

## 2023-10-03 NOTE — TELEPHONE ENCOUNTER
Huddled with MP.  Recommendation to wait until at least Friday to complete wet prep now that boric acid treatment has been initiated.  Kezia TEMPLE RN

## 2023-10-03 NOTE — PATIENT INSTRUCTIONS
Vaginitis: Care Instructions  Overview     Vaginitis is soreness or infection of the vagina. This common problem can cause itching and burning. And it can cause a change in vaginal discharge. Sometimes it can cause pain during sex. Vaginitis may be caused by bacteria, yeast, or other germs. Some infections that cause it are caught from a sexual partner. Bath products, spermicides, and douches can irritate the vagina too.  This problem can also happen during and after menopause. A drop in estrogen levels during this time can cause dryness, soreness, and pain during sex.  Your doctor can give you medicine to treat vaginitis. And home care may help you feel better. For certain types of infections, your sex partner(s) must be treated too.  Follow-up care is a key part of your treatment and safety. Be sure to make and go to all appointments, and call your doctor if you are having problems. It's also a good idea to know your test results and keep a list of the medicines you take.  How can you care for yourself at home?    Take your medicines exactly as prescribed. Call your doctor if you think you are having a problem with your medicine.    Ask your doctor if your sex partner(s) also needs treatment.    Do not eat or drink anything that has alcohol if you are taking metronidazole (Flagyl).    Wash your vulva daily with water. You also can use a mild, unscented soap if you want.    Do not use scented bath products. And do not use vaginal sprays or douches.    Change out of wet or damp clothes as soon as possible. Wear cotton underwear. And avoid tight clothing that could increase moisture.    Put a washcloth soaked in cool water on the area to relieve itching. Or you can take cool baths.    If you have dryness because of menopause, use estrogen cream or pills that your doctor prescribes.    Ask your doctor about when it is okay to have sex.    Use a personal lubricant before sex if you have dryness. Examples are  "Astroglide, K-Y Jelly, and Wet Lubricant Gel.  When should you call for help?   Call your doctor now or seek immediate medical care if:      You have a fever.       You have new or increased pain in your vagina or pelvis.       You have new or worse vaginal itching or discharge.   Watch closely for changes in your health, and be sure to contact your doctor if:      You have bleeding other than your period.       You do not get better as expected.   Where can you learn more?  Go to https://www.[employer].net/patiented  Enter F219 in the search box to learn more about \"Vaginitis: Care Instructions.\"  Current as of: August 2, 2022               Content Version: 13.7    3436-5355 [employer].   Care instructions adapted under license by your healthcare professional. If you have questions about a medical condition or this instruction, always ask your healthcare professional. [employer] disclaims any warranty or liability for your use of this information.      "

## 2023-10-04 ENCOUNTER — LAB (OUTPATIENT)
Dept: LAB | Facility: CLINIC | Age: 28
End: 2023-10-04
Payer: COMMERCIAL

## 2023-10-04 DIAGNOSIS — B96.89 BACTERIAL VAGINOSIS: Primary | ICD-10-CM

## 2023-10-04 DIAGNOSIS — N89.8 VAGINAL DISCHARGE: ICD-10-CM

## 2023-10-04 DIAGNOSIS — N76.0 BACTERIAL VAGINOSIS: Primary | ICD-10-CM

## 2023-10-04 LAB
CLUE CELLS: PRESENT
TRICHOMONAS, WET PREP: ABNORMAL
WBC'S/HIGH POWER FIELD, WET PREP: ABNORMAL
YEAST, WET PREP: ABNORMAL

## 2023-10-04 PROCEDURE — 87210 SMEAR WET MOUNT SALINE/INK: CPT

## 2023-10-04 RX ORDER — METRONIDAZOLE 500 MG/1
500 TABLET ORAL 2 TIMES DAILY
Qty: 14 TABLET | Refills: 0 | Status: SHIPPED | OUTPATIENT
Start: 2023-10-04 | End: 2023-10-11

## 2023-11-17 ENCOUNTER — OFFICE VISIT (OUTPATIENT)
Dept: FAMILY MEDICINE | Facility: CLINIC | Age: 28
End: 2023-11-17
Payer: COMMERCIAL

## 2023-11-17 VITALS
BODY MASS INDEX: 42.41 KG/M2 | HEART RATE: 79 BPM | HEIGHT: 67 IN | TEMPERATURE: 98.7 F | OXYGEN SATURATION: 98 % | SYSTOLIC BLOOD PRESSURE: 116 MMHG | DIASTOLIC BLOOD PRESSURE: 70 MMHG | WEIGHT: 270.2 LBS | RESPIRATION RATE: 16 BRPM

## 2023-11-17 DIAGNOSIS — Z32.01 PREGNANCY TEST POSITIVE: Primary | ICD-10-CM

## 2023-11-17 LAB — HCG UR QL: POSITIVE

## 2023-11-17 PROCEDURE — 99213 OFFICE O/P EST LOW 20 MIN: CPT | Performed by: NURSE PRACTITIONER

## 2023-11-17 PROCEDURE — 81025 URINE PREGNANCY TEST: CPT | Performed by: NURSE PRACTITIONER

## 2023-11-17 RX ORDER — PRENATAL VIT/IRON FUM/FOLIC AC 27MG-0.8MG
1 TABLET ORAL DAILY
Qty: 90 TABLET | Refills: 3 | Status: SHIPPED | OUTPATIENT
Start: 2023-11-17

## 2023-11-17 ASSESSMENT — PAIN SCALES - GENERAL: PAINLEVEL: NO PAIN (0)

## 2023-11-17 NOTE — PROGRESS NOTES
"  Assessment & Plan     Pregnancy test positive    - HCG qualitative urine  - Start Prenatal Vit-Fe Fumarate-FA (PRENATAL MULTIVITAMIN W/IRON) 27-0.8 MG tablet; Take 1 tablet by mouth daily  - Ob/Gyn  Referral for prenatal care; Future    Pregnancy was unplanned and she is unsure of how she will plan to proceed.  We discussed her options.  We did go ahead and put in an OB/GYN referral as she did want this in place.  Recommend stopping the Saxenda, phentermine, and naltrexone prescriptions.  Recommend starting prenatal vitamin.  All questions answered.             BMI:   Estimated body mass index is 42.32 kg/m  as calculated from the following:    Height as of this encounter: 1.702 m (5' 7\").    Weight as of this encounter: 122.6 kg (270 lb 3.2 oz).       A total of 25 minutes were spent face-to-face with the patient during this encounter and over half of that time was spend on counseling and coordination of care.  We discussed in depth the nature of problem(s) listed above (see diagnoses listed above), course, prior treatments, and therapeutic options.  I also educated the patient about lifestyle modifications which may improve the problem.    Sasha Castillo NP  [facility]    Snow Og is a 27 year old, presenting for the following health issues:  pregnancy test  (LMP - 10/17/2023)        11/17/2023     1:35 PM   Additional Questions   Roomed by Michelle SERVIN       History of Present Illness       Reason for visit:  Pregnancy test    She eats 2-3 servings of fruits and vegetables daily.She consumes 1 sweetened beverage(s) daily.She exercises with enough effort to increase her heart rate 9 or less minutes per day.  She exercises with enough effort to increase her heart rate 3 or less days per week. She is missing 1 dose(s) of medications per week.  She is not taking prescribed medications regularly due to remembering to take and other.      Concern - pregnancy test  Onset: " "LMP 10/17/2023   Description: missed period   Intensity:  n/a   Progression of Symptoms:  none  Accompanying Signs & Symptoms: none     Did a home test, was very faint positive.  Then the next day had a negative test.    Additional provider notes:    This pregnancy was not planned.  Had the patch contraceptive birth control from [facility] but she stopped because of side effects.  She was then given oral contraceptive pill but never started it.    She did feel dizzy when in the shower, this self resolved.    Review of Systems   Constitutional, HEENT, cardiovascular, pulmonary, gi and gu systems are negative, except as otherwise noted.      Objective    /70 (BP Location: Right arm, Patient Position: Sitting, Cuff Size: Adult Large)   Pulse 79   Temp 98.7  F (37.1  C) (Oral)   Resp 16   Ht 1.702 m (5' 7\")   Wt 122.6 kg (270 lb 3.2 oz)   LMP 10/17/2023   SpO2 98%   BMI 42.32 kg/m    Body mass index is 42.32 kg/m .  Physical Exam   GENERAL: healthy, alert and no distress  CV: regular rates and rhythm, normal S1 S2, no S3 or S4, and no murmur, click or rub  PSYCH: mentation appears normal, affect normal/bright    Results for orders placed or performed in visit on 11/17/23 (from the past 24 hour(s))   HCG qualitative urine   Result Value Ref Range    hCG Urine Qualitative Positive (A) Negative                     "

## 2023-12-18 ENCOUNTER — MYC MEDICAL ADVICE (OUTPATIENT)
Dept: OBGYN | Facility: CLINIC | Age: 28
End: 2023-12-18
Payer: COMMERCIAL

## 2023-12-27 ENCOUNTER — MYC MEDICAL ADVICE (OUTPATIENT)
Dept: OBGYN | Facility: CLINIC | Age: 28
End: 2023-12-27
Payer: COMMERCIAL

## 2024-01-07 LAB
ABO/RH(D): NORMAL
ANTIBODY SCREEN: NEGATIVE
SPECIMEN EXPIRATION DATE: NORMAL

## 2024-01-08 ENCOUNTER — PRENATAL OFFICE VISIT (OUTPATIENT)
Dept: OBGYN | Facility: CLINIC | Age: 29
End: 2024-01-08
Attending: ADVANCED PRACTICE MIDWIFE
Payer: COMMERCIAL

## 2024-01-08 ENCOUNTER — LAB (OUTPATIENT)
Dept: LAB | Facility: CLINIC | Age: 29
End: 2024-01-08
Attending: ADVANCED PRACTICE MIDWIFE
Payer: COMMERCIAL

## 2024-01-08 ENCOUNTER — ANCILLARY PROCEDURE (OUTPATIENT)
Dept: ULTRASOUND IMAGING | Facility: CLINIC | Age: 29
End: 2024-01-08
Attending: ADVANCED PRACTICE MIDWIFE
Payer: COMMERCIAL

## 2024-01-08 VITALS
DIASTOLIC BLOOD PRESSURE: 75 MMHG | SYSTOLIC BLOOD PRESSURE: 109 MMHG | HEIGHT: 67 IN | BODY MASS INDEX: 43.47 KG/M2 | HEART RATE: 76 BPM | WEIGHT: 277 LBS

## 2024-01-08 DIAGNOSIS — Z12.4 SCREENING FOR MALIGNANT NEOPLASM OF CERVIX: ICD-10-CM

## 2024-01-08 DIAGNOSIS — Z32.01 PREGNANCY TEST POSITIVE: ICD-10-CM

## 2024-01-08 DIAGNOSIS — O09.90 HRP (HIGH RISK PREGNANCY), UNSPECIFIED TRIMESTER: ICD-10-CM

## 2024-01-08 DIAGNOSIS — E55.9 VITAMIN D DEFICIENCY: ICD-10-CM

## 2024-01-08 DIAGNOSIS — E66.813 CLASS 3 SEVERE OBESITY DUE TO EXCESS CALORIES WITH SERIOUS COMORBIDITY AND BODY MASS INDEX (BMI) OF 40.0 TO 44.9 IN ADULT (H): ICD-10-CM

## 2024-01-08 DIAGNOSIS — Z13.71 SCREENING FOR GENETIC DISEASE CARRIER STATUS: ICD-10-CM

## 2024-01-08 DIAGNOSIS — O09.90 HRP (HIGH RISK PREGNANCY), UNSPECIFIED TRIMESTER: Primary | ICD-10-CM

## 2024-01-08 DIAGNOSIS — Z36.89 SCREENING, ANTENATAL, FOR FETAL ANATOMIC SURVEY: ICD-10-CM

## 2024-01-08 DIAGNOSIS — E66.01 CLASS 3 SEVERE OBESITY DUE TO EXCESS CALORIES WITH SERIOUS COMORBIDITY AND BODY MASS INDEX (BMI) OF 40.0 TO 44.9 IN ADULT (H): ICD-10-CM

## 2024-01-08 LAB
BASOPHILS # BLD AUTO: 0 10E3/UL (ref 0–0.2)
BASOPHILS NFR BLD AUTO: 0 %
EOSINOPHIL # BLD AUTO: 0.1 10E3/UL (ref 0–0.7)
EOSINOPHIL NFR BLD AUTO: 1 %
ERYTHROCYTE [DISTWIDTH] IN BLOOD BY AUTOMATED COUNT: 13.1 % (ref 10–15)
HBA1C MFR BLD: 5.5 %
HCT VFR BLD AUTO: 38.6 % (ref 35–47)
HGB BLD-MCNC: 12.5 G/DL (ref 11.7–15.7)
IMM GRANULOCYTES # BLD: 0 10E3/UL
IMM GRANULOCYTES NFR BLD: 0 %
LYMPHOCYTES # BLD AUTO: 2.9 10E3/UL (ref 0.8–5.3)
LYMPHOCYTES NFR BLD AUTO: 31 %
MCH RBC QN AUTO: 27.9 PG (ref 26.5–33)
MCHC RBC AUTO-ENTMCNC: 32.4 G/DL (ref 31.5–36.5)
MCV RBC AUTO: 86 FL (ref 78–100)
MONOCYTES # BLD AUTO: 0.4 10E3/UL (ref 0–1.3)
MONOCYTES NFR BLD AUTO: 5 %
NEUTROPHILS # BLD AUTO: 5.8 10E3/UL (ref 1.6–8.3)
NEUTROPHILS NFR BLD AUTO: 63 %
NRBC # BLD AUTO: 0 10E3/UL
NRBC BLD AUTO-RTO: 0 /100
PLATELET # BLD AUTO: 270 10E3/UL (ref 150–450)
RBC # BLD AUTO: 4.48 10E6/UL (ref 3.8–5.2)
WBC # BLD AUTO: 9.1 10E3/UL (ref 4–11)

## 2024-01-08 PROCEDURE — 87491 CHLMYD TRACH DNA AMP PROBE: CPT | Performed by: ADVANCED PRACTICE MIDWIFE

## 2024-01-08 PROCEDURE — 83021 HEMOGLOBIN CHROMOTOGRAPHY: CPT

## 2024-01-08 PROCEDURE — 86787 VARICELLA-ZOSTER ANTIBODY: CPT

## 2024-01-08 PROCEDURE — 83036 HEMOGLOBIN GLYCOSYLATED A1C: CPT

## 2024-01-08 PROCEDURE — 87591 N.GONORRHOEAE DNA AMP PROB: CPT | Performed by: ADVANCED PRACTICE MIDWIFE

## 2024-01-08 PROCEDURE — 86762 RUBELLA ANTIBODY: CPT

## 2024-01-08 PROCEDURE — 99207 PR PRENATAL VISIT: CPT | Performed by: ADVANCED PRACTICE MIDWIFE

## 2024-01-08 PROCEDURE — 86706 HEP B SURFACE ANTIBODY: CPT

## 2024-01-08 PROCEDURE — 85025 COMPLETE CBC W/AUTO DIFF WBC: CPT

## 2024-01-08 PROCEDURE — 99213 OFFICE O/P EST LOW 20 MIN: CPT | Mod: 25 | Performed by: ADVANCED PRACTICE MIDWIFE

## 2024-01-08 PROCEDURE — G0145 SCR C/V CYTO,THINLAYER,RESCR: HCPCS | Performed by: ADVANCED PRACTICE MIDWIFE

## 2024-01-08 PROCEDURE — 36415 COLL VENOUS BLD VENIPUNCTURE: CPT

## 2024-01-08 PROCEDURE — 86900 BLOOD TYPING SEROLOGIC ABO: CPT

## 2024-01-08 PROCEDURE — 87389 HIV-1 AG W/HIV-1&-2 AB AG IA: CPT

## 2024-01-08 PROCEDURE — 87086 URINE CULTURE/COLONY COUNT: CPT

## 2024-01-08 PROCEDURE — 76801 OB US < 14 WKS SINGLE FETUS: CPT | Mod: 26 | Performed by: OBSTETRICS & GYNECOLOGY

## 2024-01-08 PROCEDURE — 76801 OB US < 14 WKS SINGLE FETUS: CPT

## 2024-01-08 PROCEDURE — 86803 HEPATITIS C AB TEST: CPT

## 2024-01-08 PROCEDURE — 82306 VITAMIN D 25 HYDROXY: CPT

## 2024-01-08 PROCEDURE — 86780 TREPONEMA PALLIDUM: CPT

## 2024-01-08 PROCEDURE — 87340 HEPATITIS B SURFACE AG IA: CPT

## 2024-01-08 ASSESSMENT — PAIN SCALES - GENERAL: PAINLEVEL: NO PAIN (0)

## 2024-01-08 NOTE — PROGRESS NOTES
S Provider New OB Note    Reviewed RN intake note.    Lenka is a 28 year old female who presents to clinic for a new OB visit.   at 11w6d with Estimated Date of Delivery: 2024 based on LMP. Feels well. Has started PNV.   She has not had bleeding since her LMP.   She has had mild nausea. Weight loss has not occurred.   This was not a planned pregnancy.   Partner is involved,  Tunde, supportive of pregnancy.     OTHER CONCERNS: would like pap smear and chlamydia and gonorrhea testing.      PSYCHIATRIC:  Denies mood changes.      PHQ-9 score:         No data to display                         No data to display                  Past History:  Her past medical history   Past Medical History:   Diagnosis Date    Class 3 severe obesity with serious comorbidity and body mass index (BMI) of 45.0 to 49.9 in adult (H) 2023   .   This is her first pregnancy  Since her last LMP she denies use of alcohol, tobacco and street drugs.  HISTORY:  Family History   Problem Relation Age of Onset    Diabetes Mother     Diabetes Father     No Known Problems Maternal Grandmother     No Known Problems Maternal Grandfather     No Known Problems Paternal Grandmother     No Known Problems Paternal Grandfather     No Known Problems Brother     No Known Problems Brother     No Known Problems Sister     No Known Problems Sister     No Known Problems Sister      Social History     Socioeconomic History    Marital status: Single     Spouse name: Tunde    Number of children: None    Years of education: None    Highest education level: None   Occupational History    Occupation: Health Unit coordinator   Tobacco Use    Smoking status: Never     Passive exposure: Never    Smokeless tobacco: Never   Vaping Use    Vaping Use: Never used   Substance and Sexual Activity    Alcohol use: Not Currently    Drug use: Not Currently    Sexual activity: Yes     Partners: Male     Social Determinants of Health     Financial Resource Strain: Low  Risk  (11/17/2023)    Financial Resource Strain     Within the past 12 months, have you or your family members you live with been unable to get utilities (heat, electricity) when it was really needed?: No   Food Insecurity: Low Risk  (11/17/2023)    Food Insecurity     Within the past 12 months, did you worry that your food would run out before you got money to buy more?: No     Within the past 12 months, did the food you bought just not last and you didn t have money to get more?: No   Transportation Needs: Low Risk  (11/17/2023)    Transportation Needs     Within the past 12 months, has lack of transportation kept you from medical appointments, getting your medicines, non-medical meetings or appointments, work, or from getting things that you need?: No   Interpersonal Safety: Low Risk  (11/17/2023)    Interpersonal Safety     Do you feel physically and emotionally safe where you currently live?: Yes     Within the past 12 months, have you been hit, slapped, kicked or otherwise physically hurt by someone?: No     Within the past 12 months, have you been humiliated or emotionally abused in other ways by your partner or ex-partner?: No   Housing Stability: Low Risk  (11/17/2023)    Housing Stability     Do you have housing? : Yes     Are you worried about losing your housing?: No     Current Outpatient Medications   Medication Sig    acetaminophen (TYLENOL) 500 MG tablet Take 500-1,000 mg by mouth every 6 hours as needed for mild pain    Prenatal Vit-Fe Fumarate-FA (PRENATAL MULTIVITAMIN W/IRON) 27-0.8 MG tablet Take 1 tablet by mouth daily     No current facility-administered medications for this visit.     Allergies   Allergen Reactions    Clindamycin Hives and Rash       ============================================  MEDICAL HISTORY  Past Medical History:   Diagnosis Date    Class 3 severe obesity with serious comorbidity and body mass index (BMI) of 45.0 to 49.9 in adult (H) 02/21/2023     History reviewed. No  "pertinent surgical history.    OB History    Para Term  AB Living   1 0 0 0 0 0   SAB IAB Ectopic Multiple Live Births   0 0 0 0 0      # Outcome Date GA Lbr Rex/2nd Weight Sex Delivery Anes PTL Lv   1 Current               Obstetric Comments   First pregnancy       Reviewed genetic history form       GYN History- Denies Abnormal Pap Smears                        Cervical procedures: no                        History of STI: denies    I personally reviewed the past social/family/medical and surgical history on the date of service.     OBJECTIVE:  /75   Pulse 76   Ht 1.7 m (5' 6.93\")   Wt 125.6 kg (277 lb)   LMP 10/17/2023   BMI 43.48 kg/m    ROS: 10 point ROS neg other than the symptoms noted above in the HPI.    EXAM:  /75   Pulse 76   Ht 1.7 m (5' 6.93\")   Wt 125.6 kg (277 lb)   LMP 10/17/2023   BMI 43.48 kg/m     EXAM:  GENERAL:  Pleasant pregnant female, alert, cooperative  and well groomed.  SKIN:  Warm and dry, without lesions or rashes  HEAD: Symmetrical features.  MOUTH:  Buccal mucosa pink, moist without lesions.  Teeth in good repair.    NECK:  Thyroid without enlargement and nodules.  Lymph nodes not palpable.   LUNGS:  Clear to auscultation.  BREAST:    No dominant, fixed or suspicious masses are noted.  No skin or nipple changes or axillary nodes.   Nipples everted, bilateral piercing present.      HEART:  RRR without murmur.  ABDOMEN: Soft without masses , tenderness or organomegaly.  No CVA tenderness.  Uterus not palpable at size equal to dates.  No scars noted..   MUSCULOSKELETAL:  Full range of motion  EXTREMITIES:  No edema. No significant varicosities.   PELVIC EXAM:  GENITALIA: EGBUS  External genitalia, Bartholin's glands, urethra & Pisinemo's glands:normal. Vulva reveals no erythema or lesions.         VAGINA:  pink, normal rugae and discharge, no lesions, good tone.   CERVIX:  without lesions, pap smear and gc/ct samples obtained               Recommended Flu " Vaccine.  Patient already received Flu Vaccine      ASSESSMENT:  28 year old , 11w6d weeks of pregnancy with MICHEAL of 2024 by LMP  Intrauterine pregnancy 11w6d size consistent with dates  Genetic Screening: First Trimester Screen    ICD-10-CM    1. HRP (high risk pregnancy), unspecified trimester  O09.90 ABO/Rh Type and Screen     CBC with Platelets Differential     Chlamydia by PCR     Gonorrhorea by PCR     Hepatitis B Surface Antigen     HIV Antigen Antibody Combo     Rubella Antibody IgG     Treponema Abs w Reflex to RPR and Titer     Urine Culture Aerobic Bacterial     Hemoglobin A1c     Hepatitis C antibody     Varicella Zoster Virus Antibody IgG     Hepatitis B Surface Antibody      2. Pregnancy test positive  Z32.01 Ob/Gyn  Referral      3. Screening for malignant neoplasm of cervix  Z12.4 Obtaining, preparing and conveyance of cervical or vaginal smear to laboratory.     Pap thin layer screen reflex to HPV if ASCUS - recommended age 25 - 29 years      4. Vitamin D deficiency  E55.9 25- OH-Vitamin D          Lenka was seen today for prenatal care.    Diagnoses and all orders for this visit:    HRP (high risk pregnancy), unspecified trimester  -     ABO/Rh Type and Screen; Future  -     CBC with Platelets Differential; Future  -     Chlamydia by PCR  -     Gonorrhorea by PCR  -     Hepatitis B Surface Antigen; Future  -     HIV Antigen Antibody Combo; Future  -     Rubella Antibody IgG; Future  -     Treponema Abs w Reflex to RPR and Titer; Future  -     Urine Culture Aerobic Bacterial; Future  -     Hemoglobin A1c; Future  -     Hepatitis C antibody; Future  -     Varicella Zoster Virus Antibody IgG; Future  -     Hepatitis B Surface Antibody; Future    Pregnancy test positive  -     Ob/Gyn  Referral    Screening for malignant neoplasm of cervix  -     Obtaining, preparing and conveyance of cervical or vaginal smear to laboratory.  -     Pap thin layer screen reflex to HPV if  ASCUS - recommended age 25 - 29 years    Vitamin D deficiency  -     25- OH-Vitamin D; Future          Orders Placed This Encounter   Procedures    Obtaining, preparing and conveyance of cervical or vaginal smear to laboratory.    25- OH-Vitamin D    Hepatitis B Surface Antigen    HIV Antigen Antibody Combo    Rubella Antibody IgG    Treponema Abs w Reflex to RPR and Titer    Hemoglobin A1c    Hepatitis C antibody    Varicella Zoster Virus Antibody IgG    Hepatitis B Surface Antibody    Pap thin layer screen reflex to HPV if ASCUS - recommended age 25 - 29 years    Chlamydia by PCR    Gonorrhorea by PCR    Urine Culture Aerobic Bacterial    ABO/Rh Type and Screen    CBC with Platelets Differential        PLAN:  - Reviewed use of triage nurse line and contacting the on-call provider after hours for an urgent need such as fever, vagina bleeding, bladder or vaginal infection, rupture of membranes,  or term labor.    -Ordered routine prenatal lab work.     - Reviewed best evidence for: weight gain for her weight and height for pregnancy:  Based on pre-pregnancy Body mass index is 43.48 kg/m . RECOMMENDED WEIGHT GAIN: < 15 lbs.    If pre pregnancy BMI>/= 30, BMI entered on problem list (including patient's preferred way to reference obesity during prenatal care) with plan for possible referrals and  testing  BMI > 35 start BPPs at 37 weeks  BMI > 40 start BPPs at 34 weeks      - Reviewed healthy diet and foods to avoid, exercise and activity during pregnancy; avoiding exposure to toxoplasmosis; and maintenance of a generally healthy lifestyle.   - Discussed the harms, benefits, side effects and alternative therapies for current prescribed and OTC medications. Patient was encouraged to start/continue prenatal vitamins as tolerated.    - Oriented to Practice, types of care, and how to reach a provider.  Pt prefers Undecided between CNM and MD, will continue to consider.     - Patient received 1st trimester  new OB education packet complete with aide of [employer] Expectant Family booklet including information on genetic screening test options.  - Patient desires 1st trimester screening and desires level II ultrasound which were ordered.    - Reviewed risk for gestational diabetes d/t Pre pregnancy BMI>30 and First degree relative with GDM or DM , IS agreeable to early 1 hour glucose, unable to complete today.    - Reviewed risk for Pre Eclampsia meets two or more of the moderate risk factors including Nulliparity  and Pre Pregnancy body mass index >30  and ISagreeable to starting 81mg aspirin daily after 12 weeks .    - The patient  does not have a history of spontaneous  birth so  WILL NOT consider serial transvaginal cervical length ultrasounds from 16-24 weeks.     -The patient does not have a history of immunosuppresion or HIV so Toxoplasma IgG/IgM WILL NOT be ordered.    -Assess risk for asymptomatic latent TB (prior infection, recent immigrant from epidemic areas, immunosuppression, living in overcrowded environment):   WILL NOT have PPD skin test or Quantiferon-TB Gold Plus blood draw. *both options valid*      - All pt's questions discussed and answered.  Pt verbalized understanding of and agreement to plan of care.     - Continue scheduled prenatal care and prn if questions or concerns    ERIC Sims CNM

## 2024-01-09 ENCOUNTER — TRANSCRIBE ORDERS (OUTPATIENT)
Dept: MATERNAL FETAL MEDICINE | Facility: CLINIC | Age: 29
End: 2024-01-09
Payer: COMMERCIAL

## 2024-01-09 DIAGNOSIS — O26.90 PREGNANCY RELATED CONDITION, ANTEPARTUM: Primary | ICD-10-CM

## 2024-01-09 PROBLEM — E55.9 VITAMIN D DEFICIENCY: Status: ACTIVE | Noted: 2024-01-09

## 2024-01-09 PROBLEM — O09.899 MATERNAL VARICELLA, NON-IMMUNE: Status: ACTIVE | Noted: 2024-01-09

## 2024-01-09 PROBLEM — Z23 NEED FOR IMMUNIZATION AGAINST RUBELLA: Status: ACTIVE | Noted: 2024-01-09

## 2024-01-09 PROBLEM — Z28.39 MATERNAL VARICELLA, NON-IMMUNE: Status: ACTIVE | Noted: 2024-01-09

## 2024-01-09 LAB
BACTERIA UR CULT: NO GROWTH
C TRACH DNA SPEC QL NAA+PROBE: NEGATIVE
HBV SURFACE AB SERPL IA-ACNC: 366 M[IU]/ML
HBV SURFACE AB SERPL IA-ACNC: REACTIVE M[IU]/ML
HBV SURFACE AG SERPL QL IA: NONREACTIVE
HCV AB SERPL QL IA: NONREACTIVE
HIV 1+2 AB+HIV1 P24 AG SERPL QL IA: NONREACTIVE
N GONORRHOEA DNA SPEC QL NAA+PROBE: NEGATIVE
RUBV IGG SERPL QL IA: 0.76 INDEX
RUBV IGG SERPL QL IA: NORMAL
T PALLIDUM AB SER QL: NONREACTIVE
VIT D+METAB SERPL-MCNC: 23 NG/ML (ref 20–50)
VZV IGG SER QL IA: 90.3 INDEX
VZV IGG SER QL IA: NORMAL

## 2024-01-10 LAB
BKR LAB AP GYN ADEQUACY: NORMAL
BKR LAB AP GYN INTERPRETATION: NORMAL
BKR LAB AP HPV REFLEX: NORMAL
BKR LAB AP LMP: NORMAL
BKR LAB AP PREVIOUS ABNORMAL: NORMAL
HGB A1 MFR BLD: 96.6 %
HGB A2 MFR BLD: 3.1 %
HGB C MFR BLD: 0 %
HGB E MFR BLD: 0 %
HGB F MFR BLD: 0.3 %
HGB FRACT BLD ELPH-IMP: NORMAL
HGB OTHER MFR BLD: 0 %
HGB S BLD QL SOLY: NORMAL
HGB S MFR BLD: 0 %
PATH INTERP BLD-IMP: NORMAL
PATH REPORT.COMMENTS IMP SPEC: NORMAL
PATH REPORT.COMMENTS IMP SPEC: NORMAL
PATH REPORT.RELEVANT HX SPEC: NORMAL

## 2024-01-15 ENCOUNTER — LAB (OUTPATIENT)
Dept: LAB | Facility: CLINIC | Age: 29
End: 2024-01-15
Payer: COMMERCIAL

## 2024-01-15 ENCOUNTER — MYC MEDICAL ADVICE (OUTPATIENT)
Dept: OBGYN | Facility: CLINIC | Age: 29
End: 2024-01-15

## 2024-01-15 ENCOUNTER — PRE VISIT (OUTPATIENT)
Dept: MATERNAL FETAL MEDICINE | Facility: HOSPITAL | Age: 29
End: 2024-01-15

## 2024-01-15 DIAGNOSIS — E66.01 CLASS 3 SEVERE OBESITY DUE TO EXCESS CALORIES WITH SERIOUS COMORBIDITY AND BODY MASS INDEX (BMI) OF 40.0 TO 44.9 IN ADULT (H): ICD-10-CM

## 2024-01-15 DIAGNOSIS — E66.813 CLASS 3 SEVERE OBESITY DUE TO EXCESS CALORIES WITH SERIOUS COMORBIDITY AND BODY MASS INDEX (BMI) OF 40.0 TO 44.9 IN ADULT (H): ICD-10-CM

## 2024-01-15 DIAGNOSIS — O09.90 HRP (HIGH RISK PREGNANCY), UNSPECIFIED TRIMESTER: ICD-10-CM

## 2024-01-15 LAB — GLUCOSE 1H P 50 G GLC PO SERPL-MCNC: 114 MG/DL (ref 70–129)

## 2024-01-15 PROCEDURE — 36415 COLL VENOUS BLD VENIPUNCTURE: CPT

## 2024-01-15 PROCEDURE — 82950 GLUCOSE TEST: CPT

## 2024-01-18 ENCOUNTER — MEDICAL CORRESPONDENCE (OUTPATIENT)
Dept: HEALTH INFORMATION MANAGEMENT | Facility: CLINIC | Age: 29
End: 2024-01-18

## 2024-01-18 ENCOUNTER — ANCILLARY PROCEDURE (OUTPATIENT)
Dept: ULTRASOUND IMAGING | Facility: HOSPITAL | Age: 29
End: 2024-01-18
Attending: OBSTETRICS & GYNECOLOGY
Payer: COMMERCIAL

## 2024-01-18 ENCOUNTER — OFFICE VISIT (OUTPATIENT)
Dept: MATERNAL FETAL MEDICINE | Facility: HOSPITAL | Age: 29
End: 2024-01-18
Attending: OBSTETRICS & GYNECOLOGY
Payer: COMMERCIAL

## 2024-01-18 ENCOUNTER — LAB (OUTPATIENT)
Dept: LAB | Facility: HOSPITAL | Age: 29
End: 2024-01-18
Attending: OBSTETRICS & GYNECOLOGY
Payer: COMMERCIAL

## 2024-01-18 DIAGNOSIS — Z36.82 ENCOUNTER FOR ANTENATAL SCREENING FOR NUCHAL TRANSLUCENCY: Primary | ICD-10-CM

## 2024-01-18 DIAGNOSIS — Z34.01 SUPERVISION OF NORMAL FIRST PREGNANCY IN FIRST TRIMESTER: ICD-10-CM

## 2024-01-18 DIAGNOSIS — O26.90 PREGNANCY RELATED CONDITION, ANTEPARTUM: ICD-10-CM

## 2024-01-18 DIAGNOSIS — O26.90 PREGNANCY RELATED CONDITION, ANTEPARTUM: Primary | ICD-10-CM

## 2024-01-18 DIAGNOSIS — Z31.5 ENCOUNTER FOR PROCREATIVE GENETIC COUNSELING: ICD-10-CM

## 2024-01-18 PROCEDURE — 96040 HC GENETIC COUNSELING, EACH 30 MINUTES: CPT

## 2024-01-18 PROCEDURE — 99207 PR NO CHARGE LOS: CPT | Performed by: OBSTETRICS & GYNECOLOGY

## 2024-01-18 PROCEDURE — 36415 COLL VENOUS BLD VENIPUNCTURE: CPT

## 2024-01-18 PROCEDURE — 76813 OB US NUCHAL MEAS 1 GEST: CPT | Mod: 26 | Performed by: OBSTETRICS & GYNECOLOGY

## 2024-01-18 PROCEDURE — 76813 OB US NUCHAL MEAS 1 GEST: CPT

## 2024-01-18 NOTE — NURSING NOTE
Lenka Adams is a  at 13w2d who presents to [facility] for scheduled genetic counseling and nuchal translucency. SBAR given to Dr. Auguste, see note in Epic.

## 2024-01-18 NOTE — PROGRESS NOTES
Please see the imaging tab for details of the ultrasound performed today.    Monse Auguste MD  Specialist in Maternal-Fetal Medicine

## 2024-01-18 NOTE — PROGRESS NOTES
[facility]  Genetic Counseling Consult    Patient:  Lenka Adams YOB: 1995   Date of Service:  1/18/24   MRN: [id number]    Lenka was seen at the [facility] for genetic consultation. The indication for genetic counseling is desire to discuss options for genetic screening and diagnostics. The patient was unaccompanied to this visit.     The session was conducted in English.      IMPRESSION/ PLAN   1. Lenka has not had genetic screening in this pregnancy but elected to have screening today.     2. During today's [facility] visit, Lenka had a blood draw for expanded non-invasive prenatal testing (also called NIPT, NIPS, or cell-free DNA) through [facility]. The expanded NIPT screens for trisomy 21, 18, and 13 and 22q11.2 deletion syndrome. The patient opted to screen for sex chromosome aneuploidies, including reported fetal sex.  In accordance with current guidelines, other microdeletion syndromes and rare autosomal trisomies were not included, but reflex to include these conditions remains available with expanded NIPT if there is an indication later in the pregnancy. Results are expected in 7-14 days. The patient will be called with results and if they do not answer they requested a detailed message with results on their voicemail, including the predicted fetal sex information.  Patient was informed that results will be available in [employer].     3. Carrier screening was discussed in detail today. Lenka is interested in expanded carrier screening through [facility] (558 gene panel) but would like to first discuss it further with her partner, Tunde. Lenka received complete counseling on carrier screening and signed a consent form for the screening. She will discuss with Tunde and let me know if she would like to move forward, in which case I can place an order. Tunde can also reach out to me to coordinate his  "own screening, if desired. Lenka signed an authorization to communicate form so that her carrier screening could be discussed with Tunde.    4. Lenka had a nuchal translucency ultrasound today. Please see the ultrasound report for further details.    5. Further recommendations include a fetal anatomy level II ultrasound with MFM. The upcoming ultrasound has been scheduled for 2024.    PREGNANCY HISTORY   /Parity:     Current Age: 28 year old   Age at Delivery: 28 year old  MICHEAL: 2024, by Last Menstrual Period                                   Gestational Age: 13w2d  This pregnancy is a single gestation.     MEDICAL HISTORY   Per the medical record, Lekna's medical history includes vitamin D deficiency. For a complete review of her diagnoses and medical history, please refer to the medical record.       FAMILY HISTORY   A three-generation pedigree was obtained today and is scanned under the \"Media\" tab in Epic. The family history was reported by Lenka.    The reported family history is unremarkable for multiple miscarriages, stillbirths, infant deaths, birth defects, intellectual disabilities, developmental delays, autism, known genetic conditions, cancer under the age of 50, and consanguinity.        RISK ASSESSMENT FOR CHROMOSOME CONDITIONS   We explained that the risk for fetal chromosome abnormalities increases with maternal age. We discussed specific features of common chromosome abnormalities, including Down syndrome, trisomy 13, trisomy 18, and sex chromosome trisomies.    At age 28 at midtrimester, the risk to have a baby with Down syndrome is 1 in 928.   At age 28 at midtrimester, the risk to have a baby with any chromosome abnormality is 1 in 464.     Lenka has not had genetic screening in this pregnancy but elected to have screening today.      GENETIC TESTING OPTIONS FOR CHROMOSOMAL CONDITIONS   Genetic testing during a pregnancy includes screening and diagnostic " procedures.      Screening tests are non-invasive which means no risk to the pregnancy and includes ultrasounds and blood work. The benefits and limitations of screening were reviewed. Screening tests provide a risk assessment (chance) specific to the pregnancy for certain fetal chromosome abnormalities but cannot definitively diagnose or exclude a fetal chromosome abnormality. Follow-up genetic counseling and consideration of diagnostic testing is recommended with any abnormal screening result. Diagnostic testing during a pregnancy is more certain and can test for more conditions. However, the tests do have a risk of miscarriage that requires careful consideration. These tests can detect fetal chromosome abnormalities with greater than 99% certainty. Results can be compromised by maternal cell contamination or mosaicism and are limited by the resolution of current genetic testing technology.     There is no screening or diagnostic test that detects all forms of birth defects or intellectual disability.     We discussed the following screening options:   Non-invasive prenatal testing (NIPT)  Also called cell-free DNA screening because it detects chromosomes from the placenta in the pregnant person's blood  Can be done any time after 10 weeks gestation  Screens for trisomy 21, trisomy 18, trisomy 13, and sex chromosome aneuploidies  Cannot screen for open neural tube defects, maternal serum AFP after 15 weeks is recommended  We also discussed that current ACMG guidelines recommend that screening for 22q11.2 deletion syndrome be offered to all pregnant patients. 22q11.2 deletion syndrome has an estimated prevalence of 1 in 990 to 1 in 2148 (0.05-0.1%). Risk is not thought to increase with maternal age. Clinical features are variable but include congenital heart defects, cleft palate, developmental delays, immune system deficiencies, and hearing loss. Approximately 90% of cases are de jessa (a sporadic new change in a  pregnancy). Cell-free DNA screening for 22q11.2 deletion syndrome is available (Expanded NIPS through [employer]). We discussed the limitations of cell-free DNA screening in detecting microdeletions and the possiblity of false positives and false negatives. Expanded NIPS also allows for reflex to include other microdeletion conditions and rare autosomal trisomies if an indication would arise later in the pregnancy.      We discussed the following ultrasound options:  Nuchal translucency (NT) ultrasound  Ultrasound between 53m1k-13b0g that includes nuchal translucency measurement and nasal bone assessments  Nuchal translucency refers to the space at the back of the neck where fluid builds up. All babies at this stage have fluid and there is only concern if there is too much fluid  Nasal bone refers to the small bone in the nose. There is concern for conditions like Down syndrome if the bone cannot be seen at all  This ultrasound can be done as part of first trimester screening, at the same time as another screen (NIPT), at the same time as a CVS, or if the patients does not want genetic screening.  Markers on ultrasound detects about 70% of pregnancies with aneuploidy  Abnormalities on NT ultrasound can also increase the risk for a birth defect, like a heart defect  Comprehensive level II ultrasound (Fetal Anatomy Ultrasound)  Ultrasound done between 18-20 weeks gestation  Screens for major birth defects and markers for aneuploidy (like trisomy 21 and trisomy 18)  Includes looking at the fetus/baby's growth, heart, organs (stomach, kidneys), placenta, and amniotic fluid    We discussed the following diagnostic options:   Chorionic villus sampling (CVS)  Invasive diagnostic procedure done between 10w0d and 13w6d  The procedure collects a small sample from the placenta for the purpose of chromosomal testing and/or other genetic testing  Diagnostic result; more than 99% sensitivity for fetal chromosome  abnormalities  Cannot screen for open neural tube defects, maternal serum AFP after 15 weeks is recommended  Amniocentesis  Invasive diagnostic procedure done after 15 weeks gestation  The procedure collects a small sample of amniotic fluid for the purpose of chromosomal testing and/or other genetic testing  Diagnostic result; more than 99% sensitivity for fetal chromosome abnormalities  Testing for AFP in the amniotic fluid can test for open neural tube defects    CARRIER SCREENING   Expanded carrier screening is available to screen for autosomal recessive conditions and X-linked conditions in a large list of genes. Autosomal recessive conditions happen when a mutation has been inherited from the egg and sperm and include conditions like cystic fibrosis, thalassemia, hearing loss, spinal muscular atrophy, and more. X-linked conditions happen when a mutation has been inherited from the egg and include conditions like fragile X syndrome.  screening was also reviewed. About [place]  Screening    Carrier screening was discussed in detail today. Lenka is interested in expanded carrier screening through [facility] (558 gene panel) but would like to first discuss it further with her partner, Tunde. Lenka received complete counseling on carrier screening and signed a consent form for the screening. She will discuss with Tunde and let me know if she would like to move forward, in which case I can place an order. Tunde can also reach out to me to coordinate his own screening, if desired. Lenka signed an authorization to communicate form so that her carrier screening could be discussed with Tunde.    We discussed the following:   Carrier screening does not test the pregnancy but gives a risk assessment for the pregnancy and future pregnancies to have the condition  There are different size panels or list of conditions for carrier screening.  Some conditions cause health problems for carriers  Carrier screening does  not test for all genetic and health conditions or risk factors  There are limitations to current technology and results may be updated at a later date  The results typically take 2-3 weeks. They will be available in EPIC and routed to the referring OB provider. The patient can view them in [facility] and the lab's patient portal.  If an individual is a carrier, family members could be as well. The patient is encouraged to share this information with relatives.  The lab will communicate the out-of-pocket cost with the patient and will also provide an option to switch to self-pay. The default is billing through insurance, and it is the patient's responsibility to respond to the communication if they would like to switch to self-pay.  We reviewed that there is a law in place, the Genetic Information Nondiscrimination Act (OLI), that protects patients from discrimination by health insurance companies and employers based on their genetic information. OLI does not protect against discrimination by life insurance companies or disability insurance.        It was a pleasure to be involved with Lenka hill care. Face-to-face time of the meeting was 45 minutes.    Santy Anaya MS, [facility]  Board Certified and [place] Licensed Genetic Counselor  [facility]  Maternal Fetal Medicine  Office: [phone]  [facility]: [phone]   Fax: [phone]  [facility]

## 2024-01-25 ENCOUNTER — TELEPHONE (OUTPATIENT)
Dept: MATERNAL FETAL MEDICINE | Facility: HOSPITAL | Age: 29
End: 2024-01-25
Payer: COMMERCIAL

## 2024-01-25 LAB — SCANNED LAB RESULT: NORMAL

## 2024-01-25 NOTE — TELEPHONE ENCOUNTER
January 25, 2024    Left a message for Lenka regarding her NIPT results.     Results indicate NO ANEUPLOIDY DETECTED for chromosomes 21, 18, 13, or sex chromosomes (XY). Results also indicate NO MICRODELETION DETECTED for the 22q11.21 region.    This puts her current pregnancy at low risk for Down syndrome, trisomy 18, trisomy 13 and sex chromosome abnormalities. This test is reported to have the following sensitivities: Down syndrome: 99.99%, trisomy 18: 99.99%, and trisomy 13: 99.99%. Although these results are reassuring, this does not replace a standard chromosome analysis from a chorionic villus sampling or amniocentesis. The results also reduce, but do not eliminate, the risk for 22q11.2 deletion syndrome.    MSAFP is the appropriate second trimester screening test for open neural tube defects; the maternal quad screen is not recommended.    Her results are available in her Epic chart for her primary OB to review.       Santy Anaya MS, [facility]  Board Certified and [place] Licensed Genetic Counselor  [facility]  Maternal Fetal Medicine  Office: [phone]  [facility]: [phone]   Fax: [phone]  [employer]

## 2024-01-30 ENCOUNTER — MYC MEDICAL ADVICE (OUTPATIENT)
Dept: OBGYN | Facility: CLINIC | Age: 29
End: 2024-01-30
Payer: COMMERCIAL

## 2024-02-01 NOTE — PATIENT INSTRUCTIONS
Thank you for trusting us with your care!     If you need to contact us for questions about:  Symptoms, Scheduling & Medical Questions; Non-urgent (2-3 day response) Carifred message, Urgent (needing response today) [phone] (if after 3:30pm next day response)   Prescriptions: Please call your Pharmacy   Billing: [facility] [phone] or [facility]:[phone]    Weeks 14 to 18 of Your Pregnancy: Care Instructions  Around this time, you may start to look pregnant. Your baby is now able to pass urine. And the first stool (meconium) is starting to collect in your baby's intestines. Hair is starting to grow on your baby's head.    You may notice some skin changes, such as itchy spots on your palms or acne on your face.   At your next doctor visit, you may have an ultrasound. So you might think about whether you want to know the sex of your baby. Also ask your doctor about flu and COVID-19 shots.      How to reduce stress   Ask for help when you need it.  Try to avoid things that cause you stress.  Seek out things that relieve stress, such as breathing exercises or yoga.     How to get exercise   If you don't usually exercise, start slowly. Short walks may be a good choice.  Try to be active 30 minutes a day, at least 5 days a week.  Avoid activities where you're more likely to fall.  Use light weights to reduce stress on your joints.     How to stay at a healthy weight for you   Talk to your doctor or midwife about how much weight you should gain.  It's generally best to gain:  About 28 to 40 pounds if you're underweight.  About 25 to 35 pounds if you're at a healthy weight.  About 15 to 25 pounds if you're overweight.  About 11 to 20 pounds if you're very overweight (obese).  Follow-up care is a key part of your treatment and safety. Be sure to make and go to all appointments, and call your doctor if you are having problems. It's also a good idea to know your test results and keep a list of the medicines you  "take.  Where can you learn more?  Go to https://www.[employer].net/patiented  Enter I453 in the search box to learn more about \"Weeks 14 to 18 of Your Pregnancy: Care Instructions.\"  Current as of: July 11, 2023               Content Version: 13.8    1657-6048 [employer].   Care instructions adapted under license by your healthcare professional. If you have questions about a medical condition or this instruction, always ask your healthcare professional. [employer] disclaims any warranty or liability for your use of this information.      Second-Trimester Fetal Ultrasound: About This Test  What is it?     Fetal ultrasound is a test that uses sound waves to make pictures of your baby (fetus) and placenta inside the uterus. The test is the safest way to find out the age, size, and position of your baby. You also may be able to find out the sex of your baby. (But the test isn't done just to find out a baby's sex.)  No known risks to the mother or the baby are linked to fetal ultrasound. But you may feel anxious if the test reveals a problem with your pregnancy or baby.  Why is this test done?  In the second trimester, a fetal ultrasound is done to:  Estimate the number of weeks and days a fetus has developed since the beginning of the pregnancy. This is called the gestational age.  Look at the size and position of the fetus, the placenta, and the fluid that surrounds the fetus.  Find major birth defects, such as heart problems or problems with the brain and spinal cord (neural tube defects). But the test may not be able to find many minor defects and some major birth defects.  How do you prepare for the test?  In general, there's nothing you have to do before this test, unless your doctor tells you to.  How is the test done?  You may be able to leave your clothes on, or you will be given a gown to wear.  You will lie on your back on a padded examination table.  A gel will be spread on your " "belly. It will be removed after the test.  A small, handheld device called a transducer will be pressed against the gel on your skin and moved across your belly several times.  You may watch the monitor to see the picture of your baby during the test.  What happens after the test?  You will probably be able to go home right away.  You most likely will be able to go back to your usual activities right away.  Follow-up care is a key part of your treatment and safety. Be sure to make and go to all appointments, and call your doctor if you are having problems. It's also a good idea to keep a list of the medicines you take. Ask your doctor when you can expect to have your test results.  Where can you learn more?  Go to https://www.[employer].Grid20/20/patiented  Enter Y671 in the search box to learn more about \"Second-Trimester Fetal Ultrasound: About This Test.\"  Current as of: July 11, 2023               Content Version: 13.8    2141-4996 [employer].   Care instructions adapted under license by your healthcare professional. If you have questions about a medical condition or this instruction, always ask your healthcare professional. [employer] disclaims any warranty or liability for your use of this information.      During Pregnancy: Exercises  Introduction  Here are some examples of exercises to do during your pregnancy. Start each exercise slowly. Ease off the exercise if you start to have pain.  Talk to your doctor about when you can start these exercises and which ones will work best for you.  How to do the exercises  Neck rotation    Sit in a firm chair, or stand tall. If you're standing, keep your feet about hip-width apart.  Keeping your chin level, turn your head to the right and hold for 15 to 30 seconds.  Turn your head to the left and hold for 15 to 30 seconds.  Repeat 2 to 4 times.  Next stretch to the front    Sit in a firm chair, or stand tall. Look straight ahead. If you're " standing, keep your feet about hip-width apart.  Slowly bend your head forward without moving your shoulders.  Hold for 15 to 30 seconds, then return to your starting position.  Repeat 2 to 4 times.  Back press    Place your feet 10 to 12 inches from the wall.  Rest your back flat against the wall and slide down the wall until your knees are slightly bent.  Press your lower back against the wall by pulling in your stomach muscles.  Hold for 6 seconds, and then relax your stomach muscles and slide back up the wall.  Repeat 8 to 12 times.  Trunk twist (seated)    Sit on the floor with your legs crossed. If that's not comfortable, you can sit on a folded blanket so your buttocks are a few inches off the floor. Or you can sit on a chair with your knees comfortably spread apart and your feet flat on the floor.  Reach your left hand toward your right knee. You can place your right hand at your side for support.  Slowly twist your body (trunk) to your right.  Relax and return to your starting position.  Repeat 2 to 4 times.  Switch your hands and twist to your left.  Repeat 2 to 4 times.  Pelvic rocking    Kneeling on hands and knees, place your hands directly under your shoulders and your knees under your hips.  Breathe in deeply. Tuck your head downward and round your back up, making a curve with your back in the shape of the letter C. Hold this position for a count of 6.  Breathe out slowly and bring your head back up. Relax, keeping your back straight (don't allow it to curve toward the floor). Hold this for a count of 6.  Do this exercise 8 times or to your comfort level.  Pelvic tilt    This exercise strengthens your lower back and pelvis. It is for use during the first 4 months of pregnancy. After this point, lying on your back is not recommended, because it can cause blood flow problems for you and your baby.  Lie on your back.  Keep your knees relaxed.  Tighten your belly and buttocks muscles.  At the same time,  gently shift your pelvis upward. This should flatten the curve in your back.  Hold for 6 seconds and then relax.  Gradually increase the number of tilts you do each day, to your comfort level.  Backward stretch    Kneel on hands and knees with your knees 8 to 10 inches apart, hands directly under your shoulders, and arms and back straight.  Keeping your arms straight, slowly lower your buttocks toward your heels and tuck your head toward your knees. Hold for 15 to 30 seconds.  Slowly return to the kneeling position.  Repeat 2 to 4 times.  Forward bend    Sit comfortably in a chair, with your arms relaxed.  Slowly bend forward, allowing your arms to hang down in front of you. Lean only as far as you can without feeling discomfort or pressure on your belly.  Hold for 15 to 30 seconds and then slowly sit up straight.  Repeat 2 to 4 times or to your comfort level.  Leg lift crawl    Kneeling on hands and knees, place your hands directly under your shoulders and straighten your arms.  Tighten your belly muscles by pulling in your belly button toward your spine. Be sure you continue to breathe normally and do not hold your breath.  Lift your left knee and bring it toward your elbow.  Slowly extend your leg behind you without completely straightening it. Be careful not to let your hip drop down. Avoid arching your back.  Hold your leg behind you for about 6 seconds.  Return to your starting position.  Do the same exercise with your other leg.  Repeat 8 to 12 times for each leg.  Tailor sitting    Sit on the floor.  Bring your feet close to your body while crossing your ankles.  Hold this position for as long as you are comfortable.  Tailor stretching    Sit on the floor with your back straight, legs about 12 inches apart, and feet relaxed outward.  Stretch your hands forward toward your left foot, then sit up.  Stretch your hands straight forward, then sit up.  Stretch your hands forward toward your right foot, then sit  up.  Hold each stretch for 15 to 30 seconds.  Repeat 2 to 4 times.  Follow-up care is a key part of your treatment and safety. Be sure to make and go to all appointments, and call your doctor if you are having problems. It's also a good idea to know your test results and keep a list of the medicines you take.  Current as of: July 11, 2023               Content Version: 13.8    8525-2302 [employer].   Care instructions adapted under license by your healthcare professional. If you have questions about a medical condition or this instruction, always ask your healthcare professional. [employer] disclaims any warranty or liability for your use of this information.

## 2024-02-04 PROBLEM — Z78.9 NOT IMMUNE TO HEPATITIS B VIRUS: Status: RESOLVED | Noted: 2024-02-04 | Resolved: 2024-02-04

## 2024-02-04 PROBLEM — Z78.9 NOT IMMUNE TO HEPATITIS B VIRUS: Status: ACTIVE | Noted: 2024-02-04

## 2024-02-05 ENCOUNTER — PRENATAL OFFICE VISIT (OUTPATIENT)
Dept: OBGYN | Facility: CLINIC | Age: 29
End: 2024-02-05
Attending: ADVANCED PRACTICE MIDWIFE
Payer: COMMERCIAL

## 2024-02-05 ENCOUNTER — LAB (OUTPATIENT)
Dept: LAB | Facility: CLINIC | Age: 29
End: 2024-02-05
Attending: ADVANCED PRACTICE MIDWIFE
Payer: COMMERCIAL

## 2024-02-05 VITALS
DIASTOLIC BLOOD PRESSURE: 86 MMHG | WEIGHT: 284 LBS | HEIGHT: 67 IN | BODY MASS INDEX: 44.57 KG/M2 | SYSTOLIC BLOOD PRESSURE: 128 MMHG | HEART RATE: 75 BPM

## 2024-02-05 DIAGNOSIS — E66.813 CLASS 3 SEVERE OBESITY DUE TO EXCESS CALORIES WITHOUT SERIOUS COMORBIDITY WITH BODY MASS INDEX (BMI) OF 40.0 TO 44.9 IN ADULT (H): ICD-10-CM

## 2024-02-05 DIAGNOSIS — O09.90 HRP (HIGH RISK PREGNANCY), UNSPECIFIED TRIMESTER: Primary | ICD-10-CM

## 2024-02-05 DIAGNOSIS — Z78.9 NOT IMMUNE TO HEPATITIS B VIRUS: ICD-10-CM

## 2024-02-05 DIAGNOSIS — E55.9 VITAMIN D DEFICIENCY: ICD-10-CM

## 2024-02-05 DIAGNOSIS — E66.01 CLASS 3 SEVERE OBESITY DUE TO EXCESS CALORIES WITHOUT SERIOUS COMORBIDITY WITH BODY MASS INDEX (BMI) OF 40.0 TO 44.9 IN ADULT (H): ICD-10-CM

## 2024-02-05 DIAGNOSIS — O09.90 HRP (HIGH RISK PREGNANCY), UNSPECIFIED TRIMESTER: ICD-10-CM

## 2024-02-05 PROCEDURE — 36415 COLL VENOUS BLD VENIPUNCTURE: CPT

## 2024-02-05 PROCEDURE — 82105 ALPHA-FETOPROTEIN SERUM: CPT

## 2024-02-05 PROCEDURE — 99207 PR PRENATAL VISIT: CPT | Performed by: ADVANCED PRACTICE MIDWIFE

## 2024-02-05 PROCEDURE — 99213 OFFICE O/P EST LOW 20 MIN: CPT | Performed by: ADVANCED PRACTICE MIDWIFE

## 2024-02-05 PROCEDURE — 81511 FTL CGEN ABNOR FOUR ANAL: CPT | Performed by: ADVANCED PRACTICE MIDWIFE

## 2024-02-05 PROCEDURE — 36415 COLL VENOUS BLD VENIPUNCTURE: CPT | Performed by: ADVANCED PRACTICE MIDWIFE

## 2024-02-05 RX ORDER — MULTIVIT-MIN/IRON/FOLIC ACID/K 18-600-40
2 CAPSULE ORAL DAILY
Qty: 90 CAPSULE | Refills: 3 | Status: SHIPPED | OUTPATIENT
Start: 2024-02-05 | End: 2024-03-04

## 2024-02-05 RX ORDER — ASPIRIN 81 MG/1
81 TABLET ORAL DAILY
Qty: 90 TABLET | Refills: 3 | Status: ON HOLD | OUTPATIENT
Start: 2024-02-05 | End: 2024-07-27

## 2024-02-05 RX ORDER — CETIRIZINE HYDROCHLORIDE 10 MG/1
10 TABLET ORAL DAILY
COMMUNITY
End: 2024-08-08

## 2024-02-05 NOTE — PROGRESS NOTES
"Subjective:      28 year old  at 15w5d presents with [facility] for a routine prenatal appointment.        Prepregnancy BMI >40, reviewed evidence that shows increased risks associated with Prepregnancy BMI >40. Lenka agrees to growth US and weekly BPP at 34 weeks, and IOL at 39 weeks. Would like to avoid an induction but understands the rationale    Accepts Rx for LDA    Reviewed IOB labs:  Invitae negative, Accepts AFP    Reviewed IOB labs:   Urine culture: no growth. . Non-immune to Varicella, rubella, accepts vaccination postpartum.  Screened negative for HIV, syphilis, Hep B, Hep C   Bloodtype: A pos, antibody screen negative, Hemoglobin 12.5  Vitamin D level 23, not currently supplementing, accepts Rx today  GC/CT: neg/neg  GCT: 114  Hgb A1C: 5.5%  Pap : NILM  Hgb ELP negative     No vaginal bleeding or leakage of fluid.  No contractions or cramping.    Has not started to feel fetal movement yet.   No HA, visual changes, RUQ or epigastric pain.     Level II US  Scheduled.   Offered AFP accepts     Objective:  Vitals:    24 1140   BP: 128/86   Pulse: 75   Weight: 128.8 kg (284 lb)   Height: 1.7 m (5' 6.93\")     See OB flowsheet    Assessment/Plan     Encounter Diagnoses   Name Primary?     Class 3 severe obesity due to excess calories without serious comorbidity with body mass index (BMI) of 40.0 to 44.9 in adult (H)      Not immune to hepatitis B virus      Vitamin D deficiency      HRP (high risk pregnancy), unspecified trimester Yes     Orders Placed This Encounter   Procedures     Maternal Quad Marker 2nd Trimester     AFP - Alpha Fetoprotein Maternal Screen     Orders Placed This Encounter   Medications     cetirizine (ZYRTEC) 10 MG tablet     Sig: Take 10 mg by mouth daily     Cholecalciferol (VITAMIN D) 50 MCG ( UT) CAPS     Sig: Take 2 capsules by mouth daily Take one capsule daily.     Dispense:  90 capsule     Refill:  3     aspirin 81 MG EC tablet     Sig: Take 1 tablet (81 mg) by " mouth daily     Dispense:  90 tablet     Refill:  3     -Reviewed Maternal and fetal risks associated with an elevated BMI (?40) were discussed including: diabetes, hypertension,  birth (related to medical recommendations for early delivery), prolonged pregnancy (going past 42 weeks), obstructive sleep apnea, induction and induction failure, longer labor, difficult anesthesia,  delivery, macrosomia (increasing the risk for dysfunctional labor, vacuum assisted birth, tearing, excessive bleeding), blood clots, hemorrhage, infection after birth, prolonged hospitalization, postpartum depression, congenital anomalies, early pregnancy loss, and stillbirth.     We discussed midwifery led care and how this can improve satisfaction and success with her pregnancy and birth such as childbirth education and preparation, support during labor (increased mobility, upright positions, restorative hydration and eating, and support for low-intervention birth practices as appropriate, such as hydrotherapy or nitrous oxide for pain management to increase mobility, avoiding repeated exams and early release of benavidez, and respectful birth environments to increase hormones and trust in the process.    To help ensure normal growth and well being for the baby, the patient was also counseled on our usual recommendations for care for those with BMI ?40:    An early 1st trimester ultrasound    Nutrition consult with our clinic staff    A Level II US at mid pregnancy    Ultrasounds to monitor the baby's growth at the end of pregnancy, every 4-6 weeks    Monitoring during pregnancy including biophysical profile ultrasound and nonstress test heart rate monitoring weekly beginning at 34 weeks    An earlier delivery in the 39th week of pregnancy     Internal monitors during labor and delivery to better assess contractions and fetal heart rate if needed    A consultation with our anesthesia team if choosing an epidural    Uterotonics  (medication to decrease risk of severe hemorrhage) at the time of delivery    Preventative medication for blood clots after birth    Screening for diabetes after pregnancy    Lenka accepts these recommendations  -Rx for LDA and 4,000 international unit(s) vitamin D  - Reviewed total weight gain, encouraged continued healthy diet and exercise.      - Reviewed why/how to contact provider.    Patient education/orders or handouts today:  AFP only, reviewed plan for Level II   Return to clinic in 4 weeks and prn if questions or concerns.   Landy Walker CNM

## 2024-02-06 DIAGNOSIS — O09.90 HRP (HIGH RISK PREGNANCY), UNSPECIFIED TRIMESTER: ICD-10-CM

## 2024-02-06 DIAGNOSIS — E55.9 VITAMIN D DEFICIENCY: Primary | ICD-10-CM

## 2024-02-06 NOTE — TELEPHONE ENCOUNTER
Pharmacy is requesting clarification of recent vitamin D rx, which states both 2 capsules by mouth daily and 1 capsule by mouth daily.  Per lab follow up note on 1/10/24, patient was advised to take 5000u daily.    Pended 5000 unit(s) daily to [facility] for review

## 2024-02-07 LAB
# FETUSES US: NORMAL
AFP MOM SERPL: 1.28
AFP SERPL-MCNC: 28 NG/ML
AGE - REPORTED: 27.6 YR
CURRENT SMOKER: NO
FAMILY MEMBER DISEASES HX: NO
GA METHOD: NORMAL
GA: NORMAL WK
IDDM PATIENT QL: NO
INTEGRATED SCN PATIENT-IMP: NORMAL
SPECIMEN DRAWN SERPL: NORMAL

## 2024-02-20 ENCOUNTER — ANCILLARY PROCEDURE (OUTPATIENT)
Dept: ULTRASOUND IMAGING | Facility: HOSPITAL | Age: 29
End: 2024-02-20
Attending: STUDENT IN AN ORGANIZED HEALTH CARE EDUCATION/TRAINING PROGRAM
Payer: COMMERCIAL

## 2024-02-20 ENCOUNTER — OFFICE VISIT (OUTPATIENT)
Dept: MATERNAL FETAL MEDICINE | Facility: HOSPITAL | Age: 29
End: 2024-02-20
Attending: STUDENT IN AN ORGANIZED HEALTH CARE EDUCATION/TRAINING PROGRAM
Payer: COMMERCIAL

## 2024-02-20 DIAGNOSIS — O26.90 PREGNANCY RELATED CONDITION, ANTEPARTUM: ICD-10-CM

## 2024-02-20 DIAGNOSIS — Z36.2 ENCOUNTER FOR FOLLOW-UP ULTRASOUND OF FETAL ANATOMY: Primary | ICD-10-CM

## 2024-02-20 PROCEDURE — 76811 OB US DETAILED SNGL FETUS: CPT

## 2024-02-20 PROCEDURE — 99213 OFFICE O/P EST LOW 20 MIN: CPT | Mod: 25 | Performed by: STUDENT IN AN ORGANIZED HEALTH CARE EDUCATION/TRAINING PROGRAM

## 2024-02-20 PROCEDURE — 76811 OB US DETAILED SNGL FETUS: CPT | Mod: 26 | Performed by: STUDENT IN AN ORGANIZED HEALTH CARE EDUCATION/TRAINING PROGRAM

## 2024-02-20 NOTE — NURSING NOTE
Lenka Adams is a  at 18w0d who presents to [facility] for a comprehensive ultrasound. Pt reports positive fetal movement. Pt denies bldg/lof/change in discharge, contractions, headache, vision changes, chest pain/SOB or edema. SBAR given to Dr. HERIBERTO Mahmood, see note in Epic.      Sandie Cook RN

## 2024-02-20 NOTE — PROGRESS NOTES
"Please see \"Imaging\" tab under \"Chart Review\" for details of today's visit.    Mary Mahmood    "

## 2024-02-21 ENCOUNTER — TELEPHONE (OUTPATIENT)
Dept: OBGYN | Facility: CLINIC | Age: 29
End: 2024-02-21
Payer: COMMERCIAL

## 2024-02-21 ENCOUNTER — MYC MEDICAL ADVICE (OUTPATIENT)
Dept: OBGYN | Facility: CLINIC | Age: 29
End: 2024-02-21
Payer: COMMERCIAL

## 2024-02-22 ENCOUNTER — NURSE TRIAGE (OUTPATIENT)
Dept: OBGYN | Facility: CLINIC | Age: 29
End: 2024-02-22
Payer: COMMERCIAL

## 2024-02-22 ENCOUNTER — LAB (OUTPATIENT)
Dept: LAB | Facility: CLINIC | Age: 29
End: 2024-02-22
Payer: COMMERCIAL

## 2024-02-22 DIAGNOSIS — R35.0 URINARY FREQUENCY: Primary | ICD-10-CM

## 2024-02-22 DIAGNOSIS — R35.0 URINARY FREQUENCY: ICD-10-CM

## 2024-02-22 LAB
ALBUMIN UR-MCNC: 10 MG/DL
APPEARANCE UR: CLEAR
BILIRUB UR QL STRIP: NEGATIVE
COLOR UR AUTO: YELLOW
GLUCOSE UR STRIP-MCNC: NEGATIVE MG/DL
HGB UR QL STRIP: NEGATIVE
KETONES UR STRIP-MCNC: 60 MG/DL
LEUKOCYTE ESTERASE UR QL STRIP: ABNORMAL
MUCOUS THREADS #/AREA URNS LPF: PRESENT /LPF
NITRATE UR QL: NEGATIVE
PH UR STRIP: 6.5 [PH] (ref 5–7)
RBC URINE: 2 /HPF
SP GR UR STRIP: 1.02 (ref 1–1.03)
SQUAMOUS EPITHELIAL: 8 /HPF
TRANSITIONAL EPI: <1 /HPF
UROBILINOGEN UR STRIP-MCNC: NORMAL MG/DL
WBC URINE: 7 /HPF

## 2024-02-22 PROCEDURE — 87086 URINE CULTURE/COLONY COUNT: CPT

## 2024-02-22 PROCEDURE — 81001 URINALYSIS AUTO W/SCOPE: CPT

## 2024-02-22 RX ORDER — CEFPODOXIME PROXETIL 100 MG/1
100 TABLET, FILM COATED ORAL 2 TIMES DAILY
Qty: 28 TABLET | Refills: 0 | Status: SHIPPED | OUTPATIENT
Start: 2024-02-22 | End: 2024-02-22

## 2024-02-22 RX ORDER — CEFPODOXIME PROXETIL 100 MG/1
100 TABLET, FILM COATED ORAL 2 TIMES DAILY
Qty: 14 TABLET | Refills: 0 | Status: SHIPPED | OUTPATIENT
Start: 2024-02-22 | End: 2024-02-29

## 2024-02-22 NOTE — TELEPHONE ENCOUNTER
"Called patient per [employer] message.  at 18w2d. Patient c/o cloudy urine, urinary frequency, urgency, hesitancy, pelvic pressure, low back pain. Had chills yesterday, felt feverish, none today. Denies dysuria, hematuria. UA and UC ordered per protocol. Cefpodoxime pended to provider per protocol. Pt declines pyridium as she is not experiencing urinary discomfort at this time. Also c/o thick white discharge, not cottage cheese like, just more than usual. No vaginal itching, vulvar burning or redness. Reports her \"vagina feels weird,\" overall uncomfortable. Has had yeast infection before and states this doesn't feel like that since she's not itchy and the discharge is different. Routed to provider for recommendation.     Additional Information   Negative: Shock suspected (e.g., cold/pale/clammy skin, too weak to stand, low BP, rapid pulse)   Negative: Sounds like a life-threatening emergency to the triager   Negative: Followed a genital area injury (e.g., vagina, vulva)   Negative: Unable to urinate (or only a few drops) for > 4 hours and bladder feels very full (e.g., palpable bladder or strong urge to urinate)   Negative: Pregnant 23 or more weeks and baby is moving less today (e.g., kick count < 5 in 1 hour or < 10 in 2 hours)   Negative: SEVERE pain with urination    Answer Assessment - Initial Assessment Questions  1. SEVERITY: \"How bad is the pain?\"        No pain    2. FREQUENCY: \"How many times have you had painful urination today?\"       No painful urination, but urinating every 1-2 hours          Increased frequency started about 4 days ago    5. FEVER: \"Do you have a fever?\" If Yes, ask: \"What is your temperature, how was it measured, and when did it start?\"      None currently, pt thinks she may have had mild fever yesterday, had chills    7. OTHER SYMPTOMS: \"Do you have any other symptoms?\" (e.g., flank or back pain, contractions, blood in urine)      Pain in low back, constant lower abdominal " "pressure, no blood in urine    8. MICHEAL: \"What date are you expecting to deliver?\"       7/23/24    9. PREGNANCY: \"How many weeks pregnant are you?\"      18+2    Protocols used: Pregnancy - Urination Pain-A-[place]    "

## 2024-02-22 NOTE — TELEPHONE ENCOUNTER
Returned page from patient.    Pt reports some possible vaginal and urinary symptoms. Notes some increased white discharge, no itching or irritation. Also has mild urinary frequency and urgency but denies dysuria and hematuria. No obvious flank pain. Some mild back soreness.    Does note that she overall doesn't feel well and has had some chills this afternoon.   Not sure if she has a fever, does not have thermometer.   Has not taken tylenol.    Reviewed evaluation in ED for potential uti, pyelo, vaginal infection.  At this time, pt desires to take 1000mg tylenol and observe at home.    Reviewed s/s to present.   Will send epic message to RN team to call pt tomorrow.    Pt verbalized understanding and agrees with plan of care.     ERIC Boo, SELAM

## 2024-02-22 NOTE — TELEPHONE ENCOUNTER
Per provider:   Hi, I signed the pended antibiotic.  Can offer a multiplex swab if itching,   burning or odor to discharge       Called patient, relayed message. Pt verbalized understanding.

## 2024-02-23 LAB — BACTERIA UR CULT: NORMAL

## 2024-03-01 NOTE — PATIENT INSTRUCTIONS
"Weeks 18 to 22 of Your Pregnancy: Care Instructions  At this stage you may find that your nausea and fatigue are gone. You may feel better overall and have more energy. But you might now also have some new discomforts, like sleep problems or leg cramps.    You may start to feel your baby move. These movements can feel like butterflies or bubbles.   Babies at this stage can now suck their thumbs.     Get some exercise every day.  And avoid caffeine late in the day.     Take a warm shower or bath before bed.  Try relaxation exercises to calm your mind and body.     Use extra pillows.  They can help you get comfortable.     Don't use sleeping pills or alcohol.  They could harm your baby.     For leg cramps, stretch and apply heat.  A warm bath, leg warmers, a heating pad, or a hot water bottle can help with muscle aches.   Stretches for leg cramps    Straighten your leg and bend your foot (flex your ankle) slowly upward, toward your knee. Bend your toes up and down.   Stand on a flat surface. Stretch your toes upward. For balance, hold on to the wall or something stable. If it feels okay, take small steps walking on your heels.   Follow-up care is a key part of your treatment and safety. Be sure to make and go to all appointments, and call your doctor if you are having problems. It's also a good idea to know your test results and keep a list of the medicines you take.  Where can you learn more?  Go to https://www.[employer].net/patiented  Enter W603 in the search box to learn more about \"Weeks 18 to 22 of Your Pregnancy: Care Instructions.\"  Current as of: July 11, 2023               Content Version: 13.8    0198-4790 [employer].   Care instructions adapted under license by your healthcare professional. If you have questions about a medical condition or this instruction, always ask your healthcare professional. [employer] disclaims any warranty or liability for your use of this " information.    Thank you for trusting us with your care!     If you need to contact us for questions about:  Symptoms, Scheduling & Medical Questions; Non-urgent (2-3 day response) Inna message, Urgent (needing response today) [phone] (if after 3:30pm next day response)   Prescriptions: Please call your Pharmacy   Billing: [facility] [phone] or [facility]:[phone]

## 2024-03-03 NOTE — PROGRESS NOTES
"Subjective:      28 year old  at 19w5d presents for a routine prenatal appointment.         Denies cramping/contractions, vaginal bleeding, discharge or leakage of fluid.  Reports +fetal movement.  No HA, vision changes, ruq/epigastric pain.       Patient concerns: Feeling well overall. Had level 2 ultrasound last month- placenta anterior. Next in April.   Started aspirin.    Objective:  Vitals:    24 1414   BP: 116/75   Pulse: 75   Weight: 128.7 kg (283 lb 12.8 oz)   Height: 1.7 m (5' 6.93\")         See OB flowsheet    Assessment/Plan     Encounter Diagnoses   Name Primary?    HRP (high risk pregnancy), unspecified trimester Yes    Prepregnancy BMI 43     Vitamin D deficiency        - Reviewed total weight gain, encouraged continued healthy diet and exercise.      - Reviewed why/how to contact provider.      Patient education/orders or handouts today:  PTL signs/symptoms and fetal movement    - Reviewed varicella and rubella nonimmune. Plan vaccination postpartum.  - Continue 81mg aspirin daily for pre-e prophylaxis.   - Recommended 1182-5325 international unit(s)/day vitamin D supplementation.   - [facility] reviewed  surveillance for pre-preg BMI >40. Growth ultrasounds at 28 and 34 weeks, weekly BPPs at 34, IOL 39 weeks.   - Next [facility] ultrasound scheduled for 3/12/24.    Continue scheduled prenatal care, RTC in 4 weeks and prn if questions or concerns.      Lynsey Blunt, ERIC, CNM   "

## 2024-03-04 ENCOUNTER — PRENATAL OFFICE VISIT (OUTPATIENT)
Dept: OBGYN | Facility: CLINIC | Age: 29
End: 2024-03-04
Attending: ADVANCED PRACTICE MIDWIFE
Payer: COMMERCIAL

## 2024-03-04 VITALS
DIASTOLIC BLOOD PRESSURE: 75 MMHG | HEIGHT: 67 IN | BODY MASS INDEX: 44.54 KG/M2 | HEART RATE: 75 BPM | WEIGHT: 283.8 LBS | SYSTOLIC BLOOD PRESSURE: 116 MMHG

## 2024-03-04 DIAGNOSIS — E66.813 CLASS 3 SEVERE OBESITY DUE TO EXCESS CALORIES WITHOUT SERIOUS COMORBIDITY WITH BODY MASS INDEX (BMI) OF 40.0 TO 44.9 IN ADULT (H): ICD-10-CM

## 2024-03-04 DIAGNOSIS — E55.9 VITAMIN D DEFICIENCY: ICD-10-CM

## 2024-03-04 DIAGNOSIS — O09.90 HRP (HIGH RISK PREGNANCY), UNSPECIFIED TRIMESTER: Primary | ICD-10-CM

## 2024-03-04 DIAGNOSIS — E66.01 CLASS 3 SEVERE OBESITY DUE TO EXCESS CALORIES WITHOUT SERIOUS COMORBIDITY WITH BODY MASS INDEX (BMI) OF 40.0 TO 44.9 IN ADULT (H): ICD-10-CM

## 2024-03-04 PROCEDURE — 99207 PR PRENATAL VISIT: CPT | Performed by: ADVANCED PRACTICE MIDWIFE

## 2024-03-04 PROCEDURE — 99213 OFFICE O/P EST LOW 20 MIN: CPT | Performed by: ADVANCED PRACTICE MIDWIFE

## 2024-03-07 ENCOUNTER — LAB (OUTPATIENT)
Dept: LAB | Facility: HOSPITAL | Age: 29
End: 2024-03-07
Payer: COMMERCIAL

## 2024-03-07 ENCOUNTER — MYC MEDICAL ADVICE (OUTPATIENT)
Dept: OBGYN | Facility: CLINIC | Age: 29
End: 2024-03-07

## 2024-03-07 DIAGNOSIS — R39.9 UTI SYMPTOMS: ICD-10-CM

## 2024-03-07 DIAGNOSIS — R39.9 UTI SYMPTOMS: Primary | ICD-10-CM

## 2024-03-07 LAB
ALBUMIN UR-MCNC: NEGATIVE MG/DL
APPEARANCE UR: CLEAR
BILIRUB UR QL STRIP: NEGATIVE
COLOR UR AUTO: COLORLESS
GLUCOSE UR STRIP-MCNC: NEGATIVE MG/DL
HGB UR QL STRIP: NEGATIVE
KETONES UR STRIP-MCNC: NEGATIVE MG/DL
LEUKOCYTE ESTERASE UR QL STRIP: NEGATIVE
NITRATE UR QL: NEGATIVE
PH UR STRIP: 6.5 [PH] (ref 5–7)
RBC URINE: <1 /HPF
SP GR UR STRIP: 1.01 (ref 1–1.03)
UROBILINOGEN UR STRIP-MCNC: <2 MG/DL
WBC URINE: <1 /HPF

## 2024-03-07 PROCEDURE — 87086 URINE CULTURE/COLONY COUNT: CPT

## 2024-03-07 PROCEDURE — 81001 URINALYSIS AUTO W/SCOPE: CPT

## 2024-03-07 RX ORDER — CEFPODOXIME PROXETIL 100 MG/1
100 TABLET, FILM COATED ORAL 2 TIMES DAILY
Qty: 14 TABLET | Refills: 0 | Status: CANCELLED | OUTPATIENT
Start: 2024-03-07 | End: 2024-03-14

## 2024-03-07 NOTE — TELEPHONE ENCOUNTER
Called patient regarding MyChart message. She reports symptoms started on Tuesday with urinary urgency and frequency.  She has lower abdominal pain when she feels her bladder is full.  It takes a while to start the urine stream. Denies flank pain, fever, and chills.  She reports an allergy to Clindamycin with hives and itchiness as her reaction.     UA/UC ordered per clinic protocol. Patient will [facility].  Pended antibiotic to CNM on call and notified of labs ordered.    Initial (On Arrival)

## 2024-03-08 LAB — BACTERIA UR CULT: NO GROWTH

## 2024-03-10 ENCOUNTER — HEALTH MAINTENANCE LETTER (OUTPATIENT)
Age: 29
End: 2024-03-10

## 2024-03-12 ENCOUNTER — PRENATAL OFFICE VISIT (OUTPATIENT)
Dept: OBGYN | Facility: CLINIC | Age: 29
End: 2024-03-12
Attending: ADVANCED PRACTICE MIDWIFE
Payer: COMMERCIAL

## 2024-03-12 ENCOUNTER — OFFICE VISIT (OUTPATIENT)
Dept: MATERNAL FETAL MEDICINE | Facility: CLINIC | Age: 29
End: 2024-03-12
Attending: STUDENT IN AN ORGANIZED HEALTH CARE EDUCATION/TRAINING PROGRAM
Payer: COMMERCIAL

## 2024-03-12 ENCOUNTER — HOSPITAL ENCOUNTER (OUTPATIENT)
Dept: ULTRASOUND IMAGING | Facility: CLINIC | Age: 29
Discharge: HOME | End: 2024-03-12
Attending: STUDENT IN AN ORGANIZED HEALTH CARE EDUCATION/TRAINING PROGRAM
Payer: COMMERCIAL

## 2024-03-12 VITALS
HEART RATE: 85 BPM | BODY MASS INDEX: 45.2 KG/M2 | HEIGHT: 67 IN | WEIGHT: 288 LBS | DIASTOLIC BLOOD PRESSURE: 77 MMHG | SYSTOLIC BLOOD PRESSURE: 127 MMHG

## 2024-03-12 DIAGNOSIS — O99.210 OBESITY IN PREGNANCY: Primary | ICD-10-CM

## 2024-03-12 DIAGNOSIS — R39.15 URINARY URGENCY: ICD-10-CM

## 2024-03-12 DIAGNOSIS — O09.90 HRP (HIGH RISK PREGNANCY), UNSPECIFIED TRIMESTER: Primary | ICD-10-CM

## 2024-03-12 DIAGNOSIS — Z36.2 ENCOUNTER FOR FOLLOW-UP ULTRASOUND OF FETAL ANATOMY: ICD-10-CM

## 2024-03-12 LAB
BACTERIAL VAGINOSIS VAG-IMP: POSITIVE
CANDIDA DNA VAG QL NAA+PROBE: NOT DETECTED
CANDIDA GLABRATA / CANDIDA KRUSEI DNA: NOT DETECTED
T VAGINALIS DNA VAG QL NAA+PROBE: NOT DETECTED

## 2024-03-12 PROCEDURE — 99207 PR PRENATAL VISIT: CPT | Performed by: ADVANCED PRACTICE MIDWIFE

## 2024-03-12 PROCEDURE — 0352U MULTIPLEX VAGINAL PANEL BY PCR: CPT | Performed by: ADVANCED PRACTICE MIDWIFE

## 2024-03-12 PROCEDURE — 99213 OFFICE O/P EST LOW 20 MIN: CPT | Performed by: ADVANCED PRACTICE MIDWIFE

## 2024-03-12 PROCEDURE — 76816 OB US FOLLOW-UP PER FETUS: CPT

## 2024-03-12 PROCEDURE — 76816 OB US FOLLOW-UP PER FETUS: CPT | Mod: 26 | Performed by: STUDENT IN AN ORGANIZED HEALTH CARE EDUCATION/TRAINING PROGRAM

## 2024-03-12 RX ORDER — SENNA AND DOCUSATE SODIUM 50; 8.6 MG/1; MG/1
1 TABLET, FILM COATED ORAL AT BEDTIME
Qty: 60 TABLET | Refills: 2 | Status: SHIPPED | OUTPATIENT
Start: 2024-03-12

## 2024-03-12 NOTE — PROGRESS NOTES
"Subjective:  28 year old  at 21w0d presents for a routine prenatal appointment.      Denies vaginal bleeding or leakage of fluid.  Denies contractions or cramping. Slight fetal movement.       No HA, visual changes, RUQ or epigastric pain.   The patient presents with the following concerns:   - Reports constipation, BM q 3 days, + straining.  Has tried increased fluids and fiber intake with no significant improvement.  - Reports lower abdominal pain and increased urinary urgency.  Neg UA/UC 3/7/24 and 24.  Denies any abnormal vaginal discharge.  Agreeable to self-swab multiplex.    Level II US  Results reviewed normal scan. Repeat scheduled today at 1415.    Objective:  Vitals:    24 0814   BP: 127/77   Pulse: 85   Weight: 130.6 kg (288 lb)   Height: 1.7 m (5' 6.93\")   See OB flowsheet    Assessment/Plan:   Encounter Diagnoses   Name Primary?    HRP (high risk pregnancy), unspecified trimester Yes    Urinary urgency      Orders Placed This Encounter   Procedures    Multiplex Vaginal Panel by PCR     Orders Placed This Encounter   Medications    SENNA-docusate sodium (SENNA S) 8.6-50 MG tablet     Sig: Take 1 tablet by mouth at bedtime     Dispense:  60 tablet     Refill:  2     - Reviewed total weight gain, encouraged continued healthy diet and exercise.      - Reviewed why/how to contact provider.   - Patient education/orders or handouts today: PTL signs/symptoms and Fetal movement  - Return to clinic in 4 weeks and prn if questions or concerns.     ERIC Baker CNM  "

## 2024-03-12 NOTE — PROGRESS NOTES
Please see the full imaging report from the [employer] program under the imaging tab.    Samira Mahmood MD  Maternal Fetal Medicine

## 2024-03-12 NOTE — NURSING NOTE
Patient here for follow up ultrasound for anatomy not seen on previous ultrasound.  Denies questions or concerns today. Patient states she feels some fetal movement. SBAR given to AZAM EVANGELISTA, see their note in Epic.

## 2024-03-13 DIAGNOSIS — B96.89 BACTERIAL VAGINOSIS IN PREGNANCY: Primary | ICD-10-CM

## 2024-03-13 DIAGNOSIS — O23.599 BACTERIAL VAGINOSIS IN PREGNANCY: Primary | ICD-10-CM

## 2024-03-13 RX ORDER — METRONIDAZOLE 500 MG/1
500 TABLET ORAL 2 TIMES DAILY
Qty: 14 TABLET | Refills: 0 | Status: SHIPPED | OUTPATIENT
Start: 2024-03-13 | End: 2024-03-20

## 2024-04-09 ENCOUNTER — PRENATAL OFFICE VISIT (OUTPATIENT)
Dept: OBGYN | Facility: CLINIC | Age: 29
End: 2024-04-09
Attending: ADVANCED PRACTICE MIDWIFE
Payer: COMMERCIAL

## 2024-04-09 VITALS
SYSTOLIC BLOOD PRESSURE: 135 MMHG | DIASTOLIC BLOOD PRESSURE: 83 MMHG | HEART RATE: 73 BPM | WEIGHT: 291 LBS | BODY MASS INDEX: 45.67 KG/M2

## 2024-04-09 DIAGNOSIS — E66.813 CLASS 3 SEVERE OBESITY DUE TO EXCESS CALORIES WITHOUT SERIOUS COMORBIDITY WITH BODY MASS INDEX (BMI) OF 40.0 TO 44.9 IN ADULT (H): ICD-10-CM

## 2024-04-09 DIAGNOSIS — O09.90 HRP (HIGH RISK PREGNANCY), UNSPECIFIED TRIMESTER: Primary | ICD-10-CM

## 2024-04-09 DIAGNOSIS — E66.01 CLASS 3 SEVERE OBESITY DUE TO EXCESS CALORIES WITHOUT SERIOUS COMORBIDITY WITH BODY MASS INDEX (BMI) OF 40.0 TO 44.9 IN ADULT (H): ICD-10-CM

## 2024-04-09 PROCEDURE — 99213 OFFICE O/P EST LOW 20 MIN: CPT | Performed by: ADVANCED PRACTICE MIDWIFE

## 2024-04-09 PROCEDURE — 99207 PR PRENATAL VISIT: CPT | Performed by: ADVANCED PRACTICE MIDWIFE

## 2024-04-09 NOTE — PROGRESS NOTES
Subjective:      28 year old  at 25w0d presents for a routine prenatal appointment.         Denies cramping/contractions, vaginal bleeding, discharge or leakage of fluid. Reports +fetal movement.     Patient concerns: Feeling well overall. Has no concerns today.    Addressed constipation from last visit. She states she is able to stool about 1x per day,  soft. She does not feel like she is straining.    Passed early 1 hour GCT. Encouraged to continue daily ASA.    Had MFM ultrasound scheduled for .     Objective:  Vitals:    24 1152   BP: 135/83   Pulse: 73   Weight: 132 kg (291 lb)         See OB flowsheet    Assessment/Plan     Encounter Diagnoses   Name Primary?    Prepregnancy BMI 43     HRP (high risk pregnancy), unspecified trimester Yes       - MFM reviewed  surveillance d/t pre-preg BMI >40. Growth ultrasounds at 28 and 34 weeks, weekly BPPs at 34, IOL 39 weeks.   - Next MFM ultrasound scheduled for 24.  - Plan EOB next visit.    Continue scheduled prenatal care, RTC in 3 weeks and prn if questions or concerns.     Asha CORMIER, [employer] student    I, BLANCA, completed the PFSH and ROS. I then acted as a scribe for CNM for the remainder of the visit. -  Asha CORMIER, [employer] student    The encounter was performed by me and scribed by the SNM. The scribed note accurately reflects my personal services and decisions made by me.     ERIC Boo, SELAM

## 2024-04-09 NOTE — PATIENT INSTRUCTIONS
"Weeks 22 to 26 of Your Pregnancy: Care Instructions  Your baby's lungs are getting ready for breathing. Your baby may respond to your voice. Your baby likely turns less, and kicks or jerks more. Jerking may mean that your baby has hiccups.    Think about taking childbirth classes. And start to think about whether you want to have pain medicine during labor.    At your next doctor visit, you may be tested for anemia and for high blood sugar that first occurs during pregnancy (gestational diabetes). These conditions can cause problems for you and your baby.    To ease discomfort, such as back pain    Change your position often. Try not to sit or stand for too long.  Get some exercise. Things like walking or stretching may help.  Try using a heating pad or cold pack.    To ease or reduce swelling in your feet, ankles, hands, and fingers    Take off your rings.  Avoid high-sodium foods, such as potato chips.  Prop up your feet, and sleep with pillows under your feet.  Try to avoid standing for long periods of time.  Do not wear tight shoes.  Wear support stockings.  Kegel exercises to prevent urine from leaking    Squeeze your muscles as if you were trying not to pass gas. Your belly, legs, and buttocks shouldn't move. Hold the squeeze for 3 seconds, then relax for 5 to 10 seconds.    Add 1 second each week until you can squeeze for 10 seconds. Repeat the exercise 10 times a session. Do 3 to 8 sessions a day. If these exercises cause you pain, stop doing them and talk with your doctor.  Follow-up care is a key part of your treatment and safety. Be sure to make and go to all appointments, and call your doctor if you are having problems. It's also a good idea to know your test results and keep a list of the medicines you take.  Where can you learn more?  Go to https://www.healthwise.net/patiented  Enter G264 in the search box to learn more about \"Weeks 22 to 26 of Your Pregnancy: Care Instructions.\"  Current as of: July " 10, 2023               Content Version: 14.0    1224-8503 [employer].   Care instructions adapted under license by your healthcare professional. If you have questions about a medical condition or this instruction, always ask your healthcare professional. [employer] disclaims any warranty or liability for your use of this information.      Learning About Screening for Gestational Diabetes  What is gestational diabetes screening?     Screening for gestational diabetes is a way to look for high blood sugar during pregnancy. You drink some very sweet liquid. Then you have a blood test to see how your body uses sugar (glucose).  How is gestational diabetes screening done?  Screening for gestational diabetes may be done in a couple of ways.  Two-part screening.  Part one (glucose challenge test): A blood sample is taken after you drink a liquid that contains sugar (glucose). You don't need to stop eating or drinking before this test. If the test shows that you don't have a lot of sugar in your blood, you don't have gestational diabetes.  Part two (oral glucose tolerance test, or OGTT): If the first test shows a lot of sugar in your blood, then you may have an OGTT. You can't eat or drink for at least 8 hours before this test. A blood sample is taken, then you drink a sweet liquid. You have more blood tests after 1 to 3 hours. If the OGTT shows that you have a lot of sugar in your blood, you may have gestational diabetes.  One-part screening.  Sometimes doctors use the OGTT on its own. If the test shows that you don't have a lot of sugar in your blood, you don't have gestational diabetes. If you do have a lot of sugar in your blood, you may have the condition.  What are the risks of screening?  Your blood glucose level may drop very low toward the end of the test. If this happens, you may feel weak, hungry, and restless. Tell your doctor if you have these symptoms. The test usually will be  "stopped.  You may vomit after drinking the sweet liquid. If this happens, you may need to take the test at a later time.  Your doctor may do more glucose tests at other times during your pregnancy.  Follow-up care is a key part of your treatment and safety. Be sure to make and go to all appointments, and call your doctor if you are having problems. It's also a good idea to know your test results and keep a list of the medicines you take.  Where can you learn more?  Go to https://www.[employer].net/patiented  Enter A472 in the search box to learn more about \"Learning About Screening for Gestational Diabetes.\"  Current as of: October 2, 2023               Content Version: 14.0    7419-6661 [employer].   Care instructions adapted under license by your healthcare professional. If you have questions about a medical condition or this instruction, always ask your healthcare professional. [employer] disclaims any warranty or liability for your use of this information.      You have been provided the My Labor and Birth Wishes document.  Please review at home and bring to your next prenatal visit. Bring this sheet to the hospital for your birth. Give copies to your care team members and support person.   Additional copies can be found here:  [email or web address]  "

## 2024-04-23 ENCOUNTER — TELEPHONE (OUTPATIENT)
Dept: OBGYN | Facility: CLINIC | Age: 29
End: 2024-04-23
Payer: COMMERCIAL

## 2024-04-29 NOTE — PATIENT INSTRUCTIONS
Weeks 26 to 30 of Your Pregnancy: Care Instructions  You're starting your last trimester. You'll probably feel your baby moving around more. Your back may ache as your body gets used to your baby's size and length. Take care of yourself, and pay attention to what your body needs.    Talk to your doctor about getting the Tdap shot. It will help protect your  against whooping cough (pertussis). Also ask your doctor about flu and COVID-19 shots if you haven't had them yet. If your blood type is Rh negative, you may be given a shot of Rh immune globulin (such as RhoGAM). It can help prevent problems for your baby.    You may have Ronaldo-Feliciano contractions. They are single or several strong contractions without a pattern. These are practice contractions but not the start of labor.  Be kind to yourself.     Take breaks when you're tired.  Change positions often. Don't sit for too long or stand for too long.  At work, rest during breaks if you can. If you don't get breaks, talk to your doctor about writing a letter to your employer to request them.  Avoid fumes, chemicals, and tobacco smoke.  Be sexual if you want to.     You may be interested in sex, or you may not. Everyone is different.  Sex is okay unless your doctor tells you not to.  Your belly can make it hard to find good positions for sex. [place] and explore.  Watch for signs of  labor.    These signs include:   Menstrual-like cramps. Or you may have pain or pressure in your pelvis that happens in a pattern.  About 6 or more contractions in an hour (even after rest and a glass of water).  A low, dull backache that doesn't go away when you change positions.  An increase or change in vaginal discharge.  Light vaginal bleeding or spotting.  Your water breaking.  Know what to do if you think you are having contractions.     Drink 1 or 2 glasses of water.  Lie down on your left side for at least an hour.  While on your side, feel the top of your  "belly to see if it's tight.  Write down your contractions for an hour. Time how long it is from the start of one contraction to the start of the next.  Call your doctor if you have regular contractions.  Follow-up care is a key part of your treatment and safety. Be sure to make and go to all appointments, and call your doctor if you are having problems. It's also a good idea to know your test results and keep a list of the medicines you take.  Where can you learn more?  Go to https://www.[employer].net/patiented  Enter S999 in the search box to learn more about \"Weeks 26 to 30 of Your Pregnancy: Care Instructions.\"  Current as of: July 10, 2023               Content Version: 14.0    1360-6535 [employer].   Care instructions adapted under license by your healthcare professional. If you have questions about a medical condition or this instruction, always ask your healthcare professional. [employer] disclaims any warranty or liability for your use of this information.      Counting Your Baby's Kicks: Care Instructions  Overview     Counting your baby's kicks is one way your doctor can tell that your baby is healthy. You will probably feel your baby move for the first time between 16 and 22 weeks. The movement may feel like flutters rather than kicks. Your baby may move more at certain times of the day. When you are active, you may notice less kicking than when you are resting. At your prenatal visits, your doctor will ask whether the baby is active.  In your last trimester, your doctor may ask you to count the number of times you feel your baby move.  Follow-up care is a key part of your treatment and safety. Be sure to make and go to all appointments, and call your doctor if you are having problems. It's also a good idea to know your test results and keep a list of the medicines you take.  How do you count fetal kicks?  A common method of checking your baby's movement is to note the length " "of time it takes to count 10 movements (such as kicks, flutters, or rolls).  Pick your baby's most active time of day to count. This may be any time from morning to evening.  If you don't feel 10 movements in an hour, have something to eat or drink and count for another hour. If you don't feel at least 10 movements in the 2-hour period, call your doctor.  Do not use an at-home Doppler heart monitor in place of counting fetal movements.  When should you call for help?   Call your doctor now or seek immediate medical care if:    You feel fewer than 10 movements in a 2-hour period.     You noticed that your baby has stopped moving or is moving less than normal.   Watch closely for changes in your health, and be sure to contact your doctor if you have any problems.  Where can you learn more?  Go to https://www.[email or web address]/patiented  Enter U048 in the search box to learn more about \"Counting Your Baby's Kicks: Care Instructions.\"  Current as of: July 10, 2023               Content Version: 14.0    3092-6517 [employer].   Care instructions adapted under license by your healthcare professional. If you have questions about a medical condition or this instruction, always ask your healthcare professional. [employer] disclaims any warranty or liability for your use of this information.      Thank you for trusting us with your care!     If you need to contact us for questions about:  Symptoms, Scheduling & Medical Questions; Non-urgent (2-3 day response) [employer] message, Urgent (needing response today) [phone] (if after 3:30pm next day response)   Prescriptions: Please call your Pharmacy   Billing: Ciara [phone] or  Physicians:[phone]    "

## 2024-04-30 ENCOUNTER — OFFICE VISIT (OUTPATIENT)
Dept: MATERNAL FETAL MEDICINE | Facility: CLINIC | Age: 29
End: 2024-04-30
Attending: STUDENT IN AN ORGANIZED HEALTH CARE EDUCATION/TRAINING PROGRAM
Payer: COMMERCIAL

## 2024-04-30 ENCOUNTER — LAB (OUTPATIENT)
Dept: LAB | Facility: CLINIC | Age: 29
End: 2024-04-30
Attending: STUDENT IN AN ORGANIZED HEALTH CARE EDUCATION/TRAINING PROGRAM
Payer: COMMERCIAL

## 2024-04-30 ENCOUNTER — PRENATAL OFFICE VISIT (OUTPATIENT)
Dept: OBGYN | Facility: CLINIC | Age: 29
End: 2024-04-30
Attending: REGISTERED NURSE
Payer: COMMERCIAL

## 2024-04-30 ENCOUNTER — HOSPITAL ENCOUNTER (OUTPATIENT)
Dept: ULTRASOUND IMAGING | Facility: CLINIC | Age: 29
Discharge: HOME | End: 2024-04-30
Attending: STUDENT IN AN ORGANIZED HEALTH CARE EDUCATION/TRAINING PROGRAM
Payer: COMMERCIAL

## 2024-04-30 VITALS
HEART RATE: 77 BPM | BODY MASS INDEX: 45.99 KG/M2 | DIASTOLIC BLOOD PRESSURE: 80 MMHG | WEIGHT: 293 LBS | SYSTOLIC BLOOD PRESSURE: 122 MMHG | HEIGHT: 67 IN

## 2024-04-30 DIAGNOSIS — O09.90 HRP (HIGH RISK PREGNANCY), UNSPECIFIED TRIMESTER: Primary | ICD-10-CM

## 2024-04-30 DIAGNOSIS — O09.90 HRP (HIGH RISK PREGNANCY), UNSPECIFIED TRIMESTER: ICD-10-CM

## 2024-04-30 DIAGNOSIS — O99.210 OBESITY IN PREGNANCY: Primary | ICD-10-CM

## 2024-04-30 DIAGNOSIS — O99.210 OBESITY IN PREGNANCY: ICD-10-CM

## 2024-04-30 DIAGNOSIS — E66.01 MORBIDLY OBESE (H): ICD-10-CM

## 2024-04-30 LAB
ERYTHROCYTE [DISTWIDTH] IN BLOOD BY AUTOMATED COUNT: 14.2 % (ref 10–15)
GLUCOSE 1H P 50 G GLC PO SERPL-MCNC: 122 MG/DL (ref 70–129)
HCT VFR BLD AUTO: 35.5 % (ref 35–47)
HGB BLD-MCNC: 11.7 G/DL (ref 11.7–15.7)
MCH RBC QN AUTO: 27.7 PG (ref 26.5–33)
MCHC RBC AUTO-ENTMCNC: 33 G/DL (ref 31.5–36.5)
MCV RBC AUTO: 84 FL (ref 78–100)
PLATELET # BLD AUTO: 307 10E3/UL (ref 150–450)
RBC # BLD AUTO: 4.22 10E6/UL (ref 3.8–5.2)
WBC # BLD AUTO: 9.4 10E3/UL (ref 4–11)

## 2024-04-30 PROCEDURE — 36415 COLL VENOUS BLD VENIPUNCTURE: CPT

## 2024-04-30 PROCEDURE — 82950 GLUCOSE TEST: CPT

## 2024-04-30 PROCEDURE — 85018 HEMOGLOBIN: CPT

## 2024-04-30 PROCEDURE — 86780 TREPONEMA PALLIDUM: CPT

## 2024-04-30 PROCEDURE — 76816 OB US FOLLOW-UP PER FETUS: CPT

## 2024-04-30 PROCEDURE — 99212 OFFICE O/P EST SF 10 MIN: CPT | Performed by: REGISTERED NURSE

## 2024-04-30 PROCEDURE — 99207 PR PRENATAL VISIT: CPT | Performed by: REGISTERED NURSE

## 2024-04-30 PROCEDURE — 82306 VITAMIN D 25 HYDROXY: CPT

## 2024-04-30 PROCEDURE — 76816 OB US FOLLOW-UP PER FETUS: CPT | Mod: 26 | Performed by: OBSTETRICS & GYNECOLOGY

## 2024-04-30 NOTE — NURSING NOTE
Patient reports positive fetal movement, no pain, denies contractions, leaking of fluid, or bleeding.  Patient denies headache, visual changes, nausea/vomiting, epigastric pain related to preeclampsia.  Education provided to patient on ultrasound.  SBAR given to AZAM EVANGELISTA, see their note in Epic.

## 2024-04-30 NOTE — PROGRESS NOTES
"28 year old  at 28w0d presentst for a routine prenatal appointment.    Denies vaginal bleeding or leakage of fluid.  Denies contractions. Reports regular fetal movement.       No HA, visual changes, RUQ or epigastric pain.   Patient concerns:   -Recovering from a cold. Has taken mucinex and tylenol which has helped.     Objective:  Vitals:    24 1511   Height: 1.7 m (5' 6.93\")     See OB flowsheet    Education completed today includes breast feeding, [facility] hand out, contraception, counting movements, signs of pre-term labor, when to present to birthplace, post partum depression, and getting enough iron.  Birth preferences reviewed: Ideally Un-Medicated but open to medication if needed.   Labor support:  Tunde boyfriend.     Feeding plans :    Contraception planned:  unsure, will review options further at home  The following labs were ordered today:   GCT, CBC w platelets, Vitamin d, Anti-treponema   Water birth consent form was not given. Not interested.   Blood type: A POS    Antibody Screen   Date Value Ref Range Status   2024 Negative Negative Final   Rhogam  was not given. Not indicated.   TDAP was given.  A/P:  Encounter Diagnosis   Name Primary?    HRP (high risk pregnancy), unspecified trimester Yes     Orders Placed This Encounter   Procedures    US OB Biophys Single Gestation Measure    US OB Fetal Biophys Prf wo NonStrs Singls Sgl    US OB Fetal Biophys Prf wo NonStrs Singls Sgl    US OB Fetal Biophys Prf wo NonStrs Singls Sgl    US OB Fetal Biophys Prf wo NonStrs Singls Sgl    CBC with platelets    25- OH-Vitamin D    Glucose 1 Hour    Treponema Abs w Reflex to RPR and Titer     - Reviewed growth US done today: EFW 17%, MVP 5.4 cm, placenta anterior no previa,   - Reviewed safe medications that can be taken for cough/cold/congestion in pregnancy such as robitussin and benadryl. Also encouraged warm shower to help drain sinuses.   -Order placed for growth US " at 34 weeks continued by weekly BPP's starting at 34 weeks.   - Continue scheduled prenatal care. RTC 2  weeks.     I, Christopher Shepard, am serving as a scribe; to document services personally performed by Isa Macias CNM based on data collection and the provider's statements to me.     BLANCA Brewer    I agree with the PFSH and ROS as completed by Christopher Shepard except for changes made by me. The remainder of the encounter was performed by me and scribed by Christopher Shepard. The scribed note accurately reflects my personal services and decisions made by me.     ERIC Cedeno CNM

## 2024-04-30 NOTE — PROGRESS NOTES
Please refer to ultrasound report under 'Imaging' Studies of 'Chart Review' tabs.    Jerzy Moura M.D.

## 2024-05-01 LAB
T PALLIDUM AB SER QL: NONREACTIVE
VIT D+METAB SERPL-MCNC: 38 NG/ML (ref 20–50)

## 2024-05-14 ENCOUNTER — PRENATAL OFFICE VISIT (OUTPATIENT)
Dept: OBGYN | Facility: CLINIC | Age: 29
End: 2024-05-14
Attending: ADVANCED PRACTICE MIDWIFE
Payer: COMMERCIAL

## 2024-05-14 VITALS
HEART RATE: 69 BPM | DIASTOLIC BLOOD PRESSURE: 69 MMHG | HEIGHT: 67 IN | SYSTOLIC BLOOD PRESSURE: 100 MMHG | BODY MASS INDEX: 45.99 KG/M2 | WEIGHT: 293 LBS

## 2024-05-14 DIAGNOSIS — O09.93 HRP (HIGH RISK PREGNANCY), THIRD TRIMESTER: Primary | ICD-10-CM

## 2024-05-14 PROCEDURE — 99207 PR PRENATAL VISIT: CPT | Performed by: ADVANCED PRACTICE MIDWIFE

## 2024-05-14 PROCEDURE — 99213 OFFICE O/P EST LOW 20 MIN: CPT | Performed by: ADVANCED PRACTICE MIDWIFE

## 2024-05-14 ASSESSMENT — PAIN SCALES - GENERAL: PAINLEVEL: MILD PAIN (2)

## 2024-05-14 NOTE — PATIENT INSTRUCTIONS
Thank you for trusting us with your care!     If you need to contact us for questions about:  Symptoms, Scheduling & Medical Questions; Non-urgent (2-3 day response) [employer] message, Urgent (needing response today) [phone] (if after 3:30pm next day response)   Prescriptions: Please call your Pharmacy   Billing: Ciara [phone] or [facility] Physicians:[phone]

## 2024-05-14 NOTE — PROGRESS NOTES
"Subjective:      28 year old  at 30w0d presentst for a routine prenatal appointment.    Denies vaginal bleeding or leakage of fluid.  Occ BH, denies concerning contractions or cramping.   Reports regular fetal movement.       No HA, visual changes, RUQ or epigastric pain.    Patient concerns:   - Has noticed increased hip pain, as well as pubic symphysis pain.  Pain is worse with activity, sitting to standing, rolling over in bed.  Has not tried much for relief.      - normal EOB labs    Objective:  Vitals:    24 1026   BP: 100/69   Pulse: 69   Weight: 135.6 kg (299 lb)   Height: 1.7 m (5' 6.93\")   See ob flowsheet    Assessment/Plan:   Patient Active Problem List    Diagnosis Date Noted    Need for immunization against rubella 2024     Priority: Medium    Maternal varicella, non-immune 2024     Priority: Medium    Vitamin D deficiency 2024     Priority: Medium     Noted to be 24 at initial OB visit    3/3: taking supplementation      HRP (high risk pregnancy), unspecified trimester 2024     Priority: Medium     [facility] Provider Group choice: CNM group  Partner's name: Tunde  [x]NOB folder  [x]Dating  [x] 1st trimester screening: MFM referral placed for 1st trimester screening place  [x]Offer AFP after 15 wks  [x]Fetal anatomy US ordered  [x]Rubella NON-immune, accepts vaccination postpartum  [x]Hep B immune   [x]Varicella NON-immune, accepts vaccination pp  [x]Pap collected at initial OB visit.  [x] recommended LD ASA after 12 wks for PRE-E risk  [x] GDM risk, recommended early GCT and hgb A1C  [x]No need for utox in labor  [x]COVID vaccine completed  _____________________________________  []EOB folder  []PP Contraception plan: If tubal,consent date:  []Labor plans:  []:  []Infant feeding plan  [x]FLU shot  []TDAP   []RSV  []Rhogam if needed, date:  []TOLAC consent done  [] Water birth interest  []GCT, " passed  ________________________________________  [] OTC PP meds sent  []PP plans:  []Planning CS-ERAS pkt       Prepregnancy BMI 43- early 1hr Growth 28, 34, weekly BPP 34 weeks IOL 39 weeks 2023     Priority: Medium     Maternal and fetal risks associated with an elevated BMI (?40) were discussed including: diabetes, hypertension,  birth (related to medical recommendations for early delivery), prolonged pregnancy (going past 42 weeks), obstructive sleep apnea, induction and induction failure, longer labor, difficult anesthesia,  delivery, macrosomia (increasing the risk for dysfunctional labor, vacuum assisted birth, tearing, excessive bleeding), blood clots, hemorrhage, infection after birth, prolonged hospitalization, postpartum depression, congenital anomalies, early pregnancy loss, and stillbirth.     We discussed midwifery led care and how this can improve satisfaction and success with her pregnancy and birth such as childbirth education and preparation, support during labor (increased mobility, upright positions, restorative hydration and eating, and support for low-intervention birth practices as appropriate, such as hydrotherapy or nitrous oxide for pain management to increase mobility, avoiding repeated exams and early release of benavidez, and respectful birth environments to increase hormones and trust in the process.    To help ensure normal growth and well being for the baby, the patient was also counseled on our usual recommendations for care for those with BMI ?40:  An early 1st trimester ultrasound  Nutrition consult with our clinic staff  A Level II US at mid pregnancy  Ultrasounds to monitor the baby's growth at the end of pregnancy, every 4-6 weeks  Monitoring during pregnancy including biophysical profile ultrasound and nonstress test heart rate monitoring weekly beginning at 34 weeks  An earlier delivery in the 39th week of pregnancy   Internal monitors during labor and  delivery to better assess contractions and fetal heart rate if needed  A consultation with our anesthesia team if choosing an epidural  Uterotonics (medication to decrease risk of severe hemorrhage) at the time of delivery  Preventative medication for blood clots after birth  Screening for diabetes after pregnancy      2024:  - growth at 28 and 34 weeks  followed by weekly  testing starting at 34 weeks due to maternal BMI >40.           Discussed relief measures for pelvic pain, including maternity belt, chiropractic care, etc. Reviewed option for PT if needed.  Updated individualized prenatal care plan on the problem list for 3rd trimester  Repeat ultrasound scheduled 2024  Return to clinic in 2 weeks and prn if questions or concerns.     ERIC Baker CNM

## 2024-05-29 ENCOUNTER — MYC MEDICAL ADVICE (OUTPATIENT)
Dept: OBGYN | Facility: CLINIC | Age: 29
End: 2024-05-29

## 2024-05-29 NOTE — TELEPHONE ENCOUNTER
Pt might not be able to make it to her appt today, was inquiring about other appt times. Writer gave pt clinic number to call to help with appts

## 2024-05-30 ENCOUNTER — PRENATAL OFFICE VISIT (OUTPATIENT)
Dept: OBGYN | Facility: CLINIC | Age: 29
End: 2024-05-30
Attending: REGISTERED NURSE
Payer: COMMERCIAL

## 2024-05-30 VITALS
SYSTOLIC BLOOD PRESSURE: 124 MMHG | HEIGHT: 67 IN | HEART RATE: 77 BPM | DIASTOLIC BLOOD PRESSURE: 77 MMHG | WEIGHT: 293 LBS | BODY MASS INDEX: 45.99 KG/M2

## 2024-05-30 DIAGNOSIS — E66.01 CLASS 3 SEVERE OBESITY WITHOUT SERIOUS COMORBIDITY WITH BODY MASS INDEX (BMI) OF 40.0 TO 44.9 IN ADULT, UNSPECIFIED OBESITY TYPE (H): Primary | ICD-10-CM

## 2024-05-30 DIAGNOSIS — E66.813 CLASS 3 SEVERE OBESITY WITHOUT SERIOUS COMORBIDITY WITH BODY MASS INDEX (BMI) OF 40.0 TO 44.9 IN ADULT, UNSPECIFIED OBESITY TYPE (H): Primary | ICD-10-CM

## 2024-05-30 DIAGNOSIS — O09.90 HRP (HIGH RISK PREGNANCY), UNSPECIFIED TRIMESTER: ICD-10-CM

## 2024-05-30 PROCEDURE — 99207 PR PRENATAL VISIT: CPT | Performed by: REGISTERED NURSE

## 2024-05-30 PROCEDURE — 99213 OFFICE O/P EST LOW 20 MIN: CPT | Performed by: REGISTERED NURSE

## 2024-05-30 RX ORDER — FAMOTIDINE 20 MG/1
20 TABLET, FILM COATED ORAL 2 TIMES DAILY
Qty: 90 TABLET | Refills: 0 | Status: SHIPPED | OUTPATIENT
Start: 2024-05-30 | End: 2024-08-08

## 2024-05-30 NOTE — PATIENT INSTRUCTIONS
"Weeks 32 to 34 of Your Pregnancy: Care Instructions    Decide whether you want to bank or donate your baby's umbilical cord blood. If you want to save this blood, you have to arrange for it ahead of time.    Decide about circumcision. Personal, Jew, or cultural beliefs may play a role in your decision. You get to decide what you want for your baby.    Learn how to ease hemorrhoids.    Get more liquids, fruits, vegetables, and fiber in your diet.  Avoid sitting for too long.  Clean yourself with moist toilet paper. Or try witch hazel pads.  Try ice packs or warm sitz baths for discomfort.  Use hydrocortisone cream for pain or itching.  Ask your doctor about stool softeners.    Consider the benefits of breastfeeding.    It reduces your baby's risk of sudden infant death syndrome (SIDS).   babies are less likely to get certain infections. And they're less likely to be obese or get diabetes later in life.  It can lower your risk of breast and ovarian cancers and osteoporosis.  It saves you money.  Follow-up care is a key part of your treatment and safety. Be sure to make and go to all appointments, and call your doctor if you are having problems. It's also a good idea to know your test results and keep a list of the medicines you take.  Where can you learn more?  Go to https://www.[employer].net/patiented  Enter X711 in the search box to learn more about \"Weeks 32 to 34 of Your Pregnancy: Care Instructions.\"  Current as of: July 10, 2023               Content Version: 14.0    [id number] [employer].   Care instructions adapted under license by your healthcare professional. If you have questions about a medical condition or this instruction, always ask your healthcare professional. [employer], [employer] disclaims any warranty or liability for your use of this information.      "

## 2024-05-30 NOTE — PROGRESS NOTES
"Subjective:      28 year old  at 32w2d presentst for a routine prenatal appointment.    No vaginal bleeding or leakage of fluid. Minimal, random contractions and cramping.   Reports regular fetal movement.       No HA, visual changes, RUQ or epigastric pain.    Rec'd tdap 2024      Patient concerns: Endorses significant indigestion with an episode of vomiting yesterday. TUMS provide little relief. Willing to try Pepcid prescription.     Objective:  Vitals:    24 1501   BP: 124/77   Pulse: 77   Weight: 138.5 kg (305 lb 4.8 oz)   Height: 1.7 m (5' 6.93\")   , see ob flowsheet    Blood type: A POS   Assessment/Plan     Patient Active Problem List    Diagnosis Date Noted    Need for immunization against rubella 2024     Priority: Medium    Maternal varicella, non-immune 2024     Priority: Medium    Vitamin D deficiency 2024     Priority: Medium     Noted to be 24 at initial OB visit    3/3: taking supplementation      HRP (high risk pregnancy), unspecified trimester 2024     Priority: Medium     [facility] Patient Provider Group choice: CNM group  Partner's name: Tunde  [x]NOB folder  [x]Dating  [x] 1st trimester screening: MFM referral placed for 1st trimester screening place  [x]Offer AFP after 15 wks  [x]Fetal anatomy US ordered  [x]Rubella NON-immune, accepts vaccination postpartum  [x]Hep B immune   [x]Varicella NON-immune, accepts vaccination pp  [x]Pap collected at initial OB visit.  [x] recommended LD ASA after 12 wks for PRE-E risk  [x] GDM risk, recommended early GCT and hgb A1C  [x]No need for utox in labor  [x]COVID vaccine completed  _____________________________________  [x]EOB folder  [x]PP Contraception plan: undecided  [x]Labor plans: unmedicated, but open  []:  [x]Infant feeding plan: breast  [x]FLU shot  [x]TDAP   []RSV  [x]GCT  ________________________________________  [] OTC PP meds sent  []PP plans:  []Planning CS-ERAS pkt       Prepregnancy " BMI 43- early 1hr Growth 28, 34, weekly BPP 34 weeks IOL 39 weeks 2023     Priority: Medium     Maternal and fetal risks associated with an elevated BMI (?40) were discussed including: diabetes, hypertension,  birth (related to medical recommendations for early delivery), prolonged pregnancy (going past 42 weeks), obstructive sleep apnea, induction and induction failure, longer labor, difficult anesthesia,  delivery, macrosomia (increasing the risk for dysfunctional labor, vacuum assisted birth, tearing, excessive bleeding), blood clots, hemorrhage, infection after birth, prolonged hospitalization, postpartum depression, congenital anomalies, early pregnancy loss, and stillbirth.     We discussed midwifery led care and how this can improve satisfaction and success with her pregnancy and birth such as childbirth education and preparation, support during labor (increased mobility, upright positions, restorative hydration and eating, and support for low-intervention birth practices as appropriate, such as hydrotherapy or nitrous oxide for pain management to increase mobility, avoiding repeated exams and early release of benavidez, and respectful birth environments to increase hormones and trust in the process.    To help ensure normal growth and well being for the baby, the patient was also counseled on our usual recommendations for care for those with BMI ?40:  An early 1st trimester ultrasound  Nutrition consult with our clinic staff  A Level II US at mid pregnancy  Ultrasounds to monitor the baby's growth at the end of pregnancy, every 4-6 weeks  Monitoring during pregnancy including biophysical profile ultrasound and nonstress test heart rate monitoring weekly beginning at 34 weeks  An earlier delivery in the 39th week of pregnancy   Internal monitors during labor and delivery to better assess contractions and fetal heart rate if needed  A consultation with our anesthesia team if choosing an  epidural  Uterotonics (medication to decrease risk of severe hemorrhage) at the time of delivery  Preventative medication for blood clots after birth  Screening for diabetes after pregnancy      2024:  - growth at 28 and 34 weeks  followed by weekly  testing starting at 34 weeks due to maternal BMI >40.         No orders of the defined types were placed in this encounter.    - RX sent for pepcid BID.     Updated individualized prenatal care plan on the problem list for 3rd trimester.     Return to clinic in 2 weeks and prn if questions or concerns.     I, Shirin Del Rio, am serving as a scribe; to document services personally performed by ERIC Henson CNM based on data collection and the provider's statements to me.     Shirin Del Rio    I agree with the PFSH and ROS as completed by Shirin Del Rio except for changes made by me. The remainder of the encounter was performed by me and scribed by Shirin Del Rio. The scribed note accurately reflects my personal services and decisions made by me.     ERIC Cedeno CNM

## 2024-06-10 NOTE — PROGRESS NOTES
Subjective:      28 year old  at 34w0d presents for a routine prenatal appointment.      Denies cramping/contractions, vaginal bleeding, discharge or leakage of fluid. Report +fetal movement.  No HA, vision changes, ruq/epigastric pain.     Patient concerns: Feeling well overall.   Growth ultrasound and BPP today d/t pre-preg BMI >40: BPP 8/8, EFW 17.8%tile, cephalic, mvp wnl.    Pt inquiring about recommendations for IOL. Prefers spontaneous labor. Would consider IOL if undelivered by MICHEAL.    Objective:  Vitals:    24 1409   BP: 106/72   Pulse: 78   Weight: 138.6 kg (305 lb 9.6 oz)     See ob flowsheet    Ultrasound:  Anatomy Scan:   Estrada gestation.   Visualized: 4 Chamber Heart, Stomach, Kidneys, and Bladder.     Biometry:   BPD 8.6 cm 34w3d 61.5%   HC 30.9 cm 34w3d 25.0%   AC 28.6 cm 32w4d 15.8%   FL 6.3 cm 32w4d 11.0%   EFW (lbs/oz) 4 lbs 10ozs     EFW (g) 2086 g 17.8%      Fetal heart rate: 141 bpm   Fetal presentation: Cephalic   Amniotic fluid: 4.4cm MVP   Placenta: Anterior, no previa, > 2 cm from internal os     Maternal Anatomy:   Right adnexa: Normal   Left adnexa: Normal    Biophysical Profile:   Fetal body movements: Normal (2)   Fetal tone: Normal (2)   Fetal breathing movements: Normal (2)   Amniotic fluid volume: Normal (2)   BPP Score: 8/8     Technique:Transabdominal Imaging performed     Impression:   Cephalic fetal presentation with appropriate growth for gestational age.   Normal amniotic fluid.   BPP 8/8    Assessment/Plan     Patient Active Problem List    Diagnosis Date Noted    Need for immunization against rubella 2024     Priority: Medium    Maternal varicella, non-immune 2024     Priority: Medium    Vitamin D deficiency 2024     Priority: Medium     Noted to be 23 at initial OB visit    3/3: taking supplementation      HRP (high risk pregnancy), unspecified trimester 2024     Priority: Medium     [facility]  choice: CNM group  Partner's name: Tunde  [x]NOB folder  [x]Dating  [x] 1st trimester screening: MFM referral placed for 1st trimester screening place  [x]Offer AFP after 15 wks  [x]Fetal anatomy US ordered  [x]Rubella NON-immune, accepts vaccination postpartum  [x]Hep B immune   [x]Varicella NON-immune, accepts vaccination pp  [x]Pap collected at initial OB visit.  [x] recommended LD ASA after 12 wks for PRE-E risk  [x] GDM risk, recommended early GCT and hgb A1C  [x]No need for utox in labor  [x]COVID vaccine completed  _____________________________________  [x]EOB folder  [x]PP Contraception plan: undecided  [x]Labor plans: unmedicated, but open  []:  [x]Infant feeding plan: breast  [x]FLU shot  [x]TDAP   []RSV  [x]GCT  ________________________________________  [] OTC PP meds sent  []PP plans:  []Planning CS-ERAS pkt       Prepregnancy BMI 43- early 1hr Growth 28, 34, weekly BPP 34 weeks IOL 39-39+6 2023     Priority: Medium     : prefers spontaneous, iol if gets to melanie,  EFW 17.8%tile    Maternal and fetal risks associated with an elevated BMI (?40) were discussed including: diabetes, hypertension,  birth (related to medical recommendations for early delivery), prolonged pregnancy (going past 42 weeks), obstructive sleep apnea, induction and induction failure, longer labor, difficult anesthesia,  delivery, macrosomia (increasing the risk for dysfunctional labor, vacuum assisted birth, tearing, excessive bleeding), blood clots, hemorrhage, infection after birth, prolonged hospitalization, postpartum depression, congenital anomalies, early pregnancy loss, and stillbirth.     We discussed midwifery led care and how this can improve satisfaction and success with her pregnancy and birth such as childbirth education and preparation, support during labor (increased mobility, upright positions, restorative hydration and eating, and support for low-intervention birth practices as appropriate,  such as hydrotherapy or nitrous oxide for pain management to increase mobility, avoiding repeated exams and early release of benavidez, and respectful birth environments to increase hormones and trust in the process.    To help ensure normal growth and well being for the baby, the patient was also counseled on our usual recommendations for care for those with BMI ?40:  An early 1st trimester ultrasound  Nutrition consult with our clinic staff  A Level II US at mid pregnancy  Ultrasounds to monitor the baby's growth at the end of pregnancy, every 4-6 weeks  Monitoring during pregnancy including biophysical profile ultrasound and nonstress test heart rate monitoring weekly beginning at 34 weeks  An earlier delivery in the 39th week of pregnancy   Internal monitors during labor and delivery to better assess contractions and fetal heart rate if needed  A consultation with our anesthesia team if choosing an epidural  Uterotonics (medication to decrease risk of severe hemorrhage) at the time of delivery  Preventative medication for blood clots after birth  Screening for diabetes after pregnancy      2024:  - growth at 28 and 34 weeks  followed by weekly  testing starting at 34 weeks due to maternal BMI >40.         No orders of the defined types were placed in this encounter.      Updated individualized prenatal care plan on the problem list for 3rd trimester    - Reviewed normal growth ultrasound and BPP.   Continue weekly BPPs starting at 34 weeks due to maternal BMI >40.   - Discussed IOL recommendation 39-39+6. Prefers spontaneous, consider IOL if undelivered by MICHEAL. 2x/week BPP if >39+6.   - Plan GBS and CBC with plts at 36-37 weeks.    Return to clinic in 1 week for BPP and CARLOS and prn if questions or concerns.     Lynsey Blunt, ERIC, SELAM

## 2024-06-11 ENCOUNTER — PRENATAL OFFICE VISIT (OUTPATIENT)
Dept: OBGYN | Facility: CLINIC | Age: 29
End: 2024-06-11
Attending: REGISTERED NURSE
Payer: COMMERCIAL

## 2024-06-11 ENCOUNTER — ANCILLARY PROCEDURE (OUTPATIENT)
Dept: ULTRASOUND IMAGING | Facility: CLINIC | Age: 29
End: 2024-06-11
Attending: REGISTERED NURSE
Payer: COMMERCIAL

## 2024-06-11 VITALS
WEIGHT: 293 LBS | SYSTOLIC BLOOD PRESSURE: 106 MMHG | HEART RATE: 78 BPM | DIASTOLIC BLOOD PRESSURE: 72 MMHG | BODY MASS INDEX: 47.96 KG/M2

## 2024-06-11 DIAGNOSIS — E66.813 CLASS 3 SEVERE OBESITY WITHOUT SERIOUS COMORBIDITY WITH BODY MASS INDEX (BMI) OF 40.0 TO 44.9 IN ADULT, UNSPECIFIED OBESITY TYPE (H): ICD-10-CM

## 2024-06-11 DIAGNOSIS — O09.90 HRP (HIGH RISK PREGNANCY), UNSPECIFIED TRIMESTER: Primary | ICD-10-CM

## 2024-06-11 DIAGNOSIS — E66.01 CLASS 3 SEVERE OBESITY WITHOUT SERIOUS COMORBIDITY WITH BODY MASS INDEX (BMI) OF 40.0 TO 44.9 IN ADULT, UNSPECIFIED OBESITY TYPE (H): ICD-10-CM

## 2024-06-11 DIAGNOSIS — O09.90 HRP (HIGH RISK PREGNANCY), UNSPECIFIED TRIMESTER: ICD-10-CM

## 2024-06-11 PROCEDURE — 99207 PR PRENATAL VISIT: CPT | Performed by: ADVANCED PRACTICE MIDWIFE

## 2024-06-11 PROCEDURE — 76816 OB US FOLLOW-UP PER FETUS: CPT

## 2024-06-11 PROCEDURE — 99211 OFF/OP EST MAY X REQ PHY/QHP: CPT | Mod: 25 | Performed by: ADVANCED PRACTICE MIDWIFE

## 2024-06-11 PROCEDURE — 76819 FETAL BIOPHYS PROFIL W/O NST: CPT | Mod: 26 | Performed by: STUDENT IN AN ORGANIZED HEALTH CARE EDUCATION/TRAINING PROGRAM

## 2024-06-11 PROCEDURE — 76816 OB US FOLLOW-UP PER FETUS: CPT | Mod: 26 | Performed by: STUDENT IN AN ORGANIZED HEALTH CARE EDUCATION/TRAINING PROGRAM

## 2024-06-11 PROCEDURE — 76819 FETAL BIOPHYS PROFIL W/O NST: CPT

## 2024-06-11 NOTE — PATIENT INSTRUCTIONS
Thank you for trusting us with your care!     If you need to contact us for questions about:  Symptoms, Scheduling & Medical Questions; Non-urgent (2-3 day response) Inna message, Urgent (needing response today) [phone] (if after 3:30pm next day response)   Prescriptions: Please call your Pharmacy   Billing: [facility] [phone] or [facility]:[phone]

## 2024-06-17 NOTE — PATIENT INSTRUCTIONS
Thank you for trusting us with your care!     If you need to contact us for questions about:  Symptoms, Scheduling & Medical Questions; Non-urgent (2-3 day response) [employer] message, Urgent (needing response today) [phone] (if after 3:30pm next day response)   Prescriptions: Please call your Pharmacy   Billing: [facility] [phone] or [facility]:[phone]    Weeks 34 to 36 of Your Pregnancy: Care Instructions  Your belly has grown quite large. It's almost time to give birth! Your baby's lungs are almost ready to breathe air. The skull bones are firm enough to protect your baby's head. But they're soft enough to move down through the birth canal.    You might be wondering what to expect during labor. Because each birth is different, there's no way to know exactly what childbirth will be like for you. Talk to your doctor or midwife about any concerns you have.    You'll probably have a test for group B streptococcus (GBS). GBS is bacteria that can live in the vagina and rectum. GBS can make your baby sick after birth. If you test positive, you'll get antibiotics during labor.    Choose what type of pain relief you would like during labor.  You can choose from a few types, including medicine and non-medicine options. You may want to use several types of pain relief.     Know how medicines can help with pain during labor.  Some medicines lower anxiety and help with some of the pain. Others make your lower body numb so that you will feel less pain.     Tell your doctor about your pain medicine choice.  Do this before you start labor or very early in your labor. You may be able to change your mind during labor.     Learn about the stages of labor.    The first stage includes the early (latent) and active phases of labor. Contractions start in early labor. During active labor, contractions get stronger, last longer, and happen more often. And the cervix opens more rapidly.  The second stage starts when you're  "ready to push. During this stage, your baby is born.  During the third stage, you'll usually have a few more contractions to push out the placenta.   Follow-up care is a key part of your treatment and safety. Be sure to make and go to all appointments, and call your doctor if you are having problems. It's also a good idea to know your test results and keep a list of the medicines you take.  Where can you learn more?  Go to https://www.Scoupon.net/patiented  Enter B912 in the search box to learn more about \"Weeks 34 to 36 of Your Pregnancy: Care Instructions.\"  Current as of: July 10, 2023               Content Version: 14.0    9522-1738 [employer].   Care instructions adapted under license by your healthcare professional. If you have questions about a medical condition or this instruction, always ask your healthcare professional. [employer] disclaims any warranty or liability for your use of this information.      Group B Strep During Pregnancy: Care Instructions  Overview     Group B strep infection is caused by a type of bacteria. It's a different kind of bacteria than the kind that causes strep throat.  You may have this kind of bacteria in your body. Sometimes it may cause an infection, but most of the time it doesn't make you sick or cause symptoms. But if you pass the bacteria to your baby during the birth, it can cause serious health problems for your baby.  If you have this bacteria in your body, you will get antibiotics when you are in labor. Antibiotics help prevent problems for a  baby.  After birth, doctors will watch and may test your baby. If your baby tests positive for Group B strep, your baby will get antibiotics.  If you plan to breastfeed your baby, don't worry. It will be safe to breastfeed.  Follow-up care is a key part of your treatment and safety. Be sure to make and go to all appointments, and call your doctor if you are having problems. It's also a good " "idea to know your test results and keep a list of the medicines you take.  How can you care for yourself at home?  If your doctor has prescribed antibiotics, take them as directed. Do not stop taking them just because you feel better. You need to take the full course of antibiotics.  Tell your doctor if you are allergic to any antibiotic.  If you go into labor, or your water breaks, go to the hospital. Your doctor will give you antibiotics to help protect your baby from infection.  Tell the doctors and nurses if you have an allergy to penicillin.  Tell the doctors and nurses at the hospital that you tested positive for group B strep.  When should you call for help?   Call your doctor now or seek immediate medical care if:    You have symptoms of a urinary tract infection. These may include:  Pain or burning when you urinate.  A frequent need to urinate without being able to pass much urine.  Pain in the flank, which is just below the rib cage and above the waist on either side of the back.  Blood in your urine.  A fever.     You think you are in labor or your water has broken.     You have pain in your belly or pelvis.   Watch closely for changes in your health, and be sure to contact your doctor if you have any problems.  Where can you learn more?  Go to https://www.iList.net/patiented  Enter M001 in the search box to learn more about \"Group B Strep During Pregnancy: Care Instructions.\"  Current as of: June 12, 2023               Content Version: 14.0    1870-7374 [employer].   Care instructions adapted under license by your healthcare professional. If you have questions about a medical condition or this instruction, always ask your healthcare professional. [employer] disclaims any warranty or liability for your use of this information.      Circumcision in Infants: What to Expect at Home  Your Child's Recovery  After circumcision, your baby's penis may look red and swollen. It may " have petroleum jelly and gauze on it. The gauze will likely come off when your baby urinates. Follow your doctor's directions about whether to put clean gauze back on your baby's penis or to leave the gauze off. If you need to remove gauze from the penis, use warm water to soak the gauze and gently loosen it.  The doctor may have used a Plastibell device to do the circumcision. If so, your baby will have a plastic ring around the head of the penis. The ring should fall off by itself in 10 to 12 days.  A thin, yellow film may form over the area the day after the procedure. This is part of the normal healing process. It should go away in a few days.  Your baby may seem fussy while the area heals. It may hurt for your baby to urinate. This pain often gets better in 3 or 4 days. But it may last for up to 2 weeks.  Even though your baby's penis will likely start to feel better after 3 or 4 days, it may look worse. The penis often starts to look like it's getting better after about 7 to 10 days.  This care sheet gives you a general idea about how long it will take for your child to recover. But each child recovers at a different pace. Follow the steps below to help your child get better as quickly as possible.  How can you care for your child at home?  Activity    Let your baby rest as much as possible. Sleeping will help with recovery.     You can give your baby a sponge bath the day after surgery. Ask your doctor when it is okay to give your baby a bath.   Medicines    Your doctor will tell you if and when your child can restart any medicines. The doctor will also give you instructions about your child taking any new medicines.     Your doctor may recommend giving your baby acetaminophen (Tylenol) to help with pain after the procedure. Be safe with medicines. Give your child medicines exactly as prescribed. Call your doctor if you think your child is having a problem with a medicine.     Do not give your child two or  more pain medicines at the same time unless the doctor told you to. Many pain medicines have acetaminophen, which is Tylenol. Too much acetaminophen (Tylenol) can be harmful.   Circumcision care    Always wash your hands before and after touching the circumcision area.     Gently wash your baby's penis with plain, warm water after each diaper change, and pat it dry. Do not use soap. Don't use hydrogen peroxide or alcohol. They can slow healing.     Do not try to remove the film that forms on the penis. The film will go away on its own.     Put plenty of petroleum jelly (such as Vaseline) on the circumcision area during each diaper change. This will prevent your baby's penis from sticking to the diaper while it heals.     Fasten your baby's diapers loosely so that there is less pressure on the penis while it heals.   Follow-up care is a key part of your child's treatment and safety. Be sure to make and go to all appointments, and call your doctor if your child is having problems. It's also a good idea to know your child's test results and keep a list of the medicines your child takes.  When should you call for help?   Call your doctor now or seek immediate medical care if:    Your baby has a fever over 100.4 F.     Your baby is extremely fussy or irritable, has a high-pitched cry, or refuses to eat.     Your baby does not have a wet diaper within 12 hours after the circumcision.     You find a spot of bleeding larger than a 2-inch [place] from the incision.     Your baby has signs of infection. Signs may include severe swelling; redness; a red streak on the shaft of the penis; or a thick, yellow discharge.   Watch closely for changes in your child's health, and be sure to contact your doctor if:    A Plastibell device was used for the circumcision and the ring has not fallen off after 10 to 12 days.   Where can you learn more?  Go to https://www.healthwise.net/patiented  Enter S250 in the search box to learn more  "about \"Circumcision in Infants: What to Expect at Home.\"  Current as of: October 24, 2023               Content Version: 14.0    6067-6761 [employer].   Care instructions adapted under license by your healthcare professional. If you have questions about a medical condition or this instruction, always ask your healthcare professional. [employer] disclaims any warranty or liability for your use of this information.      "

## 2024-06-18 ENCOUNTER — PRENATAL OFFICE VISIT (OUTPATIENT)
Dept: OBGYN | Facility: CLINIC | Age: 29
End: 2024-06-18
Attending: REGISTERED NURSE
Payer: COMMERCIAL

## 2024-06-18 ENCOUNTER — ANCILLARY PROCEDURE (OUTPATIENT)
Dept: ULTRASOUND IMAGING | Facility: CLINIC | Age: 29
End: 2024-06-18
Attending: REGISTERED NURSE
Payer: COMMERCIAL

## 2024-06-18 VITALS
HEIGHT: 67 IN | SYSTOLIC BLOOD PRESSURE: 113 MMHG | WEIGHT: 293 LBS | HEART RATE: 74 BPM | DIASTOLIC BLOOD PRESSURE: 77 MMHG | BODY MASS INDEX: 45.99 KG/M2

## 2024-06-18 DIAGNOSIS — E66.01 CLASS 3 SEVERE OBESITY DUE TO EXCESS CALORIES WITHOUT SERIOUS COMORBIDITY WITH BODY MASS INDEX (BMI) OF 40.0 TO 44.9 IN ADULT (H): ICD-10-CM

## 2024-06-18 DIAGNOSIS — E66.813 CLASS 3 SEVERE OBESITY DUE TO EXCESS CALORIES WITHOUT SERIOUS COMORBIDITY WITH BODY MASS INDEX (BMI) OF 40.0 TO 44.9 IN ADULT (H): ICD-10-CM

## 2024-06-18 DIAGNOSIS — O09.90 HRP (HIGH RISK PREGNANCY), UNSPECIFIED TRIMESTER: Primary | ICD-10-CM

## 2024-06-18 DIAGNOSIS — O09.90 HRP (HIGH RISK PREGNANCY), UNSPECIFIED TRIMESTER: ICD-10-CM

## 2024-06-18 PROCEDURE — 99213 OFFICE O/P EST LOW 20 MIN: CPT | Mod: 25 | Performed by: ADVANCED PRACTICE MIDWIFE

## 2024-06-18 PROCEDURE — 99207 PR PRENATAL VISIT: CPT | Performed by: ADVANCED PRACTICE MIDWIFE

## 2024-06-18 PROCEDURE — 76819 FETAL BIOPHYS PROFIL W/O NST: CPT

## 2024-06-18 PROCEDURE — 76819 FETAL BIOPHYS PROFIL W/O NST: CPT | Mod: 26 | Performed by: OBSTETRICS & GYNECOLOGY

## 2024-06-18 NOTE — PROGRESS NOTES
"Subjective:      28 year old  at 35w0d presentst for a routine prenatal appointment.    No vaginal bleeding or leakage of fluid.  Denies concerning contractions or cramping.   Reports regular fetal movement.       No HA, visual changes, RUQ or epigastric pain.    BPP today . Has weekly BPP scheduled    Patient concerns: None    Objective:  Vitals:    24 1353   BP: 113/77   Pulse: 74   Weight: 139.4 kg (307 lb 6.4 oz)   Height: 1.7 m (5' 6.93\")   , see ob flowsheet    Blood type: A POS   Assessment/Plan     Patient Active Problem List    Diagnosis Date Noted    Need for immunization against rubella 2024     Priority: Medium    Maternal varicella, non-immune 2024     Priority: Medium    Vitamin D deficiency 2024     Priority: Medium     Noted to be 24 at initial OB visit    3/3: taking supplementation      HRP (high risk pregnancy), unspecified trimester 2024     Priority: Medium     [facility] choice: CNM group  Partner's name: Tunde  [x]NOB folder  [x]Dating  [x] 1st trimester screening: MFM referral placed for 1st trimester screening place  [x]Offer AFP after 15 wks  [x]Fetal anatomy US ordered  [x]Rubella NON-immune, accepts vaccination postpartum  [x]Hep B immune   [x]Varicella NON-immune, accepts vaccination pp  [x]Pap collected at initial OB visit.  [x] recommended LD ASA after 12 wks for PRE-E risk  [x] GDM risk, recommended early GCT and hgb A1C  [x]No need for utox in labor  [x]COVID vaccine completed  _____________________________________  [x]EOB folder  [x]PP Contraception plan: undecided  [x]Labor plans: unmedicated, but open  [x]: interested, resources given  [x]Infant feeding plan: breast  [x]FLU shot  [x]TDAP   []RSV  [x]GCT  ________________________________________  [] OTC PP meds sent  []PP plans:  []Planning CS-ERAS pkt       Prepregnancy BMI 43- early 1hr Growth 28, 34, weekly BPP 34 weeks IOL 39-39+6 2023     " Priority: Medium     : prefers spontaneous, iol if gets to melanie,  EFW 17.8%tile    2024:  - growth at 28 and 34 weeks  followed by weekly  testing starting at 34 weeks due to maternal BMI >40.         No orders of the defined types were placed in this encounter.      Updated individualized prenatal care plan on the problem list for 3rd trimester    Return to clinic in 2 weeks and prn if questions or concerns.     ERIC Presley CNM    I, Shirin Del Rio, am serving as a scribe; to document services personally performed by ERIC Way CNM based on data collection and the provider's statements to me.     Shirin Del Rio  I agree with the PFSH and ROS as completed by the student, except for changes made by me. The remainder of the encounter scribed by the student. The scribed note accurately reflects my personal services and decisions made by me.  Luisa Bauer, MAXIMINO, SELAM, APRN

## 2024-06-25 ENCOUNTER — ANCILLARY PROCEDURE (OUTPATIENT)
Dept: ULTRASOUND IMAGING | Facility: CLINIC | Age: 29
End: 2024-06-25
Attending: REGISTERED NURSE
Payer: COMMERCIAL

## 2024-06-25 ENCOUNTER — PRENATAL OFFICE VISIT (OUTPATIENT)
Dept: OBGYN | Facility: CLINIC | Age: 29
End: 2024-06-25
Attending: REGISTERED NURSE
Payer: COMMERCIAL

## 2024-06-25 VITALS
SYSTOLIC BLOOD PRESSURE: 108 MMHG | DIASTOLIC BLOOD PRESSURE: 74 MMHG | BODY MASS INDEX: 45.99 KG/M2 | HEIGHT: 67 IN | HEART RATE: 84 BPM | WEIGHT: 293 LBS

## 2024-06-25 DIAGNOSIS — O09.90 HRP (HIGH RISK PREGNANCY), UNSPECIFIED TRIMESTER: Primary | ICD-10-CM

## 2024-06-25 DIAGNOSIS — O09.90 HRP (HIGH RISK PREGNANCY), UNSPECIFIED TRIMESTER: ICD-10-CM

## 2024-06-25 PROCEDURE — 76819 FETAL BIOPHYS PROFIL W/O NST: CPT

## 2024-06-25 PROCEDURE — 87653 STREP B DNA AMP PROBE: CPT | Performed by: ADVANCED PRACTICE MIDWIFE

## 2024-06-25 PROCEDURE — 99207 PR PRENATAL VISIT: CPT | Performed by: ADVANCED PRACTICE MIDWIFE

## 2024-06-25 PROCEDURE — 99211 OFF/OP EST MAY X REQ PHY/QHP: CPT | Mod: 25 | Performed by: ADVANCED PRACTICE MIDWIFE

## 2024-06-25 PROCEDURE — 76819 FETAL BIOPHYS PROFIL W/O NST: CPT | Mod: 26 | Performed by: STUDENT IN AN ORGANIZED HEALTH CARE EDUCATION/TRAINING PROGRAM

## 2024-06-25 NOTE — PROGRESS NOTES
"Subjective:     28 year old  at 36w0d presents for a routine prenatal appointment.  No vaginal bleeding or leakage of fluid.  Notes occ cramping, no contractions.   Reports regular fetal movement.       No HA, visual changes, RUQ or epigastric pain.     Patient concerns:      - Questions re: hospital tour, breastfeeding and colostrum collection, parking   - BPP 8/8, cephalic, MVP wnl    Objective:  Vitals:    24 1420   BP: 108/74   Pulse: 84   Weight: 139.2 kg (306 lb 14.4 oz)   Height: 1.7 m (5' 6.93\")   See OB flowsheet    Assessment/Plan:   Patient Active Problem List    Diagnosis Date Noted    Need for immunization against rubella 2024     Priority: Medium    Maternal varicella, non-immune 2024     Priority: Medium    Vitamin D deficiency 2024     Priority: Medium     Noted to be 23 at initial OB visit    3/3: taking supplementation      HRP (high risk pregnancy), unspecified trimester 2024     Priority: Medium     [facility] Provider Group choice: CNM group  Partner's name: Tunde  [x]NOB folder  [x]Dating  [x] 1st trimester screening: MFM referral placed for 1st trimester screening place  [x]Offer AFP after 15 wks  [x]Fetal anatomy US ordered  [x]Rubella NON-immune, accepts vaccination postpartum  [x]Hep B immune   [x]Varicella NON-immune, accepts vaccination pp  [x]Pap collected at initial OB visit.  [x] recommended LD ASA after 12 wks for PRE-E risk  [x] GDM risk, recommended early GCT and hgb A1C  [x]No need for utox in labor  [x]COVID vaccine completed  _____________________________________  [x]EOB folder  [x]PP Contraception plan: undecided  [x]Labor plans: unmedicated, but open  [x]: interested, resources given  [x]Infant feeding plan: breast  [x]FLU shot  [x]TDAP   []RSV  [x]GCT  ________________________________________  [] OTC PP meds sent  []PP plans:  []Planning CS-ERAS pkt       Prepregnancy BMI 43- early 1hr Growth 28, 34, weekly BPP 34 " weeks IOL 39-39+6 2023     Priority: Medium     : prefers spontaneous, iol if gets to melanie,  EFW 17.8%tile    2024:  - growth at 28 and 34 weeks  followed by weekly  testing starting at 34 weeks due to maternal BMI >40.              No data to display                   No data to display              Orders Placed This Encounter   Procedures    Group B strep PCR     Updated individualized prenatal care plan on problem list including offering OTC postpartum meds and PP suppport plans  Labor signs discussed. Reinforced daily fetal movement counts.  Reviewed why/how to contact provider if headache/visual changes/RUQ or epigastric pain, decreased fetal movement, vaginal bleeding, leakage of fluid.  Return to clinic in 1 week and prn if questions or concerns.     ERIC Baker CNM

## 2024-06-26 LAB — GP B STREP DNA SPEC QL NAA+PROBE: NEGATIVE

## 2024-07-01 NOTE — PATIENT INSTRUCTIONS
"Week 37 of Your Pregnancy: Care Instructions    Most babies are born between 37 and 40 weeks.    This is a good time to pack a bag to take with you to the birth. Then it will be ready to go when you are.    Learn about breastfeeding.  For example, find out about ways to hold your baby to make breastfeeding easier. And think about learning how to pump and store milk.     Know that crying is normal.  It's common for babies to cry 1 to 3 hours a day. Some cry more, and some cry less.     Learn why babies cry.  They may be hungry; have gas; need a diaper change; or feel cold, warm, tired, lonely, or tense. Sometimes they cry for unknown reasons.     Think about what will help you stay calm when your baby cries.  Taking slow, deep breaths can help. So can taking a break. It's okay to put your baby somewhere safe (like their crib) and walk away for a few minutes.     Learn about safe sleep for your baby.  Always put your baby to sleep on their back. Place them alone in a crib or bassinet with a firm, flat surface. Keep soft items like stuffed animals out of the crib.     Learn what to expect with  poop.  Your baby will have their own bowel patterns. Some babies have several bowel movements a day. Some have fewer.     Know that  babies will often have loose, yellow bowel movements.  Formula-fed babies have more formed stools. If your baby's poop looks like pellets, your baby is constipated.   Follow-up care is a key part of your treatment and safety. Be sure to make and go to all appointments, and call your doctor if you are having problems. It's also a good idea to know your test results and keep a list of the medicines you take.  Where can you learn more?  Go to https://www.[employer].net/patiented  Enter N257 in the search box to learn more about \"Week 37 of Your Pregnancy: Care Instructions.\"  Current as of: July 10, 2023               Content Version: 14.0    8124-9021 [employer].   Care " instructions adapted under license by your healthcare professional. If you have questions about a medical condition or this instruction, always ask your healthcare professional. [employer] disclaims any warranty or liability for your use of this information.      Week 37 of Your Pregnancy: Care Instructions    Most babies are born between 37 and 40 weeks.    This is a good time to pack a bag to take with you to the birth. Then it will be ready to go when you are.    Learn about breastfeeding.  For example, find out about ways to hold your baby to make breastfeeding easier. And think about learning how to pump and store milk.     Know that crying is normal.  It's common for babies to cry 1 to 3 hours a day. Some cry more, and some cry less.     Learn why babies cry.  They may be hungry; have gas; need a diaper change; or feel cold, warm, tired, lonely, or tense. Sometimes they cry for unknown reasons.     Think about what will help you stay calm when your baby cries.  Taking slow, deep breaths can help. So can taking a break. It's okay to put your baby somewhere safe (like their crib) and walk away for a few minutes.     Learn about safe sleep for your baby.  Always put your baby to sleep on their back. Place them alone in a crib or bassinet with a firm, flat surface. Keep soft items like stuffed animals out of the crib.     Learn what to expect with  poop.  Your baby will have their own bowel patterns. Some babies have several bowel movements a day. Some have fewer.     Know that  babies will often have loose, yellow bowel movements.  Formula-fed babies have more formed stools. If your baby's poop looks like pellets, your baby is constipated.   Follow-up care is a key part of your treatment and safety. Be sure to make and go to all appointments, and call your doctor if you are having problems. It's also a good idea to know your test results and keep a list of the medicines you  "take.  Where can you learn more?  Go to https://www.[employer].net/patiented  Enter N257 in the search box to learn more about \"Week 37 of Your Pregnancy: Care Instructions.\"  Current as of: July 10, 2023               Content Version: 14.0    4496-1685 [employer].   Care instructions adapted under license by your healthcare professional. If you have questions about a medical condition or this instruction, always ask your healthcare professional. [employer] disclaims any warranty or liability for your use of this information.      "

## 2024-07-02 ENCOUNTER — PRENATAL OFFICE VISIT (OUTPATIENT)
Dept: OBGYN | Facility: CLINIC | Age: 29
End: 2024-07-02
Attending: REGISTERED NURSE
Payer: COMMERCIAL

## 2024-07-02 ENCOUNTER — ANCILLARY PROCEDURE (OUTPATIENT)
Dept: ULTRASOUND IMAGING | Facility: CLINIC | Age: 29
End: 2024-07-02
Attending: REGISTERED NURSE
Payer: COMMERCIAL

## 2024-07-02 VITALS
SYSTOLIC BLOOD PRESSURE: 124 MMHG | WEIGHT: 293 LBS | HEART RATE: 72 BPM | BODY MASS INDEX: 45.99 KG/M2 | HEIGHT: 67 IN | DIASTOLIC BLOOD PRESSURE: 84 MMHG

## 2024-07-02 DIAGNOSIS — E66.01 CLASS 3 SEVERE OBESITY WITHOUT SERIOUS COMORBIDITY WITH BODY MASS INDEX (BMI) OF 40.0 TO 44.9 IN ADULT, UNSPECIFIED OBESITY TYPE (H): ICD-10-CM

## 2024-07-02 DIAGNOSIS — E66.813 CLASS 3 SEVERE OBESITY WITHOUT SERIOUS COMORBIDITY WITH BODY MASS INDEX (BMI) OF 40.0 TO 44.9 IN ADULT, UNSPECIFIED OBESITY TYPE (H): ICD-10-CM

## 2024-07-02 DIAGNOSIS — O09.90 HRP (HIGH RISK PREGNANCY), UNSPECIFIED TRIMESTER: Primary | ICD-10-CM

## 2024-07-02 DIAGNOSIS — O09.90 HRP (HIGH RISK PREGNANCY), UNSPECIFIED TRIMESTER: ICD-10-CM

## 2024-07-02 PROCEDURE — 76819 FETAL BIOPHYS PROFIL W/O NST: CPT | Mod: 26 | Performed by: OBSTETRICS & GYNECOLOGY

## 2024-07-02 PROCEDURE — 99207 PR PRENATAL VISIT: CPT | Performed by: ADVANCED PRACTICE MIDWIFE

## 2024-07-02 PROCEDURE — 76819 FETAL BIOPHYS PROFIL W/O NST: CPT

## 2024-07-02 PROCEDURE — 99213 OFFICE O/P EST LOW 20 MIN: CPT | Mod: 25 | Performed by: ADVANCED PRACTICE MIDWIFE

## 2024-07-02 NOTE — PROGRESS NOTES
"Subjective:     28 year old  at 37w0d presents for routine prenatal visit.     Denies cramping/contractions, vaginal bleeding, discharge or leakage of fluid. Reports +fetal movement.  No HA, vision changes, ruq/epigastric pain.     Patient concerns: Feeling well overall. BPP today d/t pre-preg BMI >40- 8/8, mvp wnl, cephalic. Growth  17.8%tile.     Desires spontaneous labor but open to IOL if undelivered by MICHEAL. Will continue to consider.     Wondering if there are birth tours that are in [place].   - Taking 12 weeks off of work pp, no paternity leave.    Objective:  Vitals:    24 1338   BP: 124/84   Pulse: 72   Weight: 140.4 kg (309 lb 8 oz)   Height: 1.7 m (5' 6.93\")        See OB flowsheet    Ultrasound:  Anatomy Scan:  Estrada gestation.  Fetal heart activity: Rate and rhythm is within normal limits.  Fetal heart rate: 147bpm  Fetal presentation: Cephalic  Placenta: Anterior   Amniotic fluid: 6.4cm MVP     Biophysical Profile:  Fetal body movements: Normal (2)  Fetal tone: Normal (2)  Fetal breathing movements: Normal (2)  Amniotic fluid volume: Normal (2)   BPP Score: 8/8  Technique: Transabdominal Imaging performed     Impression:   BPP 8/8  Normal amniotic fluid volume  Cephalic presentation       Assessment/Plan  Patient Active Problem List    Diagnosis Date Noted    Need for immunization against rubella 2024     Priority: Medium    Maternal varicella, non-immune 2024     Priority: Medium    Vitamin D deficiency 2024     Priority: Medium     Noted to be 24 at initial OB visit    3/3: taking supplementation      HRP (high risk pregnancy), unspecified trimester 2024     Priority: Medium     [facility] Patient Provider Group choice: CNM group  Partner's name: Tunde  [x]BARB folder  [x]Dating  [x] 1st trimester screening: MFM referral placed for 1st trimester screening place  [x]Offer AFP after 15 wks  [x]Fetal anatomy US ordered  [x]Rubella NON-immune, " accepts vaccination postpartum  [x]Hep B immune   [x]Varicella NON-immune, accepts vaccination pp  [x]Pap collected at initial OB visit.  [x] recommended LD ASA after 12 wks for PRE-E risk  [x] GDM risk, recommended early GCT and hgb A1C  [x]No need for utox in labor  [x]COVID vaccine completed  _____________________________________  [x]EOB folder  [x]PP Contraception plan: undecided  [x]Labor plans: unmedicated, but open  [x]: interested, resources given  [x]Infant feeding plan: breast  [x]FLU shot  [x]TDAP   []RSV  [x]GCT  ________________________________________  [x] OTC PP meds - at home  [x]PP plans: 12 maternity leave  []Planning CS-ERAS pkt       Prepregnancy BMI 43- early 1hr Growth 28, 34, weekly BPP 34 weeks IOL 39-39+6 2023     Priority: Medium     : prefers spontaneous, iol if gets to melanie,  EFW 17.8%tile  Bpp and growth , bpp 7/16  7/3- bpp 8/8    2024:  - growth at 28 and 34 weeks  followed by weekly  testing starting at 34 weeks due to maternal BMI >40.       Orders Placed This Encounter   Procedures    US OB Fetal Biophys Prf wo NonStrs Singls Sgl    US OB Fetal Biophys Prf wo NonStrs Singls Sgl     Discussed plans of labor and birth, updated the individualized prenatal care plan on the problem list  - Reviewed why/how to contact provider if headache/visual changes/RUQ or epigastric pain, decreased fetal movement, vaginal bleeding, leakage of fluid or strong/regular contractions    - Reviewed normal BPP.  - Reviewed  surveillance and delivery timing recommendations for BMI >40.   Pt has BPP, growth ultrasound and CARLOS scheduled for .  - Needs BPP with  CARLOS appt. Order placed, will schedule.  Continue discussion of IOL. Pt considering 40 weeks, will need to schedule if agreeable.     I, Shirin Del Rio, am serving as a scribe; to document services personally performed by ERIC Boo CNM based on data collection and the provider's statements to  me.     Shirin Del Rio     The encounter was performed by me and scribed by the SNM. The scribed note accurately reflects my personal services and decisions made by me.     ERIC Boo, SELAM

## 2024-07-09 ENCOUNTER — PRENATAL OFFICE VISIT (OUTPATIENT)
Dept: OBGYN | Facility: CLINIC | Age: 29
End: 2024-07-09
Attending: REGISTERED NURSE
Payer: COMMERCIAL

## 2024-07-09 ENCOUNTER — ANCILLARY PROCEDURE (OUTPATIENT)
Dept: ULTRASOUND IMAGING | Facility: CLINIC | Age: 29
End: 2024-07-09
Attending: REGISTERED NURSE
Payer: COMMERCIAL

## 2024-07-09 ENCOUNTER — TELEPHONE (OUTPATIENT)
Dept: OBGYN | Facility: CLINIC | Age: 29
End: 2024-07-09

## 2024-07-09 VITALS
DIASTOLIC BLOOD PRESSURE: 81 MMHG | HEIGHT: 67 IN | SYSTOLIC BLOOD PRESSURE: 120 MMHG | BODY MASS INDEX: 45.99 KG/M2 | WEIGHT: 293 LBS | HEART RATE: 82 BPM

## 2024-07-09 DIAGNOSIS — O09.90 HRP (HIGH RISK PREGNANCY), UNSPECIFIED TRIMESTER: Primary | ICD-10-CM

## 2024-07-09 DIAGNOSIS — E66.01 CLASS 3 SEVERE OBESITY WITHOUT SERIOUS COMORBIDITY WITH BODY MASS INDEX (BMI) OF 40.0 TO 44.9 IN ADULT, UNSPECIFIED OBESITY TYPE (H): ICD-10-CM

## 2024-07-09 DIAGNOSIS — O09.90 HRP (HIGH RISK PREGNANCY), UNSPECIFIED TRIMESTER: ICD-10-CM

## 2024-07-09 DIAGNOSIS — E66.813 CLASS 3 SEVERE OBESITY WITHOUT SERIOUS COMORBIDITY WITH BODY MASS INDEX (BMI) OF 40.0 TO 44.9 IN ADULT, UNSPECIFIED OBESITY TYPE (H): ICD-10-CM

## 2024-07-09 PROCEDURE — 99211 OFF/OP EST MAY X REQ PHY/QHP: CPT | Mod: 25 | Performed by: ADVANCED PRACTICE MIDWIFE

## 2024-07-09 PROCEDURE — 76819 FETAL BIOPHYS PROFIL W/O NST: CPT

## 2024-07-09 PROCEDURE — 99207 PR PRENATAL VISIT: CPT | Performed by: ADVANCED PRACTICE MIDWIFE

## 2024-07-09 PROCEDURE — 76819 FETAL BIOPHYS PROFIL W/O NST: CPT | Mod: 26 | Performed by: STUDENT IN AN ORGANIZED HEALTH CARE EDUCATION/TRAINING PROGRAM

## 2024-07-09 NOTE — PROGRESS NOTES
"Subjective:     28 year old  at 38w0d presents for routine prenatal visit.     Denies cramping/contractions, vaginal bleeding, discharge or leakage of fluid. Reports +fetal movement.  No HA, vision changes, ruq/epigastric pain.        Patient concerns: Feeling well overall. BPP today- 8/8, mvp wnl, cephalic. Agreeable to induction at 40 weeks for pre-pregnancy BMI of 43.     Objective:  Vitals:    24 1348   BP: 120/81   Pulse: 82   Weight: 143.3 kg (316 lb)   Height: 1.7 m (5' 6.93\")      See OB flowsheet    Ultrasound:  Anatomy Scan:   Estrada gestation.   Fetal heart activity: Rate and rhythm is within normal limits.  Fetal heart rate: 147bpm   Fetal presentation: Cephalic   Placenta: Anterior   Amniotic fluid: 5.3cm MVP     Biophysical Profile:   Fetal body movements: Normal (2)   Fetal tone: Normal (2)   Fetal breathing movements: Normal (2)   Amniotic fluid volume: Normal (2)   BPP Score: 8/8   Technique: Transabdominal Imaging performed     Impression:   Cephalic fetal presentation  Normal amniotic fluid  BPP 8/8     Continue  surveillance.     Assessment/Plan  Patient Active Problem List    Diagnosis Date Noted    Need for immunization against rubella 2024     Priority: Medium    Maternal varicella, non-immune 2024     Priority: Medium    Vitamin D deficiency 2024     Priority: Medium     Noted to be 24 at initial OB visit    3/3: taking supplementation      HRP (high risk pregnancy), unspecified trimester 2024     Priority: Medium     [facility] Provider Group choice: CNM group  Partner's name: Tunde  [x]BARB folder  [x]Dating  [x] 1st trimester screening: MFM referral placed for 1st trimester screening place  [x]Offer AFP after 15 wks  [x]Fetal anatomy US ordered  [x]Rubella NON-immune, accepts vaccination postpartum  [x]Hep B immune   [x]Varicella NON-immune, accepts vaccination pp  [x]Pap collected at initial OB visit.  [x] recommended LD " ASA after 12 wks for PRE-E risk  [x] GDM risk, recommended early GCT and hgb A1C  [x]No need for utox in labor  [x]COVID vaccine completed  _____________________________________  [x]EOB folder  [x]PP Contraception plan: undecided  [x]Labor plans: unmedicated, but open  [x]: interested, resources given  [x]Infant feeding plan: breast  [x]FLU shot  [x]TDAP   []RSV  [x]GCT  ________________________________________  [x] OTC PP meds - at home  [x]PP plans: 12 maternity leave  []Planning CS-ERAS pkt       Prepregnancy BMI 43- early 1hr Growth 28, 34, weekly BPP 34 weeks IOL 39-39+6- 2023     Priority: Medium     : prefers spontaneous, iol if gets to melanie,  EFW 17.8%tile  Bpp and growth , bpp 7/16  7/3- bpp 2024:  - growth at 28 and 34 weeks  followed by weekly  testing starting at 34 weeks due to maternal BMI >40.    Scheduled for        Orders Placed This Encounter   Procedures    US OB Biophys Single Gestation Measure     Discussed plans of labor and birth, updated the individualized prenatal care plan on the problem list  - Reviewed why/how to contact provider if headache/visual changes/RUQ or epigastric pain, decreased fetal movement, vaginal bleeding, leakage of fluid or strong/regular contractions    - Reviewed recommendation for IOL 39-39+6 d/t BMI. Pt agreeable to 40 weeks.  - IOL scheduled for .   - Continue  surveillance- BPP and CARLOS on .    ERIC Boo, ELIZABETHM

## 2024-07-09 NOTE — PATIENT INSTRUCTIONS
Thank you for trusting us with your care!     If you need to contact us for questions about:  Symptoms, Scheduling & Medical Questions; Non-urgent (2-3 day response) [place] message, Urgent (needing response today) [phone] (if after 3:30pm next day response)   Prescriptions: Please call your Pharmacy   Billing: Ciara [phone] or [facility]:[phone]'  Week 38 of Your Pregnancy: Care Instructions  Believe it or not, your baby is almost here. You may notice how your baby responds to sounds, warmth, cold, and light. You may even know what kind of music your baby likes.    Even if you expect a vaginal birth, it's a good idea to learn about  section ().  is the delivery of a baby through a cut (incision) in your belly. It's a major surgery, so it has more risks than a vaginal birth.    During the first 2 weeks after the birth, limit visitors. Don't allow visitors who have colds or infections. Ask visitors to wash their hands before they touch your baby. And never let anyone smoke around your baby.    Know about unplanned C-sections.  Reasons for an unplanned  include labor that slows or stops, signs of distress in your baby, and high blood pressure or other problems for you.     Know about planned C-sections.  If your baby isn't in a head-down position for delivery (breech position), your doctor may plan a . Or you may have a planned  if you're having twins or more.     Get as much help as you can while you're in the hospital.  You can learn about feeding, diapering, and bathing your baby.     Talk to your doctor or midwife about how to care for the umbilical cord stump.  If your baby will be circumcised, also ask about how to care for that.     Ask friends or family for help, as you need it.  If you can, have another adult in your home for at least 2 or 3 days after the birth.     Try to nap when your baby naps.  This may be your best chance to get  "some sleep.     Watch for changes in your mental health.  For the first 1 to 2 weeks after birth, it's common to cry or feel sad or irritable. If these feelings last for more than 2 weeks, you may have postpartum depression. Ask your doctor for help. Postpartum depression can be treated.   Follow-up care is a key part of your treatment and safety. Be sure to make and go to all appointments, and call your doctor if you are having problems. It's also a good idea to know your test results and keep a list of the medicines you take.  Where can you learn more?  Go to https://www.[employer].net/patiented  Enter B044 in the search box to learn more about \"Week 38 of Your Pregnancy: Care Instructions.\"  Current as of: July 10, 2023               Content Version: 14.0    7680-1142 [employer].   Care instructions adapted under license by your healthcare professional. If you have questions about a medical condition or this instruction, always ask your healthcare professional. [employer] disclaims any warranty or liability for your use of this information.      Week 39 of Your Pregnancy: Care Instructions    [place] babies can look different than what you see in pictures or movies. Their heads can be a strange shape right after birth. And they may have swollen eyes and red marks on their faces.    You can still get pregnant even if you are breastfeeding. If you don't want to get pregnant, talk to your doctor about birth control.  Tips for week 39 of pregnancy  If you plan to breastfeed, get prepared.     Continue to eat healthy foods.  Keep taking your prenatal vitamins during breastfeeding if your doctor recommends it.  Talk to your doctor before taking any medicines or supplements.  Choose the right birth control for you.     Intrauterine devices (IUDs) are placed in the uterus. Sometimes the IUD can be placed right after giving birth. They work for years.  Hormonal implants are placed under the " "skin of the arm. They also work for years.  Depo-Provera is a shot. You get it every 3 months.  Birth control pills can be used. They're taken every day.  Tubal ligation (tying your tubes) and vasectomy are surgeries. They're permanent.  Diaphragms, spermicide, and condoms must be used each time you have sex. If you used a diaphragm before, you should get refitted after the baby is born.  A birth control patch or ring can be used. These just can't be started until several weeks after you give birth.  Follow-up care is a key part of your treatment and safety. Be sure to make and go to all appointments, and call your doctor if you are having problems. It's also a good idea to know your test results and keep a list of the medicines you take.  Where can you learn more?  Go to https://www.[employer].Jusp/patiented  Enter A811 in the search box to learn more about \"Week 39 of Your Pregnancy: Care Instructions.\"  Current as of: July 10, 2023               Content Version: 14.0    2136-4786 [employer].   Care instructions adapted under license by your healthcare professional. If you have questions about a medical condition or this instruction, always ask your healthcare professional. [employer] disclaims any warranty or liability for your use of this information.      Weeks 40 to 41 of Your Pregnancy: Care Instructions  By week 40, you've reached your due date. Your baby could be coming any day. Most babies born after their due dates are healthy. But you may have tests to make sure everything is okay. If you feel stressed, you could try a meditation exercise, such as guided imagery. It may help you relax.    If you are past 41 weeks, your doctor may measure the amount of fluid that surrounds your baby. They may also test your baby's movement and heart rate.    If you don't start labor on your own by 41 or 42 weeks, your doctor may want to start (induce) labor. If there are other concerns, your " "doctor may talk to you about a .  To start (induce) your labor, your doctor may do any of these things.    Place a narrow tube with a small balloon on the end (balloon catheter) into your cervix.  The doctor inflates the balloon. This helps your cervix open (dilate).     Sweep the membranes (separate the lining of the amniotic sac from the uterus).  This helps the uterus make a chemical that can start contractions.     \"Break your water\" (rupture the amniotic sac).  This may be done if your cervix has started to open.     Use medicines.  They may be used to soften the cervix or start contractions.   How to do guided imagery    Close your eyes, and take a few deep breaths. Picture a peaceful setting, such as a beach or a meadow. Add a winding path. Follow the path until you feel more and more relaxed.    Take a few minutes to breathe slowly and feel the calm. When you are ready, slowly take yourself back to the present. Count to 3, and open your eyes.  Follow-up care is a key part of your treatment and safety. Be sure to make and go to all appointments, and call your doctor if you are having problems. It's also a good idea to know your test results and keep a list of the medicines you take.  Where can you learn more?  Go to https://www.[employer].net/patiented  Enter T922 in the search box to learn more about \"Weeks 40 to 41 of Your Pregnancy: Care Instructions.\"  Current as of: July 10, 2023               Content Version: 14.0    1103-0341 [employer].   Care instructions adapted under license by your healthcare professional. If you have questions about a medical condition or this instruction, always ask your healthcare professional. [employer] disclaims any warranty or liability for your use of this information.      Labor Induction  What You Need to Know  What is labor induction?  In most cases, it is best to go into labor naturally. When labor does not start on its own, we may " "use medicine or other methods to start (induce) labor. This is called induction, which:  Helps the uterus contract  Thins, softens and opens the cervix (opening of the uterus)  When is induction used?  There are two types of induction.  Medical induction may be done to protect the health of the parent or baby if:  There are medical concerns for you or your baby.  You haven't had your baby by 41 weeks.  Induction for non-medical reasons may be done at 39 weeks or later if:  You live far from the hospital.  You've been having contractions for many days.  You've given birth quickly in the past.   We will not perform an induction for non-medical reasons before 39 weeks. Studies show that babies born before 39 weeks may struggle with breathing, feeding, sleeping and staying warm. They are more likely to have health problems and may need to stay in the hospital longer. If we start your labor for medical reasons, the benefits will outweigh these risks. We will talk to you about your risks, benefits and alternatives (other options) before we start your labor.  How is induction done?  We may start your labor by doing one or more of the following:  We may need to help your cervix soften and open (sometimes called \"ripening\") to prepare it for labor. There are two ways to do this:  Medicines--these may be in the form of pills that you swallow or medicines that are put into the vagina next to the cervix.  Mechanical--using either a single or double balloon. These are small balloons that are attached to tubes that go up inside the cervix. The balloons are then filled with water to put pressure on the cervix and help the cervix soften and open. Depending on the type of balloon used, you may be allowed to go home after it is placed.  After your cervix is ready:  We may give you medicine through an IV (a small tube placed in your vein). This medicine is called Pitocin. It helps the muscles of your uterus to contract.  We may make a " "small opening in your bag of water (the sac around your baby). This is called an amniotomy. Sometimes called \"breaking the water\". It may help your uterus contract and your cervix open.  What might happen if my labor is induced?  Some of this depends on how your labor is started and how your body responds. Your labor may be more complicated. You and your baby may need more medical treatments. In general:  You may not go into labor right away. If so, we may send you home with follow-up instructions.  It will be important to monitor you and your baby during the induction.  You may not be able to move around during labor. You will have two belts with monitors attached to your belly. These allow the nurse to watch your contractions and your baby's heart rate.  Your labor may take longer than if you went into labor on your own. It might take more than one day.  If you need cervical ripening, it is important to know that it may be many hours (even days) until delivery happens.  Cervical ripening can be slow and require several doses before your body is ready to labor.  A long labor may increase the risk of infection in mother and baby.  Your labor may not progress as planned. This could lead to a  birth.  Can I plan the date of my delivery?  After 39 weeks, you may ask about planning your delivery date. This is only an option if your body--and your baby--are ready. To find out, we will check your cervix and your baby's heart rate.   If you are ready to be induced, we will give you a date and time to come to the hospital. If many patients are in labor that day, we may need to start your labor at another time.  If you are not ready, we will not start your labor. It will be safer for your baby to come on its own.  What else do I need to know?  Before you have an induction, make sure you understand the reasons, risks and benefits. Ask your doctor or nurse-midwife:  Why do I need to be induced?  What are the risks to " my baby?  How will you start my labor?  How will you know if my baby is ready to be born?  How will you know if my body is ready to give birth?  Where can I get more information?  To learn more about induction, you may visit these websites:  The American College of Nurse-Midwives:  www.mymidwife.org  The American College of Obstetricians and Gynecologists: www.acog.org  Childbirth Connection:  www.childbirthconnection.org  March of Dimes: www.marchofdimes.[employer]  Association of Women's Health, Obstetrics, and  Nursing  www.aujegp7ezp.org/go-the-full-40&#047;  For informational purposes only. Not to replace the advice of your health care provider. Copyright   2008 [facility]. All rights reserved. Clinically reviewed by the  System Operations Leadership team. [employer] 122416 - REV .

## 2024-07-16 ENCOUNTER — ANCILLARY PROCEDURE (OUTPATIENT)
Dept: ULTRASOUND IMAGING | Facility: CLINIC | Age: 29
End: 2024-07-16
Attending: ADVANCED PRACTICE MIDWIFE
Payer: COMMERCIAL

## 2024-07-16 ENCOUNTER — PRENATAL OFFICE VISIT (OUTPATIENT)
Dept: OBGYN | Facility: CLINIC | Age: 29
End: 2024-07-16
Attending: ADVANCED PRACTICE MIDWIFE
Payer: COMMERCIAL

## 2024-07-16 VITALS
WEIGHT: 293 LBS | BODY MASS INDEX: 45.99 KG/M2 | HEIGHT: 67 IN | DIASTOLIC BLOOD PRESSURE: 77 MMHG | SYSTOLIC BLOOD PRESSURE: 110 MMHG | HEART RATE: 90 BPM

## 2024-07-16 DIAGNOSIS — O09.90 HRP (HIGH RISK PREGNANCY), UNSPECIFIED TRIMESTER: ICD-10-CM

## 2024-07-16 DIAGNOSIS — O09.93 HRP (HIGH RISK PREGNANCY), THIRD TRIMESTER: Primary | ICD-10-CM

## 2024-07-16 PROCEDURE — 76818 FETAL BIOPHYS PROFILE W/NST: CPT

## 2024-07-16 PROCEDURE — 76816 OB US FOLLOW-UP PER FETUS: CPT | Mod: 26 | Performed by: STUDENT IN AN ORGANIZED HEALTH CARE EDUCATION/TRAINING PROGRAM

## 2024-07-16 PROCEDURE — 76818 FETAL BIOPHYS PROFILE W/NST: CPT | Mod: 26 | Performed by: STUDENT IN AN ORGANIZED HEALTH CARE EDUCATION/TRAINING PROGRAM

## 2024-07-16 PROCEDURE — 59025 FETAL NON-STRESS TEST: CPT | Mod: 59 | Performed by: ADVANCED PRACTICE MIDWIFE

## 2024-07-16 PROCEDURE — 99207 PR PRENATAL VISIT: CPT | Performed by: ADVANCED PRACTICE MIDWIFE

## 2024-07-16 PROCEDURE — 76816 OB US FOLLOW-UP PER FETUS: CPT

## 2024-07-16 PROCEDURE — 99213 OFFICE O/P EST LOW 20 MIN: CPT | Mod: 25 | Performed by: ADVANCED PRACTICE MIDWIFE

## 2024-07-16 ASSESSMENT — PAIN SCALES - GENERAL: PAINLEVEL: NO PAIN (0)

## 2024-07-16 NOTE — PROGRESS NOTES
"Subjective:     28 year old  at 39w0d presents for routine prenatal visit.   No vaginal bleeding or leakage of fluid. Denies contractions or cramping.   Reports regular fetal movement.       No HA, visual changes, RUQ or epigastric pain.       Patient concerns: denies concerns.  Has instructions for induction of labor.   BPP completed today 6/8 points off for breathing, NST reactive with Baseline 140, moderate variability with accelerations, no decelerations.  Category 1 tracing.  Objective:  Vitals:    24 1448   BP: 110/77   Pulse: 90   Weight: 143.8 kg (317 lb)   Height: 1.7 m (5' 6.93\")      See OB flowsheet    Assessment/Plan  Patient Active Problem List    Diagnosis Date Noted    Need for immunization against rubella 2024     Priority: Medium    Maternal varicella, non-immune 2024     Priority: Medium    Vitamin D deficiency 2024     Priority: Medium     Noted to be 23 at initial OB visit    3/3: taking supplementation      HRP (high risk pregnancy), unspecified trimester 2024     Priority: Medium     [facility] Group choice: CNM group  Partner's name: Tunde  [x]NOB folder  [x]Dating  [x] 1st trimester screening: MFM referral placed for 1st trimester screening place  [x]Offer AFP after 15 wks  [x]Fetal anatomy US ordered  [x]Rubella NON-immune, accepts vaccination postpartum  [x]Hep B immune   [x]Varicella NON-immune, accepts vaccination pp  [x]Pap collected at initial OB visit.  [x] recommended LD ASA after 12 wks for PRE-E risk  [x] GDM risk, recommended early GCT and hgb A1C  [x]No need for utox in labor  [x]COVID vaccine completed  _____________________________________  [x]EOB folder  [x]PP Contraception plan: undecided  [x]Labor plans: unmedicated, but open  [x]: interested, resources given  [x]Infant feeding plan: breast  [x]FLU shot  [x]TDAP   []RSV  [x]GCT  ________________________________________  [x] OTC PP meds - at home  [x]PP " plans: 12 maternity leave  []Planning CS-ERAS pkt       Prepregnancy BMI 43- early 1hr Growth 28, 34, weekly BPP 34 weeks IOL 39-39+6-  @ 1930 2023     Priority: Medium     : prefers spontaneous, iol if gets to melanie,  EFW 17.8%tile  Bpp and growth , bpp 7/16  7/3- bpp /2024:  - growth at 28 and 34 weeks  followed by weekly  testing starting at 34 weeks due to maternal BMI >40.    Scheduled for  @        No orders of the defined types were placed in this encounter.    Discussed plans of labor and birth, updated the individualized prenatal care plan on the problem list  - Reviewed why/how to contact provider if headache/visual changes/RUQ or epigastric pain, decreased fetal movement, vaginal bleeding, leakage of fluid or strong/regular contractions  Return to clinic in 1 week and prn if questions or concerns.   Ruthy Buck, ERIC CNM

## 2024-07-16 NOTE — PATIENT INSTRUCTIONS
Thank you for trusting us with your care!     If you need to contact us for questions about:  Symptoms, Scheduling & Medical Questions; Non-urgent (2-3 day response) [place]na message, Urgent (needing response today) [phone] (if after 3:30pm next day response)   Prescriptions: Please call your Pharmacy   Billing: Ciara [phone] or [facility]:[phone]

## 2024-07-23 ENCOUNTER — HOSPITAL ENCOUNTER (INPATIENT)
Facility: CLINIC | Age: 29
LOS: 4 days | Discharge: HOME HEALTH | End: 2024-07-27
Attending: NURSE PRACTITIONER | Admitting: NURSE PRACTITIONER
Payer: COMMERCIAL

## 2024-07-23 DIAGNOSIS — Z30.011 ENCOUNTER FOR INITIAL PRESCRIPTION OF CONTRACEPTIVE PILLS: ICD-10-CM

## 2024-07-23 DIAGNOSIS — Z98.891 S/P CESAREAN SECTION: Primary | ICD-10-CM

## 2024-07-23 PROBLEM — O99.210 OBESITY COMPLICATING PREGNANCY: Status: ACTIVE | Noted: 2024-07-23

## 2024-07-23 PROBLEM — Z34.90 ENCOUNTER FOR INDUCTION OF LABOR: Status: ACTIVE | Noted: 2024-07-23

## 2024-07-23 LAB
ABO/RH(D): NORMAL
ALBUMIN MFR UR ELPH: 8 MG/DL
ALBUMIN SERPL BCG-MCNC: 3.3 G/DL (ref 3.5–5.2)
ALP SERPL-CCNC: 122 U/L (ref 40–150)
ALT SERPL W P-5'-P-CCNC: 49 U/L (ref 0–50)
ANION GAP SERPL CALCULATED.3IONS-SCNC: 12 MMOL/L (ref 7–15)
ANTIBODY SCREEN: NEGATIVE
AST SERPL W P-5'-P-CCNC: 20 U/L (ref 0–45)
BILIRUB SERPL-MCNC: <0.2 MG/DL
BUN SERPL-MCNC: 13.1 MG/DL (ref 6–20)
CALCIUM SERPL-MCNC: 9.8 MG/DL (ref 8.8–10.4)
CHLORIDE SERPL-SCNC: 103 MMOL/L (ref 98–107)
CREAT SERPL-MCNC: 0.57 MG/DL (ref 0.51–0.95)
CREAT UR-MCNC: 74.5 MG/DL
EGFRCR SERPLBLD CKD-EPI 2021: >90 ML/MIN/1.73M2
ERYTHROCYTE [DISTWIDTH] IN BLOOD BY AUTOMATED COUNT: 14.8 % (ref 10–15)
GLUCOSE SERPL-MCNC: 93 MG/DL (ref 70–99)
HCO3 SERPL-SCNC: 20 MMOL/L (ref 22–29)
HCT VFR BLD AUTO: 37.1 % (ref 35–47)
HGB BLD-MCNC: 11.9 G/DL (ref 11.7–15.7)
MCH RBC QN AUTO: 26.6 PG (ref 26.5–33)
MCHC RBC AUTO-ENTMCNC: 32.1 G/DL (ref 31.5–36.5)
MCV RBC AUTO: 83 FL (ref 78–100)
PLATELET # BLD AUTO: 241 10E3/UL (ref 150–450)
POTASSIUM SERPL-SCNC: 4 MMOL/L (ref 3.4–5.3)
PROT SERPL-MCNC: 7 G/DL (ref 6.4–8.3)
PROT/CREAT 24H UR: 0.11 MG/MG CR (ref 0–0.2)
RBC # BLD AUTO: 4.47 10E6/UL (ref 3.8–5.2)
SODIUM SERPL-SCNC: 135 MMOL/L (ref 135–145)
SPECIMEN EXPIRATION DATE: NORMAL
WBC # BLD AUTO: 9.9 10E3/UL (ref 4–11)

## 2024-07-23 PROCEDURE — 120N000002 HC R&B MED SURG/OB UMMC

## 2024-07-23 PROCEDURE — 36415 COLL VENOUS BLD VENIPUNCTURE: CPT | Performed by: NURSE PRACTITIONER

## 2024-07-23 PROCEDURE — 84156 ASSAY OF PROTEIN URINE: CPT | Performed by: NURSE PRACTITIONER

## 2024-07-23 PROCEDURE — 86780 TREPONEMA PALLIDUM: CPT | Performed by: NURSE PRACTITIONER

## 2024-07-23 PROCEDURE — 85027 COMPLETE CBC AUTOMATED: CPT | Performed by: NURSE PRACTITIONER

## 2024-07-23 PROCEDURE — 258N000003 HC RX IP 258 OP 636: Performed by: NURSE PRACTITIONER

## 2024-07-23 PROCEDURE — 80053 COMPREHEN METABOLIC PANEL: CPT | Performed by: NURSE PRACTITIONER

## 2024-07-23 PROCEDURE — 86900 BLOOD TYPING SEROLOGIC ABO: CPT | Performed by: NURSE PRACTITIONER

## 2024-07-23 RX ORDER — NALOXONE HYDROCHLORIDE 0.4 MG/ML
0.2 INJECTION, SOLUTION INTRAMUSCULAR; INTRAVENOUS; SUBCUTANEOUS
Status: DISCONTINUED | OUTPATIENT
Start: 2024-07-23 | End: 2024-07-25

## 2024-07-23 RX ORDER — PROCHLORPERAZINE 25 MG
25 SUPPOSITORY, RECTAL RECTAL EVERY 12 HOURS PRN
Status: DISCONTINUED | OUTPATIENT
Start: 2024-07-23 | End: 2024-07-25

## 2024-07-23 RX ORDER — KETOROLAC TROMETHAMINE 30 MG/ML
30 INJECTION, SOLUTION INTRAMUSCULAR; INTRAVENOUS
Status: DISCONTINUED | OUTPATIENT
Start: 2024-07-23 | End: 2024-07-25

## 2024-07-23 RX ORDER — METOCLOPRAMIDE HYDROCHLORIDE 5 MG/ML
10 INJECTION INTRAMUSCULAR; INTRAVENOUS EVERY 6 HOURS PRN
Status: DISCONTINUED | OUTPATIENT
Start: 2024-07-23 | End: 2024-07-25

## 2024-07-23 RX ORDER — SODIUM CHLORIDE, SODIUM LACTATE, POTASSIUM CHLORIDE, CALCIUM CHLORIDE 600; 310; 30; 20 MG/100ML; MG/100ML; MG/100ML; MG/100ML
INJECTION, SOLUTION INTRAVENOUS CONTINUOUS PRN
Status: DISCONTINUED | OUTPATIENT
Start: 2024-07-23 | End: 2024-07-25

## 2024-07-23 RX ORDER — NALOXONE HYDROCHLORIDE 0.4 MG/ML
0.4 INJECTION, SOLUTION INTRAMUSCULAR; INTRAVENOUS; SUBCUTANEOUS
Status: DISCONTINUED | OUTPATIENT
Start: 2024-07-23 | End: 2024-07-25

## 2024-07-23 RX ORDER — METOCLOPRAMIDE 10 MG/1
10 TABLET ORAL EVERY 6 HOURS PRN
Status: DISCONTINUED | OUTPATIENT
Start: 2024-07-23 | End: 2024-07-25

## 2024-07-23 RX ORDER — MISOPROSTOL 200 UG/1
800 TABLET ORAL
Status: DISCONTINUED | OUTPATIENT
Start: 2024-07-23 | End: 2024-07-25

## 2024-07-23 RX ORDER — OXYTOCIN/0.9 % SODIUM CHLORIDE 30/500 ML
100-340 PLASTIC BAG, INJECTION (ML) INTRAVENOUS CONTINUOUS PRN
Status: DISCONTINUED | OUTPATIENT
Start: 2024-07-23 | End: 2024-07-25

## 2024-07-23 RX ORDER — OXYTOCIN/0.9 % SODIUM CHLORIDE 30/500 ML
1-24 PLASTIC BAG, INJECTION (ML) INTRAVENOUS CONTINUOUS
Status: DISCONTINUED | OUTPATIENT
Start: 2024-07-23 | End: 2024-07-24

## 2024-07-23 RX ORDER — LOPERAMIDE HCL 2 MG
4 CAPSULE ORAL
Status: DISCONTINUED | OUTPATIENT
Start: 2024-07-23 | End: 2024-07-25

## 2024-07-23 RX ORDER — IBUPROFEN 800 MG/1
800 TABLET, FILM COATED ORAL
Status: DISCONTINUED | OUTPATIENT
Start: 2024-07-23 | End: 2024-07-25

## 2024-07-23 RX ORDER — ONDANSETRON 4 MG/1
4 TABLET, ORALLY DISINTEGRATING ORAL EVERY 6 HOURS PRN
Status: DISCONTINUED | OUTPATIENT
Start: 2024-07-23 | End: 2024-07-25

## 2024-07-23 RX ORDER — TERBUTALINE SULFATE 1 MG/ML
0.25 INJECTION, SOLUTION SUBCUTANEOUS
Status: DISCONTINUED | OUTPATIENT
Start: 2024-07-23 | End: 2024-07-24

## 2024-07-23 RX ORDER — LIDOCAINE 40 MG/G
CREAM TOPICAL
Status: DISCONTINUED | OUTPATIENT
Start: 2024-07-23 | End: 2024-07-25

## 2024-07-23 RX ORDER — MISOPROSTOL 200 UG/1
400 TABLET ORAL
Status: DISCONTINUED | OUTPATIENT
Start: 2024-07-23 | End: 2024-07-25

## 2024-07-23 RX ORDER — LOPERAMIDE HCL 2 MG
2 CAPSULE ORAL
Status: DISCONTINUED | OUTPATIENT
Start: 2024-07-23 | End: 2024-07-25

## 2024-07-23 RX ORDER — ONDANSETRON 2 MG/ML
4 INJECTION INTRAMUSCULAR; INTRAVENOUS EVERY 6 HOURS PRN
Status: DISCONTINUED | OUTPATIENT
Start: 2024-07-23 | End: 2024-07-25

## 2024-07-23 RX ORDER — TRANEXAMIC ACID 10 MG/ML
1 INJECTION, SOLUTION INTRAVENOUS EVERY 30 MIN PRN
Status: DISCONTINUED | OUTPATIENT
Start: 2024-07-23 | End: 2024-07-25

## 2024-07-23 RX ORDER — PROCHLORPERAZINE MALEATE 10 MG
10 TABLET ORAL EVERY 6 HOURS PRN
Status: DISCONTINUED | OUTPATIENT
Start: 2024-07-23 | End: 2024-07-25

## 2024-07-23 RX ORDER — SODIUM CHLORIDE, SODIUM LACTATE, POTASSIUM CHLORIDE, CALCIUM CHLORIDE 600; 310; 30; 20 MG/100ML; MG/100ML; MG/100ML; MG/100ML
INJECTION, SOLUTION INTRAVENOUS CONTINUOUS
Status: DISCONTINUED | OUTPATIENT
Start: 2024-07-23 | End: 2024-07-25

## 2024-07-23 RX ORDER — FENTANYL CITRATE 50 UG/ML
100 INJECTION, SOLUTION INTRAMUSCULAR; INTRAVENOUS
Status: DISCONTINUED | OUTPATIENT
Start: 2024-07-23 | End: 2024-07-25

## 2024-07-23 RX ORDER — HYDROXYZINE HYDROCHLORIDE 50 MG/1
50 TABLET, FILM COATED ORAL
Status: DISCONTINUED | OUTPATIENT
Start: 2024-07-23 | End: 2024-07-24

## 2024-07-23 RX ORDER — CARBOPROST TROMETHAMINE 250 UG/ML
250 INJECTION, SOLUTION INTRAMUSCULAR
Status: DISCONTINUED | OUTPATIENT
Start: 2024-07-23 | End: 2024-07-25

## 2024-07-23 RX ORDER — CITRIC ACID/SODIUM CITRATE 334-500MG
30 SOLUTION, ORAL ORAL
Status: DISCONTINUED | OUTPATIENT
Start: 2024-07-23 | End: 2024-07-25

## 2024-07-23 RX ORDER — OXYTOCIN 10 [USP'U]/ML
10 INJECTION, SOLUTION INTRAMUSCULAR; INTRAVENOUS
Status: DISCONTINUED | OUTPATIENT
Start: 2024-07-23 | End: 2024-07-25

## 2024-07-23 RX ORDER — METHYLERGONOVINE MALEATE 0.2 MG/ML
200 INJECTION INTRAVENOUS
Status: DISCONTINUED | OUTPATIENT
Start: 2024-07-23 | End: 2024-07-24

## 2024-07-23 RX ORDER — TERBUTALINE SULFATE 1 MG/ML
0.25 INJECTION, SOLUTION SUBCUTANEOUS
Status: DISCONTINUED | OUTPATIENT
Start: 2024-07-23 | End: 2024-07-25

## 2024-07-23 RX ORDER — ACETAMINOPHEN 325 MG/1
650 TABLET ORAL EVERY 4 HOURS PRN
Status: DISCONTINUED | OUTPATIENT
Start: 2024-07-23 | End: 2024-07-25

## 2024-07-23 RX ORDER — CITRIC ACID/SODIUM CITRATE 334-500MG
30 SOLUTION, ORAL ORAL ONCE
Status: COMPLETED | OUTPATIENT
Start: 2024-07-23 | End: 2024-07-25

## 2024-07-23 RX ORDER — MORPHINE SULFATE 10 MG/ML
10 INJECTION, SOLUTION INTRAMUSCULAR; INTRAVENOUS
Status: DISCONTINUED | OUTPATIENT
Start: 2024-07-23 | End: 2024-07-24

## 2024-07-23 RX ORDER — OXYTOCIN/0.9 % SODIUM CHLORIDE 30/500 ML
340 PLASTIC BAG, INJECTION (ML) INTRAVENOUS CONTINUOUS PRN
Status: DISCONTINUED | OUTPATIENT
Start: 2024-07-23 | End: 2024-07-25

## 2024-07-23 RX ADMIN — SODIUM CHLORIDE, POTASSIUM CHLORIDE, SODIUM LACTATE AND CALCIUM CHLORIDE 500 ML: 600; 310; 30; 20 INJECTION, SOLUTION INTRAVENOUS at 22:10

## 2024-07-23 ASSESSMENT — ACTIVITIES OF DAILY LIVING (ADL)
ADLS_ACUITY_SCORE: 18
ADLS_ACUITY_SCORE: 18
ADLS_ACUITY_SCORE: 33
ADLS_ACUITY_SCORE: 18

## 2024-07-24 ENCOUNTER — ANESTHESIA (OUTPATIENT)
Dept: OBGYN | Facility: CLINIC | Age: 29
End: 2024-07-24
Payer: COMMERCIAL

## 2024-07-24 ENCOUNTER — ANESTHESIA EVENT (OUTPATIENT)
Dept: OBGYN | Facility: CLINIC | Age: 29
End: 2024-07-24
Payer: COMMERCIAL

## 2024-07-24 LAB
C PNEUM DNA SPEC QL NAA+PROBE: NOT DETECTED
FLUAV H1 2009 PAND RNA SPEC QL NAA+PROBE: NOT DETECTED
FLUAV H1 RNA SPEC QL NAA+PROBE: NOT DETECTED
FLUAV H3 RNA SPEC QL NAA+PROBE: NOT DETECTED
FLUAV RNA SPEC QL NAA+PROBE: NOT DETECTED
FLUBV RNA SPEC QL NAA+PROBE: NOT DETECTED
HADV DNA SPEC QL NAA+PROBE: NOT DETECTED
HCOV PNL SPEC NAA+PROBE: NOT DETECTED
HMPV RNA SPEC QL NAA+PROBE: NOT DETECTED
HOLD SPECIMEN: NORMAL
HPIV1 RNA SPEC QL NAA+PROBE: NOT DETECTED
HPIV2 RNA SPEC QL NAA+PROBE: NOT DETECTED
HPIV3 RNA SPEC QL NAA+PROBE: NOT DETECTED
HPIV4 RNA SPEC QL NAA+PROBE: NOT DETECTED
M PNEUMO DNA SPEC QL NAA+PROBE: NOT DETECTED
RSV RNA SPEC QL NAA+PROBE: NOT DETECTED
RSV RNA SPEC QL NAA+PROBE: NOT DETECTED
RV+EV RNA SPEC QL NAA+PROBE: NOT DETECTED
SARS-COV-2 RNA RESP QL NAA+PROBE: POSITIVE
T PALLIDUM AB SER QL: NONREACTIVE

## 2024-07-24 PROCEDURE — 250N000011 HC RX IP 250 OP 636: Performed by: STUDENT IN AN ORGANIZED HEALTH CARE EDUCATION/TRAINING PROGRAM

## 2024-07-24 PROCEDURE — 120N000002 HC R&B MED SURG/OB UMMC

## 2024-07-24 PROCEDURE — 00HU33Z INSERTION OF INFUSION DEVICE INTO SPINAL CANAL, PERCUTANEOUS APPROACH: ICD-10-PCS | Performed by: STUDENT IN AN ORGANIZED HEALTH CARE EDUCATION/TRAINING PROGRAM

## 2024-07-24 PROCEDURE — 250N000013 HC RX MED GY IP 250 OP 250 PS 637: Performed by: ADVANCED PRACTICE MIDWIFE

## 2024-07-24 PROCEDURE — 3E0R3BZ INTRODUCTION OF ANESTHETIC AGENT INTO SPINAL CANAL, PERCUTANEOUS APPROACH: ICD-10-PCS | Performed by: STUDENT IN AN ORGANIZED HEALTH CARE EDUCATION/TRAINING PROGRAM

## 2024-07-24 PROCEDURE — 250N000011 HC RX IP 250 OP 636

## 2024-07-24 PROCEDURE — 250N000013 HC RX MED GY IP 250 OP 250 PS 637: Performed by: MIDWIFE

## 2024-07-24 PROCEDURE — 250N000011 HC RX IP 250 OP 636: Performed by: NURSE PRACTITIONER

## 2024-07-24 PROCEDURE — 59510 CESAREAN DELIVERY: CPT | Performed by: STUDENT IN AN ORGANIZED HEALTH CARE EDUCATION/TRAINING PROGRAM

## 2024-07-24 PROCEDURE — 64488 TAP BLOCK BI INJECTION: CPT | Mod: 59 | Performed by: STUDENT IN AN ORGANIZED HEALTH CARE EDUCATION/TRAINING PROGRAM

## 2024-07-24 PROCEDURE — 87635 SARS-COV-2 COVID-19 AMP PRB: CPT | Performed by: ADVANCED PRACTICE MIDWIFE

## 2024-07-24 PROCEDURE — 250N000013 HC RX MED GY IP 250 OP 250 PS 637: Performed by: NURSE PRACTITIONER

## 2024-07-24 PROCEDURE — 258N000003 HC RX IP 258 OP 636: Performed by: NURSE PRACTITIONER

## 2024-07-24 PROCEDURE — 87581 M.PNEUMON DNA AMP PROBE: CPT | Performed by: ADVANCED PRACTICE MIDWIFE

## 2024-07-24 PROCEDURE — 250N000011 HC RX IP 250 OP 636: Performed by: ADVANCED PRACTICE MIDWIFE

## 2024-07-24 PROCEDURE — 250N000009 HC RX 250: Performed by: NURSE PRACTITIONER

## 2024-07-24 PROCEDURE — 10907ZC DRAINAGE OF AMNIOTIC FLUID, THERAPEUTIC FROM PRODUCTS OF CONCEPTION, VIA NATURAL OR ARTIFICIAL OPENING: ICD-10-PCS | Performed by: ADVANCED PRACTICE MIDWIFE

## 2024-07-24 RX ORDER — FENTANYL CITRATE-0.9 % NACL/PF 10 MCG/ML
100 PLASTIC BAG, INJECTION (ML) INTRAVENOUS EVERY 5 MIN PRN
Status: DISCONTINUED | OUTPATIENT
Start: 2024-07-24 | End: 2024-07-25

## 2024-07-24 RX ORDER — FENTANYL/ROPIVACAINE/NS/PF 2MCG/ML-.1
PLASTIC BAG, INJECTION (ML) EPIDURAL
Status: DISCONTINUED | OUTPATIENT
Start: 2024-07-24 | End: 2024-07-24

## 2024-07-24 RX ORDER — OXYTOCIN/0.9 % SODIUM CHLORIDE 30/500 ML
1-24 PLASTIC BAG, INJECTION (ML) INTRAVENOUS CONTINUOUS
Status: DISCONTINUED | OUTPATIENT
Start: 2024-07-24 | End: 2024-07-25

## 2024-07-24 RX ORDER — GUAIFENESIN 600 MG/1
600 TABLET, EXTENDED RELEASE ORAL 2 TIMES DAILY
Status: DISCONTINUED | OUTPATIENT
Start: 2024-07-24 | End: 2024-07-27 | Stop reason: HOSPADM

## 2024-07-24 RX ORDER — FENTANYL/ROPIVACAINE/NS/PF 2MCG/ML-.1
PLASTIC BAG, INJECTION (ML) EPIDURAL
Status: DISCONTINUED
Start: 2024-07-24 | End: 2024-07-25 | Stop reason: HOSPADM

## 2024-07-24 RX ORDER — NALBUPHINE HYDROCHLORIDE 10 MG/ML
2.5-5 INJECTION, SOLUTION INTRAMUSCULAR; INTRAVENOUS; SUBCUTANEOUS EVERY 6 HOURS PRN
Status: DISCONTINUED | OUTPATIENT
Start: 2024-07-24 | End: 2024-07-25

## 2024-07-24 RX ORDER — EPHEDRINE SULFATE 50 MG/ML
25 INJECTION, SOLUTION INTRAMUSCULAR; INTRAVENOUS; SUBCUTANEOUS ONCE
Status: COMPLETED | OUTPATIENT
Start: 2024-07-25 | End: 2024-07-25

## 2024-07-24 RX ORDER — FENTANYL CITRATE-0.9 % NACL/PF 10 MCG/ML
100 PLASTIC BAG, INJECTION (ML) INTRAVENOUS EVERY 5 MIN PRN
Status: COMPLETED | OUTPATIENT
Start: 2024-07-24 | End: 2024-07-24

## 2024-07-24 RX ORDER — FENTANYL/ROPIVACAINE/NS/PF 2MCG/ML-.1
PLASTIC BAG, INJECTION (ML) EPIDURAL
Status: DISCONTINUED | OUTPATIENT
Start: 2024-07-24 | End: 2024-07-25

## 2024-07-24 RX ORDER — FENTANYL CITRATE-0.9 % NACL/PF 10 MCG/ML
PLASTIC BAG, INJECTION (ML) INTRAVENOUS
Status: COMPLETED
Start: 2024-07-24 | End: 2024-07-24

## 2024-07-24 RX ADMIN — Medication: at 23:42

## 2024-07-24 RX ADMIN — Medication 100 MCG: at 23:40

## 2024-07-24 RX ADMIN — FENTANYL CITRATE 100 MCG: 50 INJECTION INTRAMUSCULAR; INTRAVENOUS at 16:58

## 2024-07-24 RX ADMIN — SODIUM CHLORIDE, POTASSIUM CHLORIDE, SODIUM LACTATE AND CALCIUM CHLORIDE: 600; 310; 30; 20 INJECTION, SOLUTION INTRAVENOUS at 10:10

## 2024-07-24 RX ADMIN — NIRMATRELVIR AND RITONAVIR 3 TABLET: KIT at 22:35

## 2024-07-24 RX ADMIN — SODIUM CHLORIDE, POTASSIUM CHLORIDE, SODIUM LACTATE AND CALCIUM CHLORIDE: 600; 310; 30; 20 INJECTION, SOLUTION INTRAVENOUS at 23:13

## 2024-07-24 RX ADMIN — Medication 6 ML: at 20:10

## 2024-07-24 RX ADMIN — Medication: at 20:05

## 2024-07-24 RX ADMIN — HYDROXYZINE HYDROCHLORIDE 50 MG: 50 TABLET, FILM COATED ORAL at 02:40

## 2024-07-24 RX ADMIN — SODIUM CHLORIDE, POTASSIUM CHLORIDE, SODIUM LACTATE AND CALCIUM CHLORIDE: 600; 310; 30; 20 INJECTION, SOLUTION INTRAVENOUS at 03:00

## 2024-07-24 RX ADMIN — FENTANYL CITRATE 100 MCG: 50 INJECTION INTRAMUSCULAR; INTRAVENOUS at 14:25

## 2024-07-24 RX ADMIN — Medication 200 MCG: at 20:11

## 2024-07-24 RX ADMIN — GUAIFENESIN 600 MG: 600 TABLET ORAL at 22:35

## 2024-07-24 RX ADMIN — ACETAMINOPHEN 650 MG: 325 TABLET, FILM COATED ORAL at 08:00

## 2024-07-24 RX ADMIN — FENTANYL CITRATE 100 MCG: 50 INJECTION INTRAMUSCULAR; INTRAVENOUS at 12:39

## 2024-07-24 RX ADMIN — Medication 100 MCG: at 23:10

## 2024-07-24 RX ADMIN — Medication 100 MCG: at 20:07

## 2024-07-24 RX ADMIN — Medication 2 MILLI-UNITS/MIN: at 00:40

## 2024-07-24 RX ADMIN — Medication 100 MCG: at 21:43

## 2024-07-24 RX ADMIN — Medication 100 MCG: at 23:57

## 2024-07-24 RX ADMIN — SODIUM CHLORIDE, POTASSIUM CHLORIDE, SODIUM LACTATE AND CALCIUM CHLORIDE: 600; 310; 30; 20 INJECTION, SOLUTION INTRAVENOUS at 00:06

## 2024-07-24 ASSESSMENT — ACTIVITIES OF DAILY LIVING (ADL)
ADLS_ACUITY_SCORE: 18

## 2024-07-24 NOTE — PROGRESS NOTES
S: Laboring, changing positions. Received 2nd dose of IV fentanyl.    O:  Blood pressure 115/73, temperature 98.5  F (36.9  C), temperature source Oral, resp. rate 18, last menstrual period 10/17/2023.      Baseline rate 155, normal  Variability moderate  Accelerations present   Decelerations present, deceleration type: early, deceleration frequency: intermittent. Are the decelerations significant?   No      CONTRACTIONS: every 3-5 minutes.  Palpate: -200  Pitocin- increased to 6 mu/min.,  Antibiotics- none    ROM: clear fluid  PELVIC EXAM:deferred    # Pain Assessment:      7/24/2024     1:05 PM   Current Pain Score   Patient currently in pain? yes   - Lenka is experiencing pain due to contractions. Pain management was discussed and the plan was created in a collaborative fashion.  Lenka's response to the current recommendations: engaged  - IV fentanyl       Assessment: EFM interpretation suggests absence of concern for fetal metabolic acidemia at this time due to accelerations present and variability: moderate    Labor course:  0910- 4/70/-2, mid/med, mao 8, minimal variability  0900- minimal with short periods of moderate  0930- 250ml LR bolus given  1000- COVID +  1018- moderate variability  1032- pitocin decreased by 1/2 to 5mu  1040- AROM clear  1043- FSE placed, 4/70/-2, OP, moderate variability  1051- pitocin off  1215- cervix unchanged, IUPC placed, FSE replaced   1235- FSE replaced  1239- 100mcg fentanyl #1  1313- pitocin restarted at 2 mu  1425- fentanyl #2    A:  ==============  Lenka Mcclain Angie  28 year old   IUP @ 40w1d IOL BMI 43   Fetal Heart rate tracing  category one  GBS- negative    Patient Active Problem List   Diagnosis    Prepregnancy BMI 43- early 1hr Growth 28, 34, weekly BPP 34 weeks IOL 39-39+6- 7/23 @ 1930    HRP (high risk pregnancy), unspecified trimester    Need for immunization against rubella    Maternal varicella, non-immune    Vitamin D deficiency    Obesity  complicating pregnancy    Encounter for induction of labor          P:  ===========  Continue pitocin titration to adequate MVUs.  Pain  management per pt prn.  Reevaluate prn per maternal/fetal indications.    Lynsey Blunt, APRN, CNM

## 2024-07-24 NOTE — ANESTHESIA PREPROCEDURE EVALUATION
Anesthesia Pre-Procedure Evaluation    Patient: Lenka Adams   MRN: [id number] : 1995        Procedure :           Past Medical History:   Diagnosis Date    Class 3 severe obesity with serious comorbidity and body mass index (BMI) of 45.0 to 49.9 in adult (H) 2023      History reviewed. No pertinent surgical history.   Allergies   Allergen Reactions    Clindamycin Hives and Rash      Social History     Tobacco Use    Smoking status: Never     Passive exposure: Never    Smokeless tobacco: Never   Substance Use Topics    Alcohol use: Not Currently      Wt Readings from Last 1 Encounters:   24 143.8 kg (317 lb)        Anesthesia Evaluation   Pt has not had prior anesthetic         ROS/MED HX  ENT/Pulmonary:     (+)                              recent URI, unresolved, COVID+:        Neurologic:  - neg neurologic ROS     Cardiovascular:  - neg cardiovascular ROS     METS/Exercise Tolerance:     Hematologic:  - neg hematologic  ROS     Musculoskeletal:  - neg musculoskeletal ROS     GI/Hepatic: Comment: Vitamin D deficiency    (+) GERD (On famotidine),                   Renal/Genitourinary:  - neg Renal ROS     Endo:     (+)               Obesity (BMI 49.75),       Psychiatric/Substance Use:  - neg psychiatric ROS     Infectious Disease:       Malignancy:  - neg malignancy ROS     Other:   40w1d    High risk pregnancy due to obesity         Physical Exam    Airway        Mallampati: II   TM distance: > 3 FB   Neck ROM: full   Mouth opening: > 3 cm    Respiratory Devices and Support         Dental       (+) Minor Abnormalities - some fillings, tiny chips      Cardiovascular   cardiovascular exam normal       Rhythm and rate: regular     Pulmonary           breath sounds clear to auscultation   (+) decreased breath sounds           OUTSIDE LABS:  CBC:   Lab Results   Component Value Date    WBC 9.9 2024    WBC 9.4 2024    HGB 11.9 2024    HGB 11.7 2024    HCT  "37.1 07/23/2024    HCT 35.5 04/30/2024     07/23/2024     04/30/2024     BMP:   Lab Results   Component Value Date     07/23/2024     05/04/2023    POTASSIUM 4.0 07/23/2024    POTASSIUM 4.1 05/04/2023    CHLORIDE 103 07/23/2024    CHLORIDE 101 05/04/2023    CO2 20 (L) 07/23/2024    CO2 23 05/04/2023    BUN 13.1 07/23/2024    BUN 7.9 05/04/2023    CR 0.57 07/23/2024    CR 0.56 05/04/2023    GLC 93 07/23/2024    GLC 97 05/04/2023     COAGS: No results found for: \"PTT\", \"INR\", \"FIBR\"  POC:   Lab Results   Component Value Date    HCG Positive (A) 11/17/2023     HEPATIC:   Lab Results   Component Value Date    ALBUMIN 3.3 (L) 07/23/2024    PROTTOTAL 7.0 07/23/2024    ALT 49 07/23/2024    AST 20 07/23/2024    ALKPHOS 122 07/23/2024    BILITOTAL <0.2 07/23/2024     OTHER:   Lab Results   Component Value Date    A1C 5.5 01/08/2024    MARJORIE 9.8 07/23/2024    TSH 2.70 02/22/2023       Anesthesia Plan    ASA Status:  3    NPO Status:  ELEVATED Aspiration Risk/Unknown    Anesthesia Type: Spinal.              Consents    Anesthesia Plan(s) and associated risks, benefits, and realistic alternatives discussed. Questions answered and patient/representative(s) expressed understanding.     - Discussed:     - Discussed with:  Patient            Postoperative Care    Pain management: IV analgesics, Oral pain medications, Peripheral nerve block (Single Shot), Multi-modal analgesia.   PONV prophylaxis: Ondansetron (or other 5HT-3)     Comments:    Other Comments:   Discussed risks of anesthesia including nausea, vomiting, headache, itching, bleeding, infection, cardiopulmonary complications, neurologic complications, and serious complications.    Patient's partner has cough and is blowing nose. Nursing and OB informed; plan for him not to come to OR as symptomatic respiratory condition.           Owen Smith MD    I have reviewed the pertinent notes and labs in the chart from the past 30 days and " (re)examined the patient.  Any updates or changes from those notes are reflected in this note.     # Hyponatremia: Lowest Na = 135 mmol/L in last 30 days, will monitor as appropriate      # Hypoalbuminemia: Lowest albumin = 3.3 g/dL at 7/23/2024  9:11 PM, will monitor as appropriate    # Drug Induced Platelet Defect: home medication list includes an antiplatelet medication

## 2024-07-24 NOTE — PROGRESS NOTES
S: Pt reporting rectal pressure with contractions. Not present in between.   Previously had episode of vomiting.     O:  Blood pressure 115/73, temperature 98.5  F (36.9  C), temperature source Oral, resp. rate 18, last menstrual period 10/17/2023.      Baseline rate 155, normal  Variability moderate  Accelerations present   Decelerations present, deceleration type: early, deceleration frequency: intermittent. Are the decelerations significant?   No    CONTRACTIONS: every 3-6 minutes.  Palpate: MVUs 120-185  Pitocin- 10 mu/min.- IV then infiltrated,restarted at 5mu when new IV placed    Antibiotics- none    ROM: clear fluid  PELVIC EXAM:deferred- IUPC and FSE in place    # Pain Assessment:      7/24/2024     1:05 PM   Current Pain Score   Patient currently in pain? yes   - Lenka is experiencing pain due to contractions, pressure. Pain management was discussed and the plan was created in a collaborative fashion.  Lenka's response to the current recommendations: engaged  - IV fentanyl       Assessment: EFM interpretation suggests absence of concern for fetal metabolic acidemia at this time due to accelerations present and variability: moderate    Labor course:  7/23/24 2030 SVE 1/80/-2  2030 Araiza bulb placed  2210 fluid bolus given, wandering baseline  2343 balloon out on own  7/24/24:  0040 pit started  0520 7 mu pit  Prior to 0743: baseline 145, moderate variability, +accels, no decels  0743- 0810: baseline 150, minimal variability, no decels  Pt up to bathroom then on birthing ball, attempting to trace with EFM  0910- 4/70/-2, mid/med, mao 8, minimal variability  0900- minimal with short periods of moderate  0930- 250ml LR bolus given  1000- COVID +  1018- moderate variability  1032- pitocin decreased by 1/2 to 5mu  1040- AROM clear  1043- FSE placed, 4/70/-2, OP, moderate variability  1051- pitocin off  1215- cervix unchanged, IUPC placed, FSE replaced   1235- FSE replaced  1239- 100mcg fentanyl  #1  1313- pitocin restarted at 2 mu  1425- fentanyl #2  1622- pitocin 10mu, mvys 140-185  1658- IV infiltrated, 1705- pitocin restarted at 5mu  1700- IV fentanyl #3    A:  ==============  Lenka SCHUSTER Johny Adams  28 year old   IUP @ 40w1d    Fetal Heart rate tracing  category one  GBS- negative    Patient Active Problem List   Diagnosis    Prepregnancy BMI 43- early 1hr Growth 28, 34, weekly BPP 34 weeks IOL 39-39+6- 7/23 @ 1930    HRP (high risk pregnancy), unspecified trimester    Need for immunization against rubella    Maternal varicella, non-immune    Vitamin D deficiency    Obesity complicating pregnancy    Encounter for induction of labor          P:  ===========  IV infiltrated.   New IV placed. Pitocin restarted at 5mu (1/2 of previous dose).  Titrate to adequate contraction pattern.  3rd dose of IV fentanyl given. Continue to discuss pain management options. Epidural per pt request.     ERIC Boo CNM    Addendum @ 1720:    Tachycardia at 1639- . Moderate variability, early decels.   250 IV fluid bolus administered  Baseline now 155-160.     Continue to monitor closely. Plan cervical exam.     ERIC Boo CNM

## 2024-07-24 NOTE — PROVIDER NOTIFICATION
07/24/24 4217   Provider Notification   Provider Name/Title Lynsey SELAM   Method of Notification At Bedside   Request Evaluate in Person   Notification Reason Labor Status;Status Update;SVE     CNM to bedside to evaluate patient progress and status. Pt reports improvement in pain from IV Fentanyl. Pt reports feeling pain in both front and back. RN assisting pt with position changes. Pt agreeable to SVE, per CNM is 5/80%/-2. CNM to order mucinex for congestion. Pt tearful at this time, reports exhaustion. Plan remains to continue with pitocin infusion, ensuring adequate MVU/up titrating as able.

## 2024-07-24 NOTE — PROGRESS NOTES
SUBJECTIVE:  ==============  Lenkadexter Adams  Estimated Date of Delivery: 2024  General appearance: comfortable  Support: family at bedside      OBJECTIVE:  ==============  VITALS  Blood pressure 120/70, temperature 97.8  F (36.6  C), temperature source Oral, resp. rate 18, last menstrual period 10/17/2023.  Patient Vitals for the past 24 hrs:   BP Temp Temp src Resp   24 2330 120/70 97.8  F (36.6  C) Oral 18   24 134/75 98.4  F (36.9  C) Oral 18       FETAL HEART RATE ASSESSMENT:  Baseline rate 125, normal  Variability moderate  Accelerations present   Decelerations not present       CONTRACTIONS: Contractions irregular, mild  Pitocin- 2 mu/min.,  Antibiotics- none    ROM: clear fluid  PELVIC EXAM:deferred    # Pain Assessment:      2024    11:30 PM   Current Pain Score   Patient currently in pain? yes   Lenka s pain level was assessed and she currently denies pain.        Assessment: EFM interpretation suggests absence of concern for fetal metabolic acidemia at this time due to accelerations present, decelerations absent, and variability: moderate      Labor course:   SVE 1/80/-2   Araiza bulb placed  2210 fluid bolus given, wandering baseline  2343 balloon out on own  0040 pit started    ASSESSMENT:  ==============  Lenka Adams  28 year old  female  Estimated Date of Delivery: 2024  IUP @ 40w1d  for induction of labor.  Indication: maternal indication obesity     Fetal Heart Rate Tracing category one over the last 120 minutes  GBS- negative    Patient Active Problem List   Diagnosis    Prepregnancy BMI 43- early 1hr Growth 28, 34, weekly BPP 34 weeks IOL 39-39+6-  @ 1930    HRP (high risk pregnancy), unspecified trimester    Need for immunization against rubella    Maternal varicella, non-immune    Vitamin D deficiency    Obesity complicating pregnancy    Encounter for induction of labor          PLAN:  ===========    -Anticipate  progress and NSVB.   -Reevaluate progress in 4 hours or sooner with a change in status.  Encourage position changes  - continue Pitocin as needed.     ERIC SMYTH CNM

## 2024-07-24 NOTE — PROGRESS NOTES
S: Notified by RN that FSE is off, difficulty tracing with EFM initially, now tracing well. Pt has felt contractions space but notes they are still painful. Agreeable to exam with placement of IUPC and replacement of FSE.    Pt feels tired, congestion, sore throat.    O:  Blood pressure 110/68, temperature 98  F (36.7  C), temperature source Oral, resp. rate 18, last menstrual period 10/17/2023.      Baseline rate 155, normal  Variability moderate  Accelerations present   Decelerations not present       CONTRACTIONS: every 3-6 minutes.  Palpate: mild to moderate  Pitocin- none,  Antibiotics- none    ROM: clear fluid  PELVIC EXAM: 4/70/-2, OP?, caput,  not well applied to cervix    IUPC placed, FSE replaced then replaced again after falling off during contraction    # Pain Assessment:      7/24/2024     7:50 AM   Current Pain Score   Patient currently in pain? yes   - Lenka is experiencing pain due to contractions. Pain management was discussed and the plan was created in a collaborative fashion.  Lenka's response to the current recommendations: engaged  - 100mcg IV fentanyl given      Assessment: EFM interpretation suggests absence of concern for fetal metabolic acidemia at this time due to accelerations present, heart rate: normal baseline, and variability: moderate    Labor course:  7/23/24 2030 SVE 1/80/-2  2030 Araiza bulb placed  2210 fluid bolus given, wandering baseline  2343 balloon out on own  7/24/24:  0040 pit started  0520 7 mu pit  Prior to 0743: baseline 145, moderate variability, +accels, no decels  0743- 0810: baseline 150, minimal variability, no decels  Pt up to bathroom then on birthing ball, attempting to trace with EFM  0910- 4/70/-2, mid/med, mao 8, minimal variability  0900- minimal with short periods of moderate  0930- 250ml LR bolus given  1000- COVID +  1018- moderate variability  1032- pitocin decreased by 1/2 to 5mu  1040- AROM clear  1043- FSE placed, 4/70/-2, OP, moderate  variability  1051- pitocin off  1215- cervix unchanged, IUPC placed, FSE replaced   1235- FSE replaced  1239- 100mcg fentanyl  1313- pitocin restarted at 2 mu    A:  ==============  Lenka SCHUSTER oJhny Adams  28 year old   IUP @ 40w1d IOL pre-preg BMI 43   Fetal Heart rate tracing  category one over the last 120 minutes  GBS- negative    COVID positive    Patient Active Problem List   Diagnosis    Prepregnancy BMI 43- early 1hr Growth 28, 34, weekly BPP 34 weeks IOL 39-39+6- 7/23 @ 1930    HRP (high risk pregnancy), unspecified trimester    Need for immunization against rubella    Maternal varicella, non-immune    Vitamin D deficiency    Obesity complicating pregnancy    Encounter for induction of labor          P:  ===========  See above.   Pain management prn per maternal request.   If remains cat 1 over the next 20 minutes, will restart pitocin at 2mu and titrate to adequate MVUs.  Pt agreeable.   Reevaluate prn per maternal/fetal indications.    Lynsey Blunt, APRN, CNM

## 2024-07-24 NOTE — PROGRESS NOTES
S:  Pt has received 3 doses of IV fentanyl. Feels the last dose did not help as much as the first. Tearful, feeling exhausted, discouraged with length of IOL.      O:  Blood pressure 112/57, temperature 98.4  F (36.9  C), temperature source Oral, resp. rate 18, last menstrual period 10/17/2023.    Baseline rate 150, normal  Variability moderate  Accelerations present   Decelerations present, deceleration type: early, deceleration frequency: intermittent. Are the decelerations significant?   No    CONTRACTIONS: every 3-5 minutes.  Palpate: -160  Pitocin- 7 mu/min.,  Antibiotics- none    ROM: clear fluid  PELVIC EXAM: 5/80/-2, caput, asynclitic, not well engaged    # Pain Assessment:      7/24/2024     5:52 PM   Current Pain Score   Patient currently in pain? yes   - Lenka is experiencing pain due to contractions. Pain management was discussed and the plan was created in a collaborative fashion.  Lenka's response to the current recommendations: engaged  - IV fentanyl x 3      Assessment: EFM interpretation suggests absence of concern for fetal metabolic acidemia at this time due to accelerations present and variability: moderate      Labor course:  7/23/24  2030 SVE 1/80/-2, mao 10  2030 Araiza bulb placed; 2343 balloon out   7/24/24:  0040 pit started  0910- 4/70/-2, mid/med, mao 8  1032- pitocin decreased by 1/2 to 5mu (d/t min variability)  1040- AROM clear  1043- FSE placed, 4/70/-2, OP  1051- pitocin off  1215- cervix unchanged, IUPC placed, FSE replaced & at 1235  1239- 100mcg fentanyl #1  1313- pitocin restarted at 2 mu  1425- fentanyl #2  1622- pitocin 10mu, mvus 140-185  1658- IV infiltrated, 1705- pitocin restarted at 5mu, 1700- IV fentanyl #3  1804- 4-5/80/-2, pit at 7, mucinex ordered    A:  ==============  Lenka Mcclain Angie  28 year old   IUP @ 40w1d IOL BMI 43   Fetal Heart rate tracing  category one  GBS- negative    Patient Active Problem List   Diagnosis    Prepregnancy BMI 43-  early 1hr Growth 28, 34, weekly BPP 34 weeks IOL 39-39+6- 7/23 @ 1930    HRP (high risk pregnancy), unspecified trimester    Need for immunization against rubella    Maternal varicella, non-immune    Vitamin D deficiency    Obesity complicating pregnancy    Encounter for induction of labor       P:  ===========  Continue pitocin titration to adequate contraction pattern and MVUs.   Mucinex ordered.   Also need to discuss paxlovid with patient. Will notify oncoming CNM.  Reevaluate prn per maternal/fetal indications.    ERIC Boo, CNM

## 2024-07-24 NOTE — PROGRESS NOTES
S: Pt feeling more uncomfortable over the last 30 minutes. Has been in side lying, up on ball. Open to cervical exam.     O:  Blood pressure 118/75, temperature 97.8  F (36.6  C), temperature source Oral, resp. rate 18, last menstrual period 10/17/2023.    Baseline rate 150, normal- difficulty tracing continuously d/t movement and body habitus  Variability periods of minimal and moderate  Accelerations not present  Decelerations not present       CONTRACTIONS: every 2-3 minutes.  Palpate: moderate  Pitocin- 10 mu/min.,  Antibiotics- none    ROM: not ruptured  PELVIC EXAM: 4/70/-2, mid/med, BOW taut with contractions, caput noted; Mao 8  Vertex by BSUS, appears OA    # Pain Assessment:      7/24/2024     7:50 AM   Current Pain Score   Patient currently in pain? yes   - Lenka is experiencing pain due to contractions. Pain management was discussed and the plan was created in a collaborative fashion.  Lenka's response to the current recommendations: engaged  - position changes, breathing      Assessment: EFM interpretation suggests absence of concern for fetal metabolic acidemia at this time due to periods of variability: moderate     The interventions currently taken to improve the fetal heart rate tracing and fetal oxygenation are: maternal positioning, IV fluid bolus , and sterile vaginal exam    Labor course:  7/23/24 2030 SVE 1/80/-2  2030 Araiza bulb placed  2210 fluid bolus given, wandering baseline  2343 balloon out on own  0040 pit started  0520 7 mu pit  0910- 4/70/-2, mid/med, mao 8, 250ml bolus, minimal variability    A:  ==============  Lenka Kilgoreela  28 year old   IUP @ 40w1d IOL BMI 43   Fetal Heart rate tracing intermittent category two over the last 60 minutes- periods of minimal and moderate variability  GBS- negative    Patient Active Problem List   Diagnosis    Prepregnancy BMI 43- early 1hr Growth 28, 34, weekly BPP 34 weeks IOL 39-39+6- 7/23 @ 1930    HRP (high risk  pregnancy), unspecified trimester    Need for immunization against rubella    Maternal varicella, non-immune    Vitamin D deficiency    Obesity complicating pregnancy    Encounter for induction of labor          P:  ===========  Cervical change.  Pitocin at 10mu- keep at this rate as ctx in regular pattern. Titrate prn.  250ml fluid bolus to be started.  Discussed consideration of AROM in 2-4 hours if appropriate.  Reevaluate prn per maternal/fetal indications.      ERIC Boo, CNM

## 2024-07-24 NOTE — PROVIDER NOTIFICATION
07/24/24 1015   Provider Notification   Provider Name/Title Lynsey VELEZ SELAM   Method of Notification At Bedside   Request Evaluate in Person   Notification Reason Status Update;ZAIN Perduerah CNM to bedside to discuss progress and evaluate patient status. Pt becoming increasingly uncomfortable. Requesting nitrous, however covid tested positive, and per policy unable to use nitrous. RN assisting with position changes, using birthing ball. FHT difficult to consistently monitor, additional nurses to bedside to assist. CNM requesting ivelisse, however no patches on unit. RN called  who did not answer. FHT with minimal variability, CNM recommending FSE placement to better monitor baby HR. Pt agreeable. Pitocin gtt cut in half. FSE placed, AROM for small amount of fluid. Pitocin gtt discontinued per CNM. Pt placed on hands and knees. Presently, FHT category 1. CNM discussed pt with MD Bowser. Plan for CNM to return to bedside after going to postpartum, may place IUPC with restarting pitocin.

## 2024-07-24 NOTE — H&P
"ADMIT NOTE  =================  40w0d    Lenka Adams is a 28 year old female with an Patient's last menstrual period was 10/17/2023. and Estimated Date of Delivery: 2024 [facility] at 8:53 PM for induction of labor.  Indication: maternal indication BMI of 43.     HPI  ================  Patient is a  at 40w0d here for IOL for BMI of 43. Last BPP 24 6/8 for fetal breathing. NST reactive.     Contractions- none  Fetal movement- active  ROM- no.  Vaginal bleeding- none  GBS- negative  FOB- is involved, Tunde  Total weight gain 317-277=  40 lbs  Height- 5'6.93\"  BMI- 43  First prenatal visit at 11w6d weeks, Total visits- 15    PROBLEM LIST  =================  Patient Active Problem List    Diagnosis Date Noted    Need for immunization against rubella 2024     Priority: Medium    Maternal varicella, non-immune 2024     Priority: Medium    Vitamin D deficiency 2024     Priority: Medium     Noted to be 24 at initial OB visit    3/3: taking supplementation      HRP (high risk pregnancy), unspecified trimester 2024     Priority: Medium     [facility] Patient Provider Group choice: CNM group  Partner's name: Tunde  [x]UMESH folder  [x]Dating  [x] 1st trimester screening: MFM referral placed for 1st trimester screening place  [x]Offer AFP after 15 wks  [x]Fetal anatomy US ordered  [x]Rubella NON-immune, accepts vaccination postpartum  [x]Hep B immune   [x]Varicella NON-immune, accepts vaccination pp  [x]Pap collected at initial OB visit.  [x] recommended LD ASA after 12 wks for PRE-E risk  [x] GDM risk, recommended early GCT and hgb A1C  [x]No need for utox in labor  [x]COVID vaccine completed  _____________________________________  [x]EOB folder  [x]PP Contraception plan: undecided  [x]Labor plans: unmedicated, but open  [x]: interested, resources given  [x]Infant feeding plan: breast  [x]FLU shot  [x]TDAP "   []RSV  [x]GCT  ________________________________________  [x] OTC PP meds - at home  [x]PP plans: 12 maternity leave  []Planning CS-ERAS pkt       Prepregnancy BMI 43- early 1hr Growth 28, 34, weekly BPP 34 weeks IOL 39-39+6-  @ 2023     Priority: Medium     IOL recommended btwn 39-39w6d for obesity     : prefers spontaneous, iol if gets to melanie,  EFW 17.8%tile  Bpp and growth , bpp 7/16  7/3- bpp 2024:  - growth at 28 and 34 weeks  followed by weekly  testing starting at 34 weeks due to maternal BMI >40.    Scheduled for  @          HISTORIES  ============  Allergies   Allergen Reactions    Clindamycin Hives and Rash     Past Medical History:   Diagnosis Date    Class 3 severe obesity with serious comorbidity and body mass index (BMI) of 45.0 to 49.9 in adult (H) 2023     History reviewed. No pertinent surgical history..  Family History   Problem Relation Age of Onset    Diabetes Mother     Diabetes Father     No Known Problems Maternal Grandmother     No Known Problems Maternal Grandfather     No Known Problems Paternal Grandmother     No Known Problems Paternal Grandfather     No Known Problems Brother     No Known Problems Brother     No Known Problems Sister     No Known Problems Sister     No Known Problems Sister      Social History     Tobacco Use    Smoking status: Never     Passive exposure: Never    Smokeless tobacco: Never   Substance Use Topics    Alcohol use: Not Currently     OB History    Para Term  AB Living   1 0 0 0 0 0   SAB IAB Ectopic Multiple Live Births   0 0 0 0 0      # Outcome Date GA Lbr Rex/2nd Weight Sex Type Anes PTL Lv   1 Current               Obstetric Comments   First pregnancy        LABS:   ===========  Prenatal Labs:  Rhogam not indicated   Lab Results   Component Value Date    AS Negative 2024    RUQIGG No detectable antibody. 2024    HEPBANG Nonreactive 2024    HGB 11.7 2024     HIAGAB Nonreactive 01/08/2024    GLU1 122 04/30/2024     Rubella Non Immune  GBS Negative    COVID-19 PCR Results           No data to display              COVID-19 Antibody Results, Testing for Immunity           No data to display               No results found for this or any previous visit (from the past 24 hour(s)).    ROS  =========  Pt denies significant respiratory, cardiovacular, GI, or muscular/skeletalcomplaints.    See RN data base ROS.       PHYSICAL EXAM:  ===============  /75 (BP Location: Left arm, Patient Position: Semi-Verdugo's, Cuff Size: Adult Large)   Temp 98.4  F (36.9  C) (Oral)   Resp 18   LMP 10/17/2023   General appearance: comfortable  GENERAL APPEARANCE: healthy, alert and no distress  RESP: lungs clear to auscultation   CV: regular rates and rhythm,  ABDOMEN:  soft, nontender, no epigastric pain  SKIN: no suspicious lesions or rashes  NEURO: Denies headache, blurred vision, other vision changes  PSYCH: mentation appears normal. and affect normal/bright  Legs: Reflexes normal bilaterally     Abdomen: gravid, vertex fetus per Leopold's, non-tender between contractions.   Cephalic presentation confirmed by BSUS  EFW-  7# 12oz lbs.   CONTRACTIONS: none  FETAL HEART TONES: Intermittent EFM- baseline 115 with moderate variability and positive accelerations. No decelerations   PELVIC EXAM: 1/ 80%/ Anterior/ soft/ -1   BATRES SCORE: 10  BLOODY SHOW: no   ROM:no  FLUID: none  ROMPLUS: not done    PROCEDURE:  Araiza ripening balloon placed by hand. Inflated with 75 ml of fluid. Patient and fetus tolerated well.       ASSESSMENT:  ==============  IUP @ 40w0d admitted for induction of labor.  Indication: maternal indication BMI of 43   NST REACTIVE  Fetal Heart Tones - category one  GBS- negative    Patient Active Problem List   Diagnosis    Prepregnancy BMI 43- early 1hr Growth 28, 34, weekly BPP 34 weeks IOL 39-39+6- 7/23 @ 1930    HRP (high risk pregnancy), unspecified trimester    Need  for immunization against rubella    Maternal varicella, non-immune    Vitamin D deficiency         PLAN:  ===========  Admit - see IP orders  cervical ripening with cervical burr bulb risks and benefits reviewed with pt. Agreeable to plan.  Labor induction with Burr bulb risks and benefits reviewed with pt. Agreeable to plan  Ambulation, hydration, position changes, birthing ball and tub options to facilitate labor reviewed with pt .  Anticipate       I, Ya RIOS, am serving as a scribe; to document services personally performed by  Hannah Banks CNM based on data collection and the provider's statements to me.     Ya RIOS    I agree with the PFSH and ROS as completed by the student, except for changes made by me. The remainder of the encounter scribed by the student. The scribed note accurately reflects my personal services and decisions made by me.  ERIC SMYTH CNM

## 2024-07-24 NOTE — PROVIDER NOTIFICATION
07/24/24 0852   Provider Notification   Provider Name/Title Lynsey VELEZ SELAM   Method of Notification At Bedside   Request Evaluate in Person   Notification Reason Labor Status;Status Update;SVE     CNM to bedside. SVE performed, 4/70%/-2. Pt becoming more uncomfortable within past ~30 minutes, contractions every 2-3 minutes. Pitocin gtt 10 mL/hr. Currently minimal variability on FHT, giving 250 mL bolus per CNM. Pt with respiratory symptoms, covid/respiratory panel sent. Plan to reevaluate in few hours.

## 2024-07-24 NOTE — PROGRESS NOTES
S:   Reviewed cat 2 tracing with RN at unit and bedside.     Pt becoming more uncomfortable, asking for nitrous oxide. Pt changing positions, breathing through contractions. Difficult to trace FHR continuously.    Of note, pt's COVID test resulted as positive.     O:   Vitals:   Vitals:    07/24/24 0300 07/24/24 0535 07/24/24 0750 07/24/24 1100   BP: 119/64 112/58 118/75 110/68   BP Location: Right arm Right arm Right arm    Patient Position: Semi-Verdugo's Sitting Right side    Cuff Size: Adult Large Adult Large Adult Large    Resp: 18 18 18    Temp: 97.8  F (36.6  C) 98.9  F (37.2  C) 97.8  F (36.6  C) 98  F (36.7  C)   TempSrc: Oral Oral Oral Oral     Prior to 0743: baseline 145, moderate variability, +accels, no decels  0743- 0810: baseline 150, minimal variability, no decels  Pt up to bathroom then on birthing ball, attempting to trace with EFM  0900- minimal with short periods of moderate  0910- 4/70/-2  0930- 250ml LR bolus given  1018- moderate variability  1032- pitocin decreased by 1/2 to 5mu  1040- AROM clear  1043- FSE placed, 4/70/-2, OP/asynclitic, moderate variability  1051- pitocin off    CONTRACTIONS: every 2-3 minutes.  Palpate: moderate  Pitocin- reduced from 10mu to 5mu,  Antibiotics- none    ROM: AROM performed at 1040, clear fluid  PELVIC EXAM: 4/70/-2, OP/asynclitic    FSE placed    Labor course:  7/23/24 2030 SVE 1/80/-2  2030 Araiza bulb placed  2210 fluid bolus given, wandering baseline  2343 balloon out on own  7/24/24:  0040 pit started  0520 7 mu pit  Prior to 0743: baseline 145, moderate variability, +accels, no decels  0743- 0810: baseline 150, minimal variability, no decels  Pt up to bathroom then on birthing ball, attempting to trace with EFM  0910- 4/70/-2, mid/med, mao 8, minimal variability  0900- minimal with short periods of moderate  0930- 250ml LR bolus given  1000- COVID +  1018- moderate variability  1032- pitocin decreased by 1/2 to 5mu  1040- AROM clear  1043- FSE  placed, 4/70/-2, OP, moderate variability  1051- pitocin off    A:   Persistent Category II tracing.     P:   Per the Category II algorithm, the plan is to continue to observe and apply intrauterine resuscitation measures as indicated.  Reevaluate in 15 min.    - Discontinuation of nitrous d/t COVID positive status. Respiratory panel in process.   Initiate covid precautions for staff.  - See interventions above.   Tracing reviewed with patient.   - Will consult and have cat 2 huddle with OB MD.     ERIC Boo CNM    Addendum @ 1120:    Patient repositioned to hands and knees.     Consult with OB MD, Dr. Bowser.    Since discontinuation of pitocin, FHR tracing with baseline 150, moderate variability, +accels, intermittent early decels.   Category one.    Difficulty tracing contractions with hands and knees position.     After consult with OB MD, plan continued monitoring. Will place IUPC if unable to trace contractions adequately.  Plan to restart pitocin at 2mu if continued category 1.    Pt verbalized understanding and agrees with plan of care.     ERIC Boo CNM

## 2024-07-24 NOTE — PROGRESS NOTES
Late entry d/t pt care.    S: Pt reporting increased congestion, headache. Partner has had similar symptoms. Also noting sore throat.   Feeling contractions more, some back pain.     O:  Blood pressure 118/75, temperature 97.8  F (36.6  C), temperature source Oral, resp. rate 18, last menstrual period 10/17/2023.      Baseline rate 140, normal  Variability moderate  Accelerations present   Decelerations not present     CONTRACTIONS: every 3-5 minutes.  Palpate: mild to moderate  Pitocin- 10 mu/min.,  Antibiotics- none    ROM: not ruptured  PELVIC EXAM:deferred    # Pain Assessment:      7/24/2024     7:50 AM   Current Pain Score   Patient currently in pain? yes   - Lenka is experiencing pain due to contractions. Pain management was discussed and the plan was created in a collaborative fashion.  Lenka's response to the current recommendations: engaged  - position changes      Assessment: EFM interpretation suggests absence of concern for fetal metabolic acidemia at this time due to accelerations present and variability: moderate      Labor course:  7/23/24 2030 SVE 1/80/-2  2030 Araiza bulb placed  2210 fluid bolus given, wandering baseline  2343 balloon out on own  0040 pit started  0520 7 mu pit    A:  ==============  Lenka SCHUSTER Johny Adams  28 year old   IUP @ 40w1d IOL BMI 43   Fetal Heart rate tracing  category one   GBS- negative    Patient Active Problem List   Diagnosis    Prepregnancy BMI 43- early 1hr Growth 28, 34, weekly BPP 34 weeks IOL 39-39+6- 7/23 @ 1930    HRP (high risk pregnancy), unspecified trimester    Need for immunization against rubella    Maternal varicella, non-immune    Vitamin D deficiency    Obesity complicating pregnancy    Encounter for induction of labor          P:  ===========  Continue pitocin titration to adequate contraction pattern.   COVID PCR and respiratory panel ordered.  Reevaluate prn per maternal/fetal indications.     Lynsey Blunt, ERIC, CNM

## 2024-07-24 NOTE — PLAN OF CARE
VSS. See flowsheets for FHR and Uterine patterns and assessments. Patient slept on and off throughout the night. Patient denies pain, headache, vision changes, URQ epigastric pain, bleeding, and leaking of fluids. Pitocin infusing. All questions and concerns answered and addressed. Support persons at bedside. Call light in reach. Patient educated on informing staff of any changes. Will continue to monitor and notify provider if change of status. Report given to Latricia ROCHE RN.

## 2024-07-25 PROBLEM — R03.0 ELEVATED BLOOD PRESSURE READING WITHOUT DIAGNOSIS OF HYPERTENSION: Status: ACTIVE | Noted: 2024-07-25

## 2024-07-25 PROBLEM — U07.1 INFECTION DUE TO 2019 NOVEL CORONAVIRUS: Status: ACTIVE | Noted: 2024-07-25

## 2024-07-25 LAB
ALT SERPL W P-5'-P-CCNC: 23 U/L (ref 0–50)
AST SERPL W P-5'-P-CCNC: 17 U/L (ref 0–45)
CREAT SERPL-MCNC: 0.61 MG/DL (ref 0.51–0.95)
EGFRCR SERPLBLD CKD-EPI 2021: >90 ML/MIN/1.73M2
ERYTHROCYTE [DISTWIDTH] IN BLOOD BY AUTOMATED COUNT: 15.3 % (ref 10–15)
HCT VFR BLD AUTO: 31.2 % (ref 35–47)
HGB BLD-MCNC: 10.2 G/DL (ref 11.7–15.7)
MCH RBC QN AUTO: 27 PG (ref 26.5–33)
MCHC RBC AUTO-ENTMCNC: 32.7 G/DL (ref 31.5–36.5)
MCV RBC AUTO: 83 FL (ref 78–100)
PLATELET # BLD AUTO: 190 10E3/UL (ref 150–450)
RBC # BLD AUTO: 3.78 10E6/UL (ref 3.8–5.2)
WBC # BLD AUTO: 12.2 10E3/UL (ref 4–11)

## 2024-07-25 PROCEDURE — 250N000009 HC RX 250: Performed by: MIDWIFE

## 2024-07-25 PROCEDURE — 250N000011 HC RX IP 250 OP 636: Performed by: STUDENT IN AN ORGANIZED HEALTH CARE EDUCATION/TRAINING PROGRAM

## 2024-07-25 PROCEDURE — 250N000009 HC RX 250

## 2024-07-25 PROCEDURE — 258N000003 HC RX IP 258 OP 636

## 2024-07-25 PROCEDURE — 120N000002 HC R&B MED SURG/OB UMMC

## 2024-07-25 PROCEDURE — 82565 ASSAY OF CREATININE: CPT

## 2024-07-25 PROCEDURE — 271N000001 HC OR GENERAL SUPPLY NON-STERILE: Performed by: OBSTETRICS & GYNECOLOGY

## 2024-07-25 PROCEDURE — 360N000076 HC SURGERY LEVEL 3, PER MIN: Performed by: OBSTETRICS & GYNECOLOGY

## 2024-07-25 PROCEDURE — 258N000003 HC RX IP 258 OP 636: Performed by: NURSE PRACTITIONER

## 2024-07-25 PROCEDURE — 258N000003 HC RX IP 258 OP 636: Performed by: STUDENT IN AN ORGANIZED HEALTH CARE EDUCATION/TRAINING PROGRAM

## 2024-07-25 PROCEDURE — 250N000009 HC RX 250: Performed by: STUDENT IN AN ORGANIZED HEALTH CARE EDUCATION/TRAINING PROGRAM

## 2024-07-25 PROCEDURE — 250N000011 HC RX IP 250 OP 636

## 2024-07-25 PROCEDURE — 370N000017 HC ANESTHESIA TECHNICAL FEE, PER MIN: Performed by: OBSTETRICS & GYNECOLOGY

## 2024-07-25 PROCEDURE — 85027 COMPLETE CBC AUTOMATED: CPT

## 2024-07-25 PROCEDURE — 84450 TRANSFERASE (AST) (SGOT): CPT

## 2024-07-25 PROCEDURE — 84460 ALANINE AMINO (ALT) (SGPT): CPT

## 2024-07-25 PROCEDURE — C9290 INJ, BUPIVACAINE LIPOSOME: HCPCS | Performed by: STUDENT IN AN ORGANIZED HEALTH CARE EDUCATION/TRAINING PROGRAM

## 2024-07-25 PROCEDURE — 710N000009 HC RECOVERY PHASE 1, LEVEL 1, PER MIN: Performed by: OBSTETRICS & GYNECOLOGY

## 2024-07-25 PROCEDURE — 36415 COLL VENOUS BLD VENIPUNCTURE: CPT

## 2024-07-25 PROCEDURE — 59510 CESAREAN DELIVERY: CPT | Mod: GC | Performed by: OBSTETRICS & GYNECOLOGY

## 2024-07-25 PROCEDURE — 250N000013 HC RX MED GY IP 250 OP 250 PS 637

## 2024-07-25 PROCEDURE — 250N000013 HC RX MED GY IP 250 OP 250 PS 637: Performed by: ADVANCED PRACTICE MIDWIFE

## 2024-07-25 PROCEDURE — 250N000013 HC RX MED GY IP 250 OP 250 PS 637: Performed by: NURSE PRACTITIONER

## 2024-07-25 PROCEDURE — 250N000011 HC RX IP 250 OP 636: Performed by: NURSE PRACTITIONER

## 2024-07-25 PROCEDURE — 272N000001 HC OR GENERAL SUPPLY STERILE: Performed by: OBSTETRICS & GYNECOLOGY

## 2024-07-25 PROCEDURE — 250N000013 HC RX MED GY IP 250 OP 250 PS 637: Performed by: OBSTETRICS & GYNECOLOGY

## 2024-07-25 RX ORDER — LOPERAMIDE HCL 2 MG
2 CAPSULE ORAL
Status: DISCONTINUED | OUTPATIENT
Start: 2024-07-25 | End: 2024-07-25

## 2024-07-25 RX ORDER — AMOXICILLIN 250 MG
2 CAPSULE ORAL 2 TIMES DAILY
Status: DISCONTINUED | OUTPATIENT
Start: 2024-07-25 | End: 2024-07-27 | Stop reason: HOSPADM

## 2024-07-25 RX ORDER — ENOXAPARIN SODIUM 100 MG/ML
40 INJECTION SUBCUTANEOUS EVERY 12 HOURS
Status: DISCONTINUED | OUTPATIENT
Start: 2024-07-26 | End: 2024-07-27 | Stop reason: HOSPADM

## 2024-07-25 RX ORDER — PROCHLORPERAZINE 25 MG
25 SUPPOSITORY, RECTAL RECTAL EVERY 12 HOURS PRN
Status: DISCONTINUED | OUTPATIENT
Start: 2024-07-25 | End: 2024-07-27 | Stop reason: HOSPADM

## 2024-07-25 RX ORDER — DIPHENHYDRAMINE HYDROCHLORIDE 50 MG/ML
25 INJECTION INTRAMUSCULAR; INTRAVENOUS EVERY 6 HOURS PRN
Status: DISCONTINUED | OUTPATIENT
Start: 2024-07-25 | End: 2024-07-27 | Stop reason: HOSPADM

## 2024-07-25 RX ORDER — DEXTROSE, SODIUM CHLORIDE, SODIUM LACTATE, POTASSIUM CHLORIDE, AND CALCIUM CHLORIDE 5; .6; .31; .03; .02 G/100ML; G/100ML; G/100ML; G/100ML; G/100ML
INJECTION, SOLUTION INTRAVENOUS CONTINUOUS
Status: DISCONTINUED | OUTPATIENT
Start: 2024-07-25 | End: 2024-07-27 | Stop reason: HOSPADM

## 2024-07-25 RX ORDER — MISOPROSTOL 200 UG/1
800 TABLET ORAL
Status: DISCONTINUED | OUTPATIENT
Start: 2024-07-25 | End: 2024-07-27 | Stop reason: HOSPADM

## 2024-07-25 RX ORDER — LOPERAMIDE HCL 2 MG
4 CAPSULE ORAL
Status: DISCONTINUED | OUTPATIENT
Start: 2024-07-25 | End: 2024-07-25

## 2024-07-25 RX ORDER — LOPERAMIDE HCL 2 MG
4 CAPSULE ORAL
Status: DISCONTINUED | OUTPATIENT
Start: 2024-07-25 | End: 2024-07-27 | Stop reason: HOSPADM

## 2024-07-25 RX ORDER — DIPHENHYDRAMINE HCL 25 MG
25 CAPSULE ORAL EVERY 6 HOURS PRN
Status: DISCONTINUED | OUTPATIENT
Start: 2024-07-25 | End: 2024-07-27 | Stop reason: HOSPADM

## 2024-07-25 RX ORDER — CEFAZOLIN SODIUM/WATER 2 G/20 ML
SYRINGE (ML) INTRAVENOUS
Status: DISCONTINUED
Start: 2024-07-25 | End: 2024-07-25 | Stop reason: WASHOUT

## 2024-07-25 RX ORDER — OXYTOCIN/0.9 % SODIUM CHLORIDE 30/500 ML
340 PLASTIC BAG, INJECTION (ML) INTRAVENOUS CONTINUOUS PRN
Status: DISCONTINUED | OUTPATIENT
Start: 2024-07-25 | End: 2024-07-27 | Stop reason: HOSPADM

## 2024-07-25 RX ORDER — SIMETHICONE 80 MG
80 TABLET,CHEWABLE ORAL 4 TIMES DAILY PRN
Status: DISCONTINUED | OUTPATIENT
Start: 2024-07-25 | End: 2024-07-27 | Stop reason: HOSPADM

## 2024-07-25 RX ORDER — AZITHROMYCIN 500 MG/5ML
500 INJECTION, POWDER, LYOPHILIZED, FOR SOLUTION INTRAVENOUS
Status: DISCONTINUED | OUTPATIENT
Start: 2024-07-25 | End: 2024-07-25

## 2024-07-25 RX ORDER — OXYTOCIN 10 [USP'U]/ML
10 INJECTION, SOLUTION INTRAMUSCULAR; INTRAVENOUS
Status: DISCONTINUED | OUTPATIENT
Start: 2024-07-25 | End: 2024-07-27 | Stop reason: HOSPADM

## 2024-07-25 RX ORDER — CITRIC ACID/SODIUM CITRATE 334-500MG
SOLUTION, ORAL ORAL
Status: COMPLETED
Start: 2024-07-25 | End: 2024-07-25

## 2024-07-25 RX ORDER — TRANEXAMIC ACID 10 MG/ML
1 INJECTION, SOLUTION INTRAVENOUS EVERY 30 MIN PRN
Status: DISCONTINUED | OUTPATIENT
Start: 2024-07-25 | End: 2024-07-27 | Stop reason: HOSPADM

## 2024-07-25 RX ORDER — TRANEXAMIC ACID 10 MG/ML
1 INJECTION, SOLUTION INTRAVENOUS EVERY 30 MIN PRN
Status: DISCONTINUED | OUTPATIENT
Start: 2024-07-25 | End: 2024-07-25

## 2024-07-25 RX ORDER — OXYTOCIN 10 [USP'U]/ML
10 INJECTION, SOLUTION INTRAMUSCULAR; INTRAVENOUS
Status: DISCONTINUED | OUTPATIENT
Start: 2024-07-25 | End: 2024-07-25

## 2024-07-25 RX ORDER — METHYLERGONOVINE MALEATE 0.2 MG/ML
200 INJECTION INTRAVENOUS
Status: DISCONTINUED | OUTPATIENT
Start: 2024-07-25 | End: 2024-07-25

## 2024-07-25 RX ORDER — CARBOPROST TROMETHAMINE 250 UG/ML
250 INJECTION, SOLUTION INTRAMUSCULAR
Status: DISCONTINUED | OUTPATIENT
Start: 2024-07-25 | End: 2024-07-25

## 2024-07-25 RX ORDER — OXYCODONE HYDROCHLORIDE 5 MG/1
5 TABLET ORAL EVERY 4 HOURS PRN
Status: DISCONTINUED | OUTPATIENT
Start: 2024-07-25 | End: 2024-07-27 | Stop reason: HOSPADM

## 2024-07-25 RX ORDER — KETOROLAC TROMETHAMINE 30 MG/ML
30 INJECTION, SOLUTION INTRAMUSCULAR; INTRAVENOUS EVERY 6 HOURS PRN
Status: COMPLETED | OUTPATIENT
Start: 2024-07-25 | End: 2024-07-26

## 2024-07-25 RX ORDER — CITRIC ACID/SODIUM CITRATE 334-500MG
30 SOLUTION, ORAL ORAL
Status: DISCONTINUED | OUTPATIENT
Start: 2024-07-25 | End: 2024-07-25

## 2024-07-25 RX ORDER — ONDANSETRON 2 MG/ML
4 INJECTION INTRAMUSCULAR; INTRAVENOUS EVERY 6 HOURS PRN
Status: DISCONTINUED | OUTPATIENT
Start: 2024-07-25 | End: 2024-07-27 | Stop reason: HOSPADM

## 2024-07-25 RX ORDER — AMOXICILLIN 250 MG
1 CAPSULE ORAL 2 TIMES DAILY
Status: DISCONTINUED | OUTPATIENT
Start: 2024-07-25 | End: 2024-07-27 | Stop reason: HOSPADM

## 2024-07-25 RX ORDER — LIDOCAINE 40 MG/G
CREAM TOPICAL
Status: DISCONTINUED | OUTPATIENT
Start: 2024-07-25 | End: 2024-07-27 | Stop reason: HOSPADM

## 2024-07-25 RX ORDER — MISOPROSTOL 200 UG/1
800 TABLET ORAL
Status: DISCONTINUED | OUTPATIENT
Start: 2024-07-25 | End: 2024-07-25

## 2024-07-25 RX ORDER — SODIUM CHLORIDE, SODIUM LACTATE, POTASSIUM CHLORIDE, CALCIUM CHLORIDE 600; 310; 30; 20 MG/100ML; MG/100ML; MG/100ML; MG/100ML
INJECTION, SOLUTION INTRAVENOUS CONTINUOUS
Status: DISCONTINUED | OUTPATIENT
Start: 2024-07-25 | End: 2024-07-25

## 2024-07-25 RX ORDER — LOPERAMIDE HCL 2 MG
2 CAPSULE ORAL
Status: DISCONTINUED | OUTPATIENT
Start: 2024-07-25 | End: 2024-07-27 | Stop reason: HOSPADM

## 2024-07-25 RX ORDER — SODIUM CHLORIDE, SODIUM LACTATE, POTASSIUM CHLORIDE, CALCIUM CHLORIDE 600; 310; 30; 20 MG/100ML; MG/100ML; MG/100ML; MG/100ML
INJECTION, SOLUTION INTRAVENOUS CONTINUOUS
Status: DISCONTINUED | OUTPATIENT
Start: 2024-07-25 | End: 2024-07-25 | Stop reason: HOSPADM

## 2024-07-25 RX ORDER — LIDOCAINE HCL/EPINEPHRINE/PF 2%-1:200K
VIAL (ML) INJECTION PRN
Status: DISCONTINUED | OUTPATIENT
Start: 2024-07-25 | End: 2024-07-25

## 2024-07-25 RX ORDER — PROCHLORPERAZINE MALEATE 10 MG
10 TABLET ORAL EVERY 6 HOURS PRN
Status: DISCONTINUED | OUTPATIENT
Start: 2024-07-25 | End: 2024-07-27 | Stop reason: HOSPADM

## 2024-07-25 RX ORDER — MODIFIED LANOLIN
OINTMENT (GRAM) TOPICAL
Status: DISCONTINUED | OUTPATIENT
Start: 2024-07-25 | End: 2024-07-27 | Stop reason: HOSPADM

## 2024-07-25 RX ORDER — ACETAMINOPHEN 325 MG/1
975 TABLET ORAL ONCE
Status: DISCONTINUED | OUTPATIENT
Start: 2024-07-25 | End: 2024-07-25

## 2024-07-25 RX ORDER — CARBOPROST TROMETHAMINE 250 UG/ML
250 INJECTION, SOLUTION INTRAMUSCULAR
Status: DISCONTINUED | OUTPATIENT
Start: 2024-07-25 | End: 2024-07-27 | Stop reason: HOSPADM

## 2024-07-25 RX ORDER — KETOROLAC TROMETHAMINE 30 MG/ML
30 INJECTION, SOLUTION INTRAMUSCULAR; INTRAVENOUS EVERY 6 HOURS
Status: DISCONTINUED | OUTPATIENT
Start: 2024-07-25 | End: 2024-07-25

## 2024-07-25 RX ORDER — ACETAMINOPHEN 325 MG/1
975 TABLET ORAL EVERY 6 HOURS
Status: DISCONTINUED | OUTPATIENT
Start: 2024-07-25 | End: 2024-07-27 | Stop reason: HOSPADM

## 2024-07-25 RX ORDER — CEFAZOLIN SODIUM/WATER 3 G/30 ML
3 SYRINGE (ML) INTRAVENOUS SEE ADMIN INSTRUCTIONS
Status: DISCONTINUED | OUTPATIENT
Start: 2024-07-25 | End: 2024-07-25

## 2024-07-25 RX ORDER — ONDANSETRON 4 MG/1
4 TABLET, ORALLY DISINTEGRATING ORAL EVERY 30 MIN PRN
Status: DISCONTINUED | OUTPATIENT
Start: 2024-07-25 | End: 2024-07-25 | Stop reason: HOSPADM

## 2024-07-25 RX ORDER — AMPICILLIN AND SULBACTAM 2; 1 G/1; G/1
3 INJECTION, POWDER, FOR SOLUTION INTRAMUSCULAR; INTRAVENOUS EVERY 6 HOURS
Status: COMPLETED | OUTPATIENT
Start: 2024-07-25 | End: 2024-07-26

## 2024-07-25 RX ORDER — OXYTOCIN/0.9 % SODIUM CHLORIDE 30/500 ML
340 PLASTIC BAG, INJECTION (ML) INTRAVENOUS CONTINUOUS PRN
Status: DISCONTINUED | OUTPATIENT
Start: 2024-07-25 | End: 2024-07-25

## 2024-07-25 RX ORDER — HYDROCORTISONE 25 MG/G
CREAM TOPICAL 3 TIMES DAILY PRN
Status: DISCONTINUED | OUTPATIENT
Start: 2024-07-25 | End: 2024-07-27 | Stop reason: HOSPADM

## 2024-07-25 RX ORDER — MORPHINE SULFATE 2 MG/ML
INJECTION, SOLUTION INTRAMUSCULAR; INTRAVENOUS PRN
Status: DISCONTINUED | OUTPATIENT
Start: 2024-07-25 | End: 2024-07-25

## 2024-07-25 RX ORDER — MISOPROSTOL 200 UG/1
400 TABLET ORAL
Status: DISCONTINUED | OUTPATIENT
Start: 2024-07-25 | End: 2024-07-27 | Stop reason: HOSPADM

## 2024-07-25 RX ORDER — BISACODYL 10 MG
10 SUPPOSITORY, RECTAL RECTAL DAILY PRN
Status: DISCONTINUED | OUTPATIENT
Start: 2024-07-27 | End: 2024-07-27 | Stop reason: HOSPADM

## 2024-07-25 RX ORDER — ONDANSETRON 4 MG/1
4 TABLET, ORALLY DISINTEGRATING ORAL EVERY 6 HOURS PRN
Status: DISCONTINUED | OUTPATIENT
Start: 2024-07-25 | End: 2024-07-27 | Stop reason: HOSPADM

## 2024-07-25 RX ORDER — METHYLERGONOVINE MALEATE 0.2 MG/ML
200 INJECTION INTRAVENOUS
Status: DISCONTINUED | OUTPATIENT
Start: 2024-07-25 | End: 2024-07-27 | Stop reason: HOSPADM

## 2024-07-25 RX ORDER — BUPIVACAINE HYDROCHLORIDE 2.5 MG/ML
INJECTION, SOLUTION EPIDURAL; INFILTRATION; INTRACAUDAL
Status: DISCONTINUED | OUTPATIENT
Start: 2024-07-25 | End: 2024-07-25

## 2024-07-25 RX ORDER — FENTANYL CITRATE 50 UG/ML
INJECTION, SOLUTION INTRAMUSCULAR; INTRAVENOUS PRN
Status: DISCONTINUED | OUTPATIENT
Start: 2024-07-25 | End: 2024-07-25

## 2024-07-25 RX ORDER — METOCLOPRAMIDE 10 MG/1
10 TABLET ORAL EVERY 6 HOURS PRN
Status: DISCONTINUED | OUTPATIENT
Start: 2024-07-25 | End: 2024-07-27 | Stop reason: HOSPADM

## 2024-07-25 RX ORDER — LIDOCAINE 40 MG/G
CREAM TOPICAL
Status: DISCONTINUED | OUTPATIENT
Start: 2024-07-25 | End: 2024-07-25

## 2024-07-25 RX ORDER — NALOXONE HYDROCHLORIDE 0.4 MG/ML
0.1 INJECTION, SOLUTION INTRAMUSCULAR; INTRAVENOUS; SUBCUTANEOUS
Status: DISCONTINUED | OUTPATIENT
Start: 2024-07-25 | End: 2024-07-25 | Stop reason: HOSPADM

## 2024-07-25 RX ORDER — ONDANSETRON 2 MG/ML
4 INJECTION INTRAMUSCULAR; INTRAVENOUS EVERY 30 MIN PRN
Status: DISCONTINUED | OUTPATIENT
Start: 2024-07-25 | End: 2024-07-25 | Stop reason: HOSPADM

## 2024-07-25 RX ORDER — METOCLOPRAMIDE HYDROCHLORIDE 5 MG/ML
10 INJECTION INTRAMUSCULAR; INTRAVENOUS EVERY 6 HOURS PRN
Status: DISCONTINUED | OUTPATIENT
Start: 2024-07-25 | End: 2024-07-27 | Stop reason: HOSPADM

## 2024-07-25 RX ORDER — SODIUM CHLORIDE 9 MG/ML
INJECTION, SOLUTION INTRAVENOUS CONTINUOUS
Status: DISCONTINUED | OUTPATIENT
Start: 2024-07-25 | End: 2024-07-25

## 2024-07-25 RX ORDER — DEXAMETHASONE SODIUM PHOSPHATE 4 MG/ML
4 INJECTION, SOLUTION INTRA-ARTICULAR; INTRALESIONAL; INTRAMUSCULAR; INTRAVENOUS; SOFT TISSUE
Status: DISCONTINUED | OUTPATIENT
Start: 2024-07-25 | End: 2024-07-25 | Stop reason: HOSPADM

## 2024-07-25 RX ORDER — MISOPROSTOL 200 UG/1
400 TABLET ORAL
Status: DISCONTINUED | OUTPATIENT
Start: 2024-07-25 | End: 2024-07-25

## 2024-07-25 RX ORDER — OXYMETAZOLINE HYDROCHLORIDE 0.05 G/100ML
2 SPRAY NASAL 2 TIMES DAILY
Status: COMPLETED | OUTPATIENT
Start: 2024-07-25 | End: 2024-07-27

## 2024-07-25 RX ORDER — CEFAZOLIN SODIUM/WATER 3 G/30 ML
3 SYRINGE (ML) INTRAVENOUS
Status: COMPLETED | OUTPATIENT
Start: 2024-07-25 | End: 2024-07-25

## 2024-07-25 RX ORDER — IBUPROFEN 800 MG/1
800 TABLET, FILM COATED ORAL EVERY 6 HOURS
Status: DISCONTINUED | OUTPATIENT
Start: 2024-07-26 | End: 2024-07-27 | Stop reason: HOSPADM

## 2024-07-25 RX ORDER — OXYTOCIN/0.9 % SODIUM CHLORIDE 30/500 ML
100-340 PLASTIC BAG, INJECTION (ML) INTRAVENOUS CONTINUOUS PRN
Status: DISCONTINUED | OUTPATIENT
Start: 2024-07-25 | End: 2024-07-27 | Stop reason: HOSPADM

## 2024-07-25 RX ADMIN — Medication 30 ML: at 08:50

## 2024-07-25 RX ADMIN — PHENYLEPHRINE HYDROCHLORIDE 200 MCG: 10 INJECTION INTRAVENOUS at 12:05

## 2024-07-25 RX ADMIN — ACETAMINOPHEN 975 MG: 325 TABLET, FILM COATED ORAL at 21:32

## 2024-07-25 RX ADMIN — KETOROLAC TROMETHAMINE 30 MG: 30 INJECTION, SOLUTION INTRAMUSCULAR at 19:38

## 2024-07-25 RX ADMIN — AMPICILLIN SODIUM AND SULBACTAM SODIUM 3 G: 2; 1 INJECTION, POWDER, FOR SOLUTION INTRAMUSCULAR; INTRAVENOUS at 21:32

## 2024-07-25 RX ADMIN — KETOROLAC TROMETHAMINE 30 MG: 30 INJECTION, SOLUTION INTRAMUSCULAR at 13:39

## 2024-07-25 RX ADMIN — Medication: at 06:48

## 2024-07-25 RX ADMIN — MORPHINE SULFATE 2 MG: 2 INJECTION, SOLUTION INTRAMUSCULAR; INTRAVENOUS at 12:50

## 2024-07-25 RX ADMIN — PHENYLEPHRINE HYDROCHLORIDE 50 MCG/MIN: 10 INJECTION INTRAVENOUS at 12:05

## 2024-07-25 RX ADMIN — ACETAMINOPHEN 975 MG: 325 TABLET, FILM COATED ORAL at 15:31

## 2024-07-25 RX ADMIN — SODIUM CHLORIDE, POTASSIUM CHLORIDE, SODIUM LACTATE AND CALCIUM CHLORIDE 500 ML: 600; 310; 30; 20 INJECTION, SOLUTION INTRAVENOUS at 03:12

## 2024-07-25 RX ADMIN — ONDANSETRON 4 MG: 2 INJECTION INTRAMUSCULAR; INTRAVENOUS at 11:37

## 2024-07-25 RX ADMIN — GUAIFENESIN 600 MG: 600 TABLET ORAL at 19:39

## 2024-07-25 RX ADMIN — SENNOSIDES AND DOCUSATE SODIUM 2 TABLET: 50; 8.6 TABLET ORAL at 19:39

## 2024-07-25 RX ADMIN — LIDOCAINE HYDROCHLORIDE,EPINEPHRINE BITARTRATE 2 ML: 20; .005 INJECTION, SOLUTION EPIDURAL; INFILTRATION; INTRACAUDAL; PERINEURAL at 12:50

## 2024-07-25 RX ADMIN — SODIUM CHLORIDE, POTASSIUM CHLORIDE, SODIUM LACTATE AND CALCIUM CHLORIDE: 600; 310; 30; 20 INJECTION, SOLUTION INTRAVENOUS at 06:50

## 2024-07-25 RX ADMIN — BUPIVACAINE HYDROCHLORIDE 20 ML: 2.5 INJECTION, SOLUTION EPIDURAL; INFILTRATION; INTRACAUDAL at 12:57

## 2024-07-25 RX ADMIN — SODIUM CHLORIDE, POTASSIUM CHLORIDE, SODIUM LACTATE AND CALCIUM CHLORIDE: 600; 310; 30; 20 INJECTION, SOLUTION INTRAVENOUS at 11:14

## 2024-07-25 RX ADMIN — LIDOCAINE HYDROCHLORIDE,EPINEPHRINE BITARTRATE 5 ML: 20; .005 INJECTION, SOLUTION EPIDURAL; INFILTRATION; INTRACAUDAL; PERINEURAL at 11:11

## 2024-07-25 RX ADMIN — OXYMETAZOLINE HYDROCHLORIDE 2 SPRAY: 0.05 SPRAY NASAL at 07:59

## 2024-07-25 RX ADMIN — LIDOCAINE HYDROCHLORIDE,EPINEPHRINE BITARTRATE 5 ML: 20; .005 INJECTION, SOLUTION EPIDURAL; INFILTRATION; INTRACAUDAL; PERINEURAL at 11:15

## 2024-07-25 RX ADMIN — FENTANYL CITRATE 75 MCG: 50 INJECTION INTRAMUSCULAR; INTRAVENOUS at 11:20

## 2024-07-25 RX ADMIN — OXYMETAZOLINE HYDROCHLORIDE 2 SPRAY: 0.05 SPRAY NASAL at 19:38

## 2024-07-25 RX ADMIN — Medication 3 G: at 11:32

## 2024-07-25 RX ADMIN — EPHEDRINE SULFATE 5 MG: 5 INJECTION INTRAVENOUS at 00:48

## 2024-07-25 RX ADMIN — NIRMATRELVIR AND RITONAVIR 3 TABLET: KIT at 19:38

## 2024-07-25 RX ADMIN — BUPIVACAINE 20 ML: 13.3 INJECTION, SUSPENSION, LIPOSOMAL INFILTRATION at 12:57

## 2024-07-25 RX ADMIN — GUAIFENESIN 600 MG: 600 TABLET ORAL at 07:59

## 2024-07-25 RX ADMIN — NIRMATRELVIR AND RITONAVIR 3 TABLET: KIT at 07:59

## 2024-07-25 RX ADMIN — LIDOCAINE HYDROCHLORIDE,EPINEPHRINE BITARTRATE 3 ML: 20; .005 INJECTION, SOLUTION EPIDURAL; INFILTRATION; INTRACAUDAL; PERINEURAL at 11:28

## 2024-07-25 RX ADMIN — SODIUM CHLORIDE, POTASSIUM CHLORIDE, SODIUM LACTATE AND CALCIUM CHLORIDE 250 ML: 600; 310; 30; 20 INJECTION, SOLUTION INTRAVENOUS at 08:20

## 2024-07-25 RX ADMIN — SODIUM CITRATE AND CITRIC ACID MONOHYDRATE 30 ML: 500; 334 SOLUTION ORAL at 08:50

## 2024-07-25 RX ADMIN — Medication 600 ML/HR: at 11:49

## 2024-07-25 RX ADMIN — LIDOCAINE HYDROCHLORIDE,EPINEPHRINE BITARTRATE 5 ML: 20; .005 INJECTION, SOLUTION EPIDURAL; INFILTRATION; INTRACAUDAL; PERINEURAL at 11:22

## 2024-07-25 RX ADMIN — ACETAMINOPHEN 975 MG: 325 TABLET, FILM COATED ORAL at 08:50

## 2024-07-25 ASSESSMENT — ACTIVITIES OF DAILY LIVING (ADL)
ADLS_ACUITY_SCORE: 18
ADLS_ACUITY_SCORE: 23
ADLS_ACUITY_SCORE: 18
ADLS_ACUITY_SCORE: 23
ADLS_ACUITY_SCORE: 18
ADLS_ACUITY_SCORE: 23
ADLS_ACUITY_SCORE: 18
ADLS_ACUITY_SCORE: 23
ADLS_ACUITY_SCORE: 23
ADLS_ACUITY_SCORE: 18
ADLS_ACUITY_SCORE: 18
ADLS_ACUITY_SCORE: 23
ADLS_ACUITY_SCORE: 23
ADLS_ACUITY_SCORE: 18
ADLS_ACUITY_SCORE: 23
ADLS_ACUITY_SCORE: 18

## 2024-07-25 NOTE — PROGRESS NOTES
SUBJECTIVE:  ==============  Lenka Adams is having effective pain management with epidural and has been able to sleep between cares. Lenka is very fatigued. She is still feeling congested and has on and off cough.   Support: Mom and partner sleeping at bedside    OBJECTIVE:  ==============  VITALS   Patient Vitals for the past 24 hrs:   BP Temp Temp src Resp SpO2   07/25/24 0715 -- 98.5  F (36.9  C) Oral -- --   07/25/24 0707 128/61 -- -- -- --   07/25/24 0648 -- -- -- -- 98 %   07/25/24 0643 -- -- -- -- 99 %   07/25/24 0638 -- -- -- -- 97 %   07/25/24 0633 -- -- -- -- 99 %   07/25/24 0628 -- -- -- -- 97 %   07/25/24 0623 -- -- -- -- 98 %   07/25/24 0618 -- -- -- -- 99 %   07/25/24 0613 -- -- -- -- 98 %   07/25/24 0608 -- -- -- -- 98 %   07/25/24 0607 (!) 155/72 -- -- -- --   07/25/24 0603 -- -- -- -- 97 %   07/25/24 0600 -- 98.3  F (36.8  C) Oral -- --   07/25/24 0515 -- 97.8  F (36.6  C) Oral -- --   07/25/24 0356 -- 97.8  F (36.6  C) Oral -- --   07/25/24 0350 -- -- -- -- 92 %   07/25/24 0339 104/56 -- -- -- --   07/25/24 0338 -- -- -- -- 95 %   07/25/24 0333 -- -- -- -- 94 %   07/25/24 0328 -- -- -- -- 97 %   07/25/24 0323 -- -- -- -- 97 %   07/25/24 0318 -- -- -- -- 97 %   07/25/24 0313 -- -- -- -- 96 %   07/25/24 0310 116/61 98.1  F (36.7  C) Oral -- 97 %   07/25/24 0303 -- -- -- -- 95 %   07/25/24 0300 101/54 -- -- -- 96 %   07/25/24 0257 109/55 -- -- -- --   07/25/24 0253 -- -- -- -- 96 %   07/25/24 0250 112/52 -- -- -- 96 %   07/25/24 0245 103/55 -- -- -- --   07/25/24 0241 103/55 -- -- -- --   07/25/24 0236 105/51 -- -- -- --   07/25/24 0232 105/57 -- -- -- --   07/25/24 0227 111/52 -- -- -- --   07/25/24 0223 -- -- -- -- 99 %   07/25/24 0221 (!) 88/44 -- -- -- --   07/25/24 0218 -- -- -- -- 100 %   07/25/24 0217 101/53 -- -- -- --   07/25/24 0213 -- -- -- -- 99 %   07/25/24 0210 99/52 -- -- -- --   07/25/24 0208 -- -- -- -- 100 %   07/25/24 0207 98/53 -- -- -- --   07/25/24 0203 -- -- --  -- 99 %   07/25/24 0202 105/50 -- -- -- --   07/25/24 0156 125/67 -- -- -- --   07/25/24 0145 117/61 -- -- -- --   07/25/24 0140 118/60 -- -- -- --   07/25/24 0137 112/58 -- -- -- --   07/25/24 0133 -- -- -- -- 90 %   07/25/24 0130 115/55 -- -- -- 93 %   07/25/24 0128 -- -- -- -- 97 %   07/25/24 0126 117/56 -- -- -- --   07/25/24 0123 -- 98.6  F (37  C) Oral -- 96 %   07/25/24 0122 118/58 -- -- -- --   07/25/24 0118 -- -- -- -- 94 %   07/25/24 0117 -- -- -- -- 94 %   07/25/24 0115 136/79 -- -- -- --   07/25/24 0113 -- -- -- -- 96 %   07/25/24 0110 129/76 -- -- -- --   07/25/24 0108 -- -- -- -- 95 %   07/25/24 0107 129/77 -- -- -- 93 %   07/25/24 0103 -- -- -- -- 96 %   07/25/24 0100 130/69 -- -- -- --   07/25/24 0058 -- -- -- -- 96 %   07/25/24 0056 131/71 -- -- -- --   07/25/24 0054 -- -- -- -- 94 %   07/25/24 0053 -- -- -- -- 95 %   07/25/24 0050 96/57 -- -- -- --   07/25/24 0048 -- -- -- -- 95 %   07/25/24 0047 -- -- -- -- (!) 73 %   07/25/24 0045 90/52 -- -- -- --   07/25/24 0042 -- -- -- -- 92 %   07/25/24 0040 94/50 -- -- -- --   07/25/24 0037 -- -- -- -- 91 %   07/25/24 0035 92/50 -- -- -- --   07/25/24 0032 -- -- -- -- 92 %   07/25/24 0030 95/51 98.5  F (36.9  C) Oral -- --   07/25/24 0027 -- -- -- -- 92 %   07/25/24 0026 95/54 -- -- -- 93 %   07/25/24 0020 106/56 -- -- -- --   07/25/24 0018 106/56 -- -- -- --   07/25/24 0017 -- -- -- -- 95 %   07/25/24 0016 102/57 -- -- -- --   07/25/24 0014 106/57 -- -- -- --   07/25/24 0012 105/59 -- -- -- 96 %   07/25/24 0010 105/57 -- -- -- --   07/25/24 0008 106/57 -- -- -- --   07/25/24 0007 -- -- -- -- 95 %   07/25/24 0006 107/55 -- -- -- --   07/25/24 0004 108/57 -- -- -- --   07/25/24 0002 107/58 -- -- -- 97 %   07/25/24 0000 97/55 -- -- -- --   07/24/24 2358 95/54 -- -- -- --   07/24/24 2357 -- -- -- -- 97 %   07/24/24 2356 93/55 -- -- -- --   07/24/24 2354 94/53 -- -- -- --   07/24/24 2352 93/50 -- -- -- 97 %   07/24/24 2350 93/54 -- -- -- --   07/24/24 2348  93/54 -- -- -- --   07/24/24 2347 102/58 -- -- -- 97 %   07/24/24 2345 -- -- -- -- (!) 85 %   07/24/24 2344 125/56 -- -- -- --   07/24/24 2340 (!) 83/45 -- -- -- 98 %   07/24/24 2339 (!) 81/44 -- -- -- --   07/24/24 2335 -- -- -- -- 99 %   07/24/24 2333 (!) 80/41 -- -- -- --   07/24/24 2330 -- 98.7  F (37.1  C) Oral -- 98 %   07/24/24 2329 91/51 -- -- -- --   07/24/24 2325 94/52 -- -- -- 99 %   07/24/24 2320 -- -- -- -- 98 %   07/24/24 2318 107/54 -- -- -- --   07/24/24 2315 -- -- -- -- 98 %   07/24/24 2312 102/55 -- -- -- --   07/24/24 2310 -- -- -- -- 98 %   07/24/24 2307 97/51 -- -- -- --   07/24/24 2305 -- -- -- -- 98 %   07/24/24 2302 97/49 -- -- -- --   07/24/24 2300 -- -- -- -- 98 %   07/24/24 2255 -- -- -- -- 98 %   07/24/24 2250 -- -- -- -- 98 %   07/24/24 2245 -- -- -- -- 99 %   07/24/24 2240 -- -- -- -- 99 %   07/24/24 2239 -- 98.5  F (36.9  C) Oral -- --   07/24/24 2235 -- -- -- -- 96 %   07/24/24 2230 106/53 -- -- -- 97 %   07/24/24 2225 101/49 -- -- -- 96 %   07/24/24 2220 107/53 -- -- -- 96 %   07/24/24 2215 104/51 -- -- -- 95 %   07/24/24 2210 106/51 -- -- -- 95 %   07/24/24 2207 106/49 -- -- -- --   07/24/24 2205 -- -- -- -- 97 %   07/24/24 2202 108/49 -- -- -- --   07/24/24 2200 -- -- -- -- 96 %   07/24/24 2158 -- -- -- -- 93 %   07/24/24 2157 101/49 -- -- -- --   07/24/24 2155 -- -- -- -- 97 %   07/24/24 2150 -- -- -- -- 97 %   07/24/24 2149 101/55 -- -- -- --   07/24/24 2146 111/55 -- -- -- --   07/24/24 2145 -- -- -- -- 98 %   07/24/24 2141 (!) 85/46 -- -- -- --   07/24/24 2140 -- -- -- -- 98 %   07/24/24 2138 94/50 -- -- -- --   07/24/24 2135 -- -- -- -- 97 %   07/24/24 2133 98/52 -- -- -- --   07/24/24 2130 -- 98.7  F (37.1  C) -- -- 98 %   07/24/24 2125 -- -- -- -- 97 %   07/24/24 2121 (!) 140/79 -- -- -- --   07/24/24 2120 -- -- -- -- 96 %   07/24/24 2116 (!) 143/82 -- -- -- --   07/24/24 2115 -- -- -- -- 99 %   07/24/24 2111 104/53 -- -- -- --   07/24/24 2110 -- -- -- -- 97 %   07/24/24  2108 133/58 -- -- -- --   07/24/24 2106 (!) 146/77 -- -- -- --   07/24/24 2105 (!) 141/67 -- -- -- --   07/24/24 2100 127/61 -- -- -- --   07/24/24 2058 -- 99  F (37.2  C) Oral 18 --   07/24/24 2057 129/73 -- -- -- --   07/24/24 2053 138/72 -- -- -- --   07/24/24 2046 (!) 143/69 -- -- -- --   07/24/24 2041 (!) 141/69 -- -- -- --   07/24/24 2036 (!) 146/76 -- -- -- --   07/24/24 2033 134/70 -- -- -- --   07/24/24 2026 117/55 -- -- -- --   07/24/24 2021 114/59 -- -- -- --   07/24/24 2019 114/64 -- -- -- --   07/24/24 2016 110/61 -- -- -- --   07/24/24 2014 109/54 -- -- -- --   07/24/24 2013 113/56 -- -- -- --   07/24/24 2011 111/64 -- -- -- --   07/24/24 2010 103/59 -- -- -- --   07/24/24 2007 (!) 85/51 -- -- -- --   07/24/24 2004 104/57 -- -- -- --   07/24/24 1710 112/57 98.4  F (36.9  C) Oral -- --   07/24/24 1600 116/66 98.2  F (36.8  C) Oral -- --   07/24/24 1440 115/73 -- -- -- --   07/24/24 1437 -- 98.5  F (36.9  C) Oral -- --   07/24/24 1305 105/52 98.8  F (37.1  C) Oral 18 --   07/24/24 1100 110/68 98  F (36.7  C) Oral -- --       FETAL HEART RATE ASSESSMENT:  Baseline rate 145, normal  Variability moderate  Accelerations present   Decelerations present, deceleration type: late , deceleration frequency: intermittent. Are the decelerations significant?   Yes    present for <50% of the time    CONTRACTIONS: Contractions every 3-4 minutes.  Palpate: strong  Pitocin- 8 mu/min.,  Antibiotics- none    ROM: clear fluid  PELVIC EXAM:deferred  Epidural for pain management     Assessment: EFM interpretation suggests absence of concern for fetal metabolic acidemia at this time due to accelerations present, decelerations present late , deceleration frequency: intermittent. Are the decelerations significant?   Yes  , heart rate: normal baseline, and variability: moderate     The interventions currently taken to improve the fetal heart rate tracing and fetal oxygenation are: maternal positioning, discontinue oxytocin , and  evaluate labor progress    Labor course:  24 SVE 80/-2, mao 10  2030 Araiza bulb placed; 2343 balloon out   24:  0040 pit started  0910- /-2, mid/med, mao 8  1032- pitocin decreased by 1/2 to 5mu (d/t min variability)  1040- AROM clear  1043- FSE placed, /-2, OP  1051- pitocin off  1215- cervix unchanged, IUPC placed, FSE replaced & at 1235  1239- 100mcg fentanyl #1  1313- pitocin restarted at 2 mu  1425- fentanyl #2  1622- pitocin 10mu, mvus 140-185  1658- IV infiltrated, 1705- pitocin restarted at 5mu, 1700- IV fentanyl #3  1804- 4-80/-2, pit at 7, mucinex ordered;   2118- 4.5, 100%, -2  0151- 4.5cm, 100%, -2 unchanged  0535- 4.5cm, 100%, -1 fse replaced  0759- Pitocin stopped     ASSESSMENT:  ==============  Lenka Mcclain Angie  28 year old  female  Estimated Date of Delivery: 2024  IUP @ 40w2d early labor    Fetal Heart Rate Tracing category two over the last 120 minutes with moderate variability, accelerations present, late decels <50% of the time, resolved with discontinuation of pitocin  GBS- negative  COVID PCR test positive  Respiratory panel negative     Patient Active Problem List   Diagnosis    Prepregnancy BMI 43- early 1hr Growth 28, 34, weekly BPP 34 weeks IOL 39-39+6-  @ 1930    HRP (high risk pregnancy), unspecified trimester    Need for immunization against rubella    Maternal varicella, non-immune    Vitamin D deficiency    Obesity complicating pregnancy    Encounter for induction of labor    Elevated blood pressure reading without diagnosis of hypertension    Infection due to 2019 novel coronavirus          PLAN:  ===========  - Algorithm consulted, meets criteria for failure of induction with no cervical change for over six hours, MVUs <200, 22 hours since AROM, 16 hrs 51 min pitocin with break  9432-7600 for minimal variability. Patient has had ongoing position changes and labor support.   - Discussed concerns regarding labor progression  with patient. Pt is agreeable to a  birth at this time.   - OB team updated, report given     I, BLANCA Harris am serving as a scribe; to document services personally performed by Luisa Cohen-ERIC Bauer, SELAM based on data collection and the provider's statements to me.   BLANCA Harris  I agree with the PFSH and ROS as completed by the student, except for changes made by me. The remainder of the encounter scribed by the student. The scribed note accurately reflects my personal services and decisions made by me.  Luisa Bauer, MAXIMINO, SELAM, APRN

## 2024-07-25 NOTE — PROVIDER NOTIFICATION
"   07/24/24 2057   Provider Notification   Provider Name/Title Karyn Nicole CNM   Method of Notification Electronic Page   Request Evaluate in Person   Notification Reason Decels     \"Pt still having decels after epidural bolus and position changes. Still uncomfortable. Do you want to come check her? Thanks!\"     Response: Pitocin halved and Pt checked. Unchanged from previous check- 4.5/100/-2.   "

## 2024-07-25 NOTE — PLAN OF CARE
Mild range blood pressures found overnight, patient was found hypotensive at points after epidural was placed, see MAR for details. All other vital signs WNL. Patient denies pre-e symptoms, vaginal bleeding. Pitocin currently infusing. Cat II tracing noted, see flowsheets for details. Provider notified. Interventions for cat II tracing include position changes, boluses, and apple juice. See previous notes for details. Patient comfortable with epidural. Paxlovid given for Covid symptoms. Support persons at bedside. Call light in reach. No questions or concerns at this time, will notify provider if change of status occurs.

## 2024-07-25 NOTE — PROGRESS NOTES
SUBJECTIVE:  ============  General appearance: comfortable wit epidural, sleeping between cares    Support: Mom and partner sleeping at bedside   Labor course:  24 /-2, mao 10  2030 Araiza bulb placed; 2343 balloon out   24:  0040 pit started  0910- 70/-2, mid/med, mao 8  1032- pitocin decreased by 1/2 to 5mu (d/t min variability)  1040- AROM clear  1043- FSE placed, 70/-2, OP  1051- pitocin off  1215- cervix unchanged, IUPC placed, FSE replaced & at 1235  1239- 100mcg fentanyl #1  1313- pitocin restarted at 2 mu  1425- fentanyl #2  1622- pitocin 10mu, mvus 140-185  1658- IV infiltrated, 1705- pitocin restarted at 5mu, 1700- IV fentanyl #3  1804- 4-/80/-2, pit at 7, mucinex ordered;   2118- 4.5, 100%, -2  0151- 4.5cm, 100%, -2 unchanged    OBJECTIVE:  ===========  Blood pressure 103/55, temperature 98.6  F (37  C), temperature source Oral, resp. rate 18, last menstrual period 10/17/2023, SpO2 99%.    CONTINUOUS FETAL MONITORING:  Baseline rate 145, normal, Variability with periods of moderate and now minimal for 30 mins, Accelerations present 30 mins ago, Decelerations recurrent early and occasional late   The interventions currently taken to improve the fetal heart rate tracing and fetal oxygenation are: maternal positioning and consult Category 2 algorithm    CONTRACTIONS:   Contractions every 2.5-5.5 minutes.    MVUs: 175, peaking at 195 briefly  Pitocin: 6 mu/min.      ROM: clear fluid since AROM yeasterday at 1020    PELVIC EXAM: deferred    Coping/Pain: coping well   Antibiotics- none      ASSESSMENT:  ==============  Lenka Mcclain Angie  28 year old  female  Estimated Date of Delivery: 2024  IUP @ 40w2d IOL for BMI>40   Fetal Heart rate tracing intermittently category one and category two   Assessment: EFM interpretation suggests concern for fetal metabolic acidemia at this time due to accelerations present and variability: moderate  GBS- negative  Elevated  blood pressures without diagnosis of hypertensive disorder   Covid positive     Patient Active Problem List   Diagnosis    Prepregnancy BMI 43- early 1hr Growth 28, 34, weekly BPP 34 weeks IOL 39-39+6- 7/23 @ 1930    HRP (high risk pregnancy), unspecified trimester    Need for immunization against rubella    Maternal varicella, non-immune    Vitamin D deficiency    Obesity complicating pregnancy    Encounter for induction of labor        PLAN:  ===========  Frequent position changes to facilitate labor with epidural anesthesia.  Continue labor induction with Kush EVANGELISTA on call Dr. Trent consulted and agrees with plan.   Per the category II algorithm, the plan is to continue observe/conservative management. Reevaluate in 60 minutes.     ERIC Mitchell CNM

## 2024-07-25 NOTE — PROVIDER NOTIFICATION
07/25/24 0055   Provider Notification   Provider Name/Title Karyn Nicole CNM   Method of Notification Electronic Page   Request Evaluate in Person   Notification Reason Status Update     Provider asked for strip review d/t concern from writer d/t minimal variability and decels continuing after interventions Per provider, will review ASAP after patient care. Writer to continue with position changes and other interventions as needed.

## 2024-07-25 NOTE — ANESTHESIA PROCEDURE NOTES
TAP Procedure Note    Pre-Procedure   Staff -        Anesthesiologist:  Diane Jarquin MD       Resident/Fellow: Jayna Rivas MD       Performed By: resident and with residents       Procedure performed by resident/fellow/CRNA in presence of a teaching physician.         Location: pre-op       Procedure Start/Stop Times: 7/25/2024 12:57 PM and 7/25/2024 1:00 PM       Pre-Anesthestic Checklist: patient identified, IV checked, site marked, risks and benefits discussed, informed consent, monitors and equipment checked, pre-op evaluation, at physician/surgeon's request and post-op pain management  Timeout:       Correct Patient: Yes        Correct Procedure: Yes        Correct Site: Yes        Correct Position: Yes        Correct Laterality: Yes        Site Marked: Yes  Procedure Documentation  Procedure: TAP       Laterality: bilateral       Patient Position: supine       Patient Prep/Sterile Barriers: sterile gloves, mask       Skin prep: Chloraprep       Needle Type: short bevel       Needle Gauge: 21.        Needle Length (millimeters): 110        Ultrasound guided       1. Ultrasound was used to identify targeted nerve, plexus, vascular marker, or fascial plane and place a needle adjacent to it in real-time.       2. Ultrasound was used to visualize the spread of anesthetic in close proximity to the above referenced structure.       3. A permanent image is entered into the patient's record.       4. The visualized anatomic structures appeared normal.       5. There were no apparent abnormal pathologic findings.    Assessment/Narrative         The placement was negative for: blood aspirated, painful injection and site bleeding       Paresthesias: No.       Bolus given via needle..        Secured via.        Insertion/Infusion Method: Single Shot       Complications: none    Medication(s) Administered   Bupivacaine 0.25% PF (Infiltration) - Infiltration   20 mL - 7/25/2024 12:57:00 PM  Bupivacaine liposome  "(Exparel) 1.3% LA inj susp (Infiltration) - Infiltration   20 mL - 7/25/2024 12:57:00 PM  Medication Administration Time: 7/25/2024 12:57 PM      FOR [facility] ([facility] [place]) ONLY:   Pain Team Contact information: please page the Pain Team Via [employer]. Search \"Pain\". During daytime hours, please page the attending first. At night please page the resident first.      "

## 2024-07-25 NOTE — PLAN OF CARE
Patient arrived to [facility] unit via zoom cart at 1440 ,with belongings, accompanied by family, with infant in arms. Received report from  Johana Rossi RN  and checked bands. Unit and room orientation completd. Call light given and within arms reach; no concerns present at this time. Continue with plan of care.

## 2024-07-25 NOTE — PLAN OF CARE
Luisa Bauer CNM at 0800. Patient has been ruptured for almost 24 hours and over 24 hours of pitocin augmentation. Patient is unchanged from previous checks and now baby has a cat 2 fetal heart tracing. Medical team recommending  section at this time. Patient in agreement with plan. Will start prepping patient for surgery now.

## 2024-07-25 NOTE — PROVIDER NOTIFICATION
07/24/24 8932   Provider Notification   Provider Name/Title Karyn Nicole CNM   Method of Notification In Department   Request Evaluate - Remote   Notification Reason Status Update     Strip reviewed with provider. Plan to start increasing pit if moderate variability with no decels continues for 20 minutes from start.

## 2024-07-25 NOTE — ANESTHESIA PROCEDURE NOTES
"Epidural catheter Procedure Note    Pre-Procedure   Staff -        Anesthesiologist:  Elvira Nova MD       Resident/Fellow: Cheri Colvin MD       Performed By: resident       Location: OB       Pre-Anesthestic Checklist: patient identified, IV checked, risks and benefits discussed, informed consent, monitors and equipment checked, pre-op evaluation, at physician/surgeon's request and post-op pain management  Timeout:       Correct Patient: Yes        Correct Procedure: Yes        Correct Site: Yes        Correct Position: Yes   Procedure Documentation  Procedure: epidural catheter       Patient Position: sitting       Patient Prep/Sterile Barriers: sterile gloves, mask, patient draped       Skin prep: Chloraprep       Local skin infiltrated with 3 mL of 1% lidocaine.        Insertion Site: L3-4. (midline approach).       Technique: LORT saline and LORT air        AMINTA at 9 cm.       Needle Type: ToCTS Mediay needle       Needle Gauge: 17.        Needle Length (Inches): 3.5        Catheter: 19 G.          Catheter threaded easily.           Threaded 14 cm at skin.         # of attempts: 1 and  # of redirects:  1    Assessment/Narrative         Paresthesias: No.       Test dose of 3 mL lidocaine 1.5% w/ 1:200,000 epinephrine at 20:03 CDT.         Test dose negative, 3 minutes after injection, for signs of intravascular, subdural, or intrathecal injection.       Insertion/Infusion Method: LORT saline and LORT air       Aspiration negative for Heme or CSF via Epidural Catheter.    Medication(s) Administered   0.125% bupivacaine (Epidural) - EPIDURAL   6 mL - 7/24/2024 8:10:00 PM    FOR [facility] ([facility] [place]) ONLY:   Pain Team Contact information: please page the Pain Team Via [employer]. Search \"Pain\". During daytime hours, please page the attending first. At night please page the resident first.      "

## 2024-07-25 NOTE — PROVIDER NOTIFICATION
07/25/24 0147   Provider Notification   Provider Name/Title Karyn Nicole CNM   Method of Notification In Department   Request Evaluate in Person   Notification Reason Other (Comment)     Strip review with provider d/t writer's concern with FHR tracing d/t minimal variability and decels unchanged with interventions. See flowsheets for details. Plan per provider is SVE and consult with OB team if needed.    Response: SVE unchanged. Plan per provider is to consult with OB for recommendation of c/s.

## 2024-07-25 NOTE — PROGRESS NOTES
SUBJECTIVE:  ============  General appearance: comfortable wit epidural, sleeping between cares    Support: Mom and partner sleeping at bedside   Labor course:  24 /-2, mao 10  2030 Araiza bulb placed; 2343 balloon out   24:  0040 pit started  0910- 70/-2, mid/med, mao 8  1032- pitocin decreased by 1/2 to 5mu (d/t min variability)  1040- AROM clear  1043- FSE placed, 70/-2, OP  1051- pitocin off  1215- cervix unchanged, IUPC placed, FSE replaced & at 1235  1239- 100mcg fentanyl #1  1313- pitocin restarted at 2 mu  1425- fentanyl #2  1622- pitocin 10mu, mvus 140-185  1658- IV infiltrated, 1705- pitocin restarted at 5mu, 1700- IV fentanyl #3  1804- 4-80/-2, pit at 7, mucinex ordered;   2118- 4.5, 100%, -2  0151- 4.5cm, 100%, -2 unchanged    OBJECTIVE:  ===========  Blood pressure 104/56, temperature 98.3  F (36.8  C), temperature source Oral, resp. rate 18, last menstrual period 10/17/2023, SpO2 92%.    CONTINUOUS FETAL MONITORING:  Baseline rate 140, normal, Variability moderate, Accelerations present, Decelerations none    CONTRACTIONS:   Contractions every 2-4 minutes.    MVUs: ranging 165-205 in the past 2 hours  Pitocin: 6 mu/min.      ROM: clear fluid since AROM yeasterday at 1020    PELVIC EXAM: deferred    Coping/Pain: coping well   Antibiotics- none      ASSESSMENT:  ==============  Lenka Mcclain Angie  28 year old  female  Estimated Date of Delivery: 2024  IUP @ 40w2d IOL for BMI>40   Fetal Heart rate tracing category one for 60 mins  Assessment: EFM interpretation suggests concern for fetal metabolic acidemia at this time due to accelerations present and variability: moderate  GBS- negative  Elevated blood pressures without diagnosis of hypertensive disorder   Covid positive     Patient Active Problem List   Diagnosis    Prepregnancy BMI 43- early 1hr Growth 28, 34, weekly BPP 34 weeks IOL 39-39+6- 7/23 @ 1930    HRP (high risk pregnancy), unspecified  trimester    Need for immunization against rubella    Maternal varicella, non-immune    Vitamin D deficiency    Obesity complicating pregnancy    Encounter for induction of labor    Elevated blood pressure reading without diagnosis of hypertension    Infection due to 2019 novel coronavirus        PLAN:  ===========  Reviewed that after 24 hours of ROM and Pitocin if no cervical change,  would be recommend.   Frequent position changes to facilitate labor with epidural anesthesia.   Continue labor induction with Pitocin      ERIC Mitchell CNM

## 2024-07-25 NOTE — PROGRESS NOTES
SUBJECTIVE:  ============  General appearance: comfortable with epidural    Support: mom in room  Labor course:  24 /-2, mao 10  2030 Araiza bulb placed; 2343 balloon out   24:  0040 pit started  0910- /-2, mid/med, mao 8  1032- pitocin decreased by 1/2 to 5mu (d/t min variability)  1040- AROM clear  1043- FSE placed, /-2, OP  1051- pitocin off  1215- cervix unchanged, IUPC placed, FSE replaced & at 1235  1239- 100mcg fentanyl #1  1313- pitocin restarted at 2 mu  1425- fentanyl #2  1622- pitocin 10mu, mvus 140-185  1658- IV infiltrated, 1705- pitocin restarted at 5mu, 1700- IV fentanyl #3  1804- 4-80/-2, pit at 7, mucinex ordered;    4.5, 100%, -2      OBJECTIVE:  ===========  Blood pressure 94/52, temperature 98.5  F (36.9  C), temperature source Oral, resp. rate 18, last menstrual period 10/17/2023, SpO2 99%.    CONTINUOUS FETAL MONITORING:  Baseline rate 145, normal, predominantly minimal variability since . Accelerations not present, Early decelerations   The interventions currently taken to improve the fetal heart rate tracing and fetal oxygenation are: maternal positioning, increase frequency of assessment, and consult Category 2 algorithm    Just started fluid bolus    CONTRACTIONS:   Contractions every 3-5 minutes.    MVUs: 160  Pitocin: 6 mu/min.      ROM: clear fluid since 1020 yesterday    PELVIC EXAM: deferred - last at  4.5cm, 100%, -2    Coping/Pain: coping well but feeling frustrated with lack of labor progress  Antibiotics- none      ASSESSMENT:  ==============  Lenka Mcclain Angie  28 year old  female  Estimated Date of Delivery: 2024  IUP @ 40w2d IOL for BMI 43   Fetal Heart rate tracing category two over the last 60 minutes  Assessment: EFM interpretation suggests concern for fetal metabolic acidemia at this time due to variability: minimal  GBS- negative    Patient Active Problem List   Diagnosis    Prepregnancy BMI 43- early 1hr  Growth 28, 34, weekly BPP 34 weeks IOL 39-39+6- 7/23 @ 1930    HRP (high risk pregnancy), unspecified trimester    Need for immunization against rubella    Maternal varicella, non-immune    Vitamin D deficiency    Obesity complicating pregnancy    Encounter for induction of labor        PLAN:  ===========  Continue with frequent position changes to facilitate fetal rotation and labor progress  Per the category II algorithm, the plan is to continue observe/conservative management. Reevaluate in 30 minutes.     ERIC Mitchell CNM    ADDENDUM at 0210:  S: Pt sleeping between cares. Comfortable with epidural. Pt's mom and partner at bedside.     O:  CONTINUOUS FETAL MONITORING:  Baseline rate 145, normal, minimal variability Accelerations not present, Early decelerations with occasional late deceleration   The interventions currently taken to improve the fetal heart rate tracing and fetal oxygenation are: maternal positioning, increase frequency of assessment, SVE and consult Category 2 algorithm    CONTRACTIONS:   Contractions every 3-5 minutes.    MVUs: 175  Pitocin: 6 mu/min.      ROM: clear fluid since 1020 yesterday    PELVIC EXAM: at 0151 4.5cm, 100%, -2 unchanged    A: IOL for BMI, cat 2 since   Assessment: EFM interpretation suggests concern for fetal metabolic acidemia at this time due to minimal variability    P:   Discussed FHT with patient and that  is indicated. Reviewed rationale. Pt teary, willing to discuss c/s with MD team.   Consulted with Dr. Trent and reviewed labor status and fetal heart rate tracing which is now returning to moderate variability. Dr. Trent recommended some juice and waiting 30 mins to reassess if cat 1.   Continue to consult cat 2 algorithm.     ERIC Mitchell CNM

## 2024-07-25 NOTE — PROVIDER NOTIFICATION
07/24/24 1716   Provider Notification   Provider Name/Title Lynsey VELEZ ELIZABETHM   Method of Notification Electronic Page   Request Evaluate - Remote   Notification Reason Fetal Baseline Change     CNM notified of IV infiltrating, started at half rate. FHT baseline 165, giving 250 mL bolus per CNM.

## 2024-07-25 NOTE — PROVIDER NOTIFICATION
07/24/24 2423   Provider Notification   Provider Name/Title Anesthesia   Method of Notification In Department   Request Evaluate in Person   Notification Reason Maternal Vital Sign Change     Anesthesia notified of hypotension. Per anesthesia, continue to give phenylephrine as needed. Order placed. Order placed for ephendrine. Provider to come give IM as needed. Pt aware.

## 2024-07-25 NOTE — PROVIDER NOTIFICATION
07/25/24 0204   Provider Notification   Provider Name/Title Karyn Nicole CNM   Method of Notification In Department   Request Evaluate in Person   Notification Reason Other (Comment)     Cat II huddle completed with CNM and OB team. Concerns voiced by writer. Minimal variability and decels as charted. Strip reviewed with CNM. Plan per providers is to continue with positions changes and other interventions as needed as well as giving apple juice to try and improve variability. Pt aware and agreeable.

## 2024-07-25 NOTE — PLAN OF CARE
Goal Outcome Evaluation:      Plan of Care Reviewed With: patient, significant other    Overall Patient Progress: improvingOverall Patient Progress: improving    Outcome Evaluation: Tmax 100.4, MD at bedside to assess. Otherwise VSS at this time. Incisional pain controlled with Tylenol and Toradol. Pt attempting breastfeeding, but infant sleepy at the breast. Able to hand express several large drops to finger feed to infant. Pt tolerating regular diet. Pt has yet to stand at side of bed, but plan to ambulate once hourly VS completed. Good UOP via burr catheter to dependent drainage. Postpartum checks WNL x small amount of drainage noted to lower part of dressing. Dressing marked. Pt and significant other. Tunde, bonding well with infant.      Problem: Postpartum ( Delivery)  Goal: Optimal Pain Control and Function  Outcome: Progressing  Intervention: Prevent or Manage Pain  Recent Flowsheet Documentation  Taken 2024 1531 by Sangeetha Lassiter RN  Pain Management Interventions: medication (see MAR)  Taken 2024 1447 by Sangeetha Lassiter RN  Perineal Care: absorbent brief/pad changed     Problem: Postpartum ( Delivery)  Goal: Effective Urinary Elimination  Outcome: Progressing     Problem: Postpartum ( Delivery)  Goal: Successful Parent Role Transition  Outcome: Progressing  Intervention: Support Parent Role Transition  Recent Flowsheet Documentation  Taken 2024 1447 by Sangeetha Lassiter RN  Supportive Measures:   active listening utilized   positive reinforcement provided   self-care encouraged   verbalization of feelings encouraged  Parent-Child Attachment Promotion:   caring behavior modeled   cue recognition promoted   face-to-face positioning promoted   interaction encouraged   parent/caregiver presence encouraged   participation in care promoted   rooming-in promoted   positive reinforcement provided   skin-to-skin contact encouraged   strengths emphasized

## 2024-07-25 NOTE — PROVIDER NOTIFICATION
07/25/24 0005   Provider Notification   Provider Name/Title Karyn Nicole CNM   Method of Notification In Department   Request Evaluate - Remote   Notification Reason Status Update     Provider aware of hypotension. See MAR for interventions. Strip reviewed. SVE deferred per provider at this time. Plan per provider is to continue with position changes.

## 2024-07-25 NOTE — PROGRESS NOTES
FHT reviewed at the request of the midwife with concern for persistent category 2 tracing in the setting of absent labor progress due to inadequate contractions.    FHT reviewed, baseline 145, minimal variability on and off since 0000. Had a period of late decels around 0100 which resolved. Now, has moderate variability and early decels, and has one non-recurrent late decel.    While I remain concerned regarding her labor progress in the setting of intermittent category 2 tracing, at this time, I believe that her FHT has improved and would not recommend  for impending fetal acidemia.       Her labor has not progressed since AROM at 1030 this AM. She is not yet in active labor. I recommended working to improve the strength of her contractions as they are inadequate as assessed by IUPC.     Katey Trent MD, MSCI, FACOG    [facility]  2024

## 2024-07-25 NOTE — PROGRESS NOTES
SUBJECTIVE:  ============  General appearance: uncomfortable with contractions, feeling central pelvic pain. Was comfortable right after epidural but then started having some break through pain. MDA increased basal rate and patient has been bolusing so now is having some improvement.     Support: Mom and partner  Labor course:  7/23/24 2030 SVE 1/80/-2, mao 10  2030 Araiza bulb placed; 2343 balloon out   7/24/24:  0040 pit started  0910- 4/70/-2, mid/med, mao 8  1032- pitocin decreased by 1/2 to 5mu (d/t min variability)  1040- AROM clear  1043- FSE placed, 4/70/-2, OP  1051- pitocin off  1215- cervix unchanged, IUPC placed, FSE replaced & at 1235  1239- 100mcg fentanyl #1  1313- pitocin restarted at 2 mu  1425- fentanyl #2  1622- pitocin 10mu, mvus 140-185  1658- IV infiltrated, 1705- pitocin restarted at 5mu, 1700- IV fentanyl #3  1804- 4-5/80/-2, pit at 7, mucinex ordered  2005- epidural    OBJECTIVE:  ===========  Blood pressure 104/53, temperature 99  F (37.2  C), temperature source Oral, resp. rate 18, last menstrual period 10/17/2023.    CONTINUOUS FETAL MONITORING:  Baseline rate 155, normal, Variability primarily minimal with brief periods of meoderate, Accelerations not present, Decelerations now recurrent since 2000. Initially difficult to determine if early or late decels since IUPC fell out while sitting for epidural, mixed with occasional variable decel.    The interventions currently taken to improve the fetal heart rate tracing and fetal oxygenation are: maternal positioning, obtain IV access, increase frequency of assessment, evaluate labor progress, sterile vaginal exam, and emotional support    CONTRACTIONS:   Contractions every 2-6 minutes. Strength of 60-80mmHg, MVUs   Palpate: strong  Pitocin: previously at 8 mu/min, decreased to 4mu/min.     ROM: clear fluid since AROM at     PELVIC EXAM: at 2140 4-5cm, 100%, -2, OP/asynclitic. Fetal head 1cm beyond cervix, no pressure applied       Coping/Pain: coping well by breathing through contractions when able to feel them  Antibiotics- none      ASSESSMENT:  ==============  Lenka Adams  28 year old  female  Estimated Date of Delivery: 2024  IUP @ 40w1d IOL for BMI of 43   Fetal Heart rate tracing category two over the last 90 minutes  Assessment: EFM interpretation suggests concern for fetal metabolic acidemia at this time due to decelerations late and variability: minimal  GBS- negative    Patient Active Problem List   Diagnosis    Prepregnancy BMI 43- early 1hr Growth 28, 34, weekly BPP 34 weeks IOL 39-39+6- 7 @ 1930    HRP (high risk pregnancy), unspecified trimester    Need for immunization against rubella    Maternal varicella, non-immune    Vitamin D deficiency    Obesity complicating pregnancy    Encounter for induction of labor        PLAN:  ===========  Discussed lack of labor progress, but not an indication for . Reviewed FHT and that if it continues to be cat 2 cesareawn would be recommended. Pt verbalizes understanding.   Frequent position changes to facilitate labor with epidural anesthesia.   Continue labor induction with Pitocin. Titrating up if return to category 1   Reviewed FHT with Dr. Trent and RN. In agreement that previously decels were earlies, now some lates.     Per the category II algorithm, the plan is to continue observe/conservative management. Reevaluate in 30 minutes.     ERIC Mitchell CNM     ADDENDUM at 2300:   Returned to cat 1 FHT with accels. Will continue to monitor closely and restart cat 2 algorithm prn.   Titrate up Pitocin to adequate MVUs.   Continue position changes   Reevaluate in 1-2 hours    ERIC Mitchell CNM

## 2024-07-25 NOTE — PLAN OF CARE
RN providing 1:1 care throughout the day. Pt mother and SO at bedside. VSS, afebrile. See flowsheets for FHT documentation and previous notes regarding interventions for category 2 strip. Upon finishing shift, FHT category 1 with early decelerations. RN assisting with position changes throughout the day, encouraging upright and hands and knees due to back pain. Pt using birthing ball this AM, Standing/swaying in room. AROM at 1040 and FSE/IUPC. Pt reports of rectal pressure with contractions at 1600. See flowsheets for full labor interventions and notes from CNM. Pitocin gtt currently at 9 mL/hr, pain increased, pt requests epidural. Anesthesia and CNM notified. Report given to AMPARO Melvin who assumed care.

## 2024-07-25 NOTE — PLAN OF CARE
Data: Lenka Adams transferred to 71 via cart at 1430. Baby transferred via parent's arms.  Action: Receiving unit notified of transfer: Yes. Patient and family notified of room change. Report given to Elana at 1445. Belongings sent to receiving unit. Accompanied by Registered Nurse. Oriented patient to surroundings. Call light within reach. ID bands double-checked with receiving RN.  Response: Patient tolerated transfer and is stable.

## 2024-07-25 NOTE — PROGRESS NOTES
I have been apprised of the course and plan.   Consented by Dr. Altamirano for primary  section  Anesthesia aware  Awaiting room turnover as EFM currently reassuring.     Addis Bowser MD

## 2024-07-25 NOTE — DISCHARGE SUMMARY
[facility] Discharge Summary    Lenka Adams MRN# [id number]   Age: 28 year old YOB: 1995     Date of Admission:  2024  Date of Discharge::  2024  Admitting Physician:  ERIC Herrera CN  Discharge Physician:  Yesenia Helton MD             Admission Diagnoses:   - Intrauterine pregnancy at 40w2d  - Rubella non-immune  - Varicella non-immune  - Vitamin D deficient           Discharge Diagnosis:     - Same, delivered   - Postpartum endometritis  - Covid   - Failed IOL   - Acute blood loss anemia           Procedures:     Procedure(s): - Primary low transverse  section with single layer closure via Pfannenstiel skin incision  - Spinal                 Medications Prior to Admission:     Medications Prior to Admission   Medication Sig Dispense Refill Last Dose    cetirizine (ZYRTEC) 10 MG tablet Take 10 mg by mouth daily   Past Week    famotidine (PEPCID) 20 MG tablet Take 1 tablet (20 mg) by mouth 2 times daily 90 tablet 0 2024    Prenatal Vit-Fe Fumarate-FA (PRENATAL MULTIVITAMIN W/IRON) 27-0.8 MG tablet Take 1 tablet by mouth daily 90 tablet 3 2024    SENNA-docusate sodium (SENNA S) 8.6-50 MG tablet Take 1 tablet by mouth at bedtime 60 tablet 2 Past Month    [DISCONTINUED] acetaminophen (TYLENOL) 500 MG tablet Take 500-1,000 mg by mouth every 6 hours as needed for mild pain   2024    [DISCONTINUED] aspirin 81 MG EC tablet Take 1 tablet (81 mg) by mouth daily 90 tablet 3 2024    [DISCONTINUED] cholecalciferol (VITAMIN D3) 125 mcg (5000 units) capsule Take 1 capsule (125 mcg) by mouth daily Take one capsule daily. 90 capsule 3 Unknown             Discharge Medications:        Review of your medicines        START taking        Dose / Directions   ibuprofen 800 MG tablet  Commonly known as: ADVIL/MOTRIN  Used for: S/P  section      Dose: 800 mg  Take 1 tablet (800 mg) by mouth every 6 hours  Quantity: 60 tablet  Refills: 0      nirmatrelvir and ritonavir 300 mg/100 mg therapy pack  Commonly known as: PAXLOVID  Used for: S/P  section      Dose: 3 tablet  Take 3 tablets by mouth 2 times daily for 3 doses . Finish remainder of Paxlovid therapy pack that was started in the hospital.  Follow instructions on that pack.  Refills: 0     norethindrone 0.35 MG tablet  Commonly known as: MICRONOR  Used for: Encounter for initial prescription of contraceptive pills      Dose: 0.35 mg  Take 1 tablet (0.35 mg) by mouth daily  Quantity: 30 tablet  Refills: 2     oxyCODONE 5 MG tablet  Commonly known as: ROXICODONE  Used for: S/P  section      Dose: 5 mg  Take 1 tablet (5 mg) by mouth every 4 hours as needed for moderate to severe pain  Quantity: 9 tablet  Refills: 0            CONTINUE these medicines which may have CHANGED, or have new prescriptions. If we are uncertain of the size of tablets/capsules you have at home, strength may be listed as something that might have changed.        Dose / Directions   acetaminophen 325 MG tablet  Commonly known as: TYLENOL  This may have changed:   medication strength  how much to take  when to take this  reasons to take this  Used for: S/P  section      Dose: 975 mg  Take 3 tablets (975 mg) by mouth every 6 hours  Quantity: 60 tablet  Refills: 0            CONTINUE these medicines which have NOT CHANGED        Dose / Directions   cetirizine 10 MG tablet  Commonly known as: zyrTEC      Dose: 10 mg  Take 10 mg by mouth daily  Refills: 0     famotidine 20 MG tablet  Commonly known as: PEPCID  Used for: HRP (high risk pregnancy), unspecified trimester      Dose: 20 mg  Take 1 tablet (20 mg) by mouth 2 times daily  Quantity: 90 tablet  Refills: 0     prenatal multivitamin w/iron 27-0.8 MG tablet  Used for: Pregnancy test positive      Dose: 1 tablet  Take 1 tablet by mouth daily  Quantity: 90 tablet  Refills: 3     SENNA-docusate sodium 8.6-50 MG tablet  Commonly known as: SENNA S  Used for:  HRP (high risk pregnancy), unspecified trimester      Dose: 1 tablet  Take 1 tablet by mouth at bedtime  Quantity: 60 tablet  Refills: 2            STOP taking      aspirin 81 MG EC tablet        cholecalciferol 125 mcg (5000 units) capsule  Commonly known as: VITAMIN D3                  Where to get your medicines        These medications were sent to [place], [place] - [address]  [address], [facility] [address]      Phone: [phone]   acetaminophen 325 MG tablet  ibuprofen 800 MG tablet  norethindrone 0.35 MG tablet  oxyCODONE 5 MG tablet                Consultations:   - OB MD team   - Anesthesia   - Lactation           Brief Admission History:   Ms. Lenka Adams is a 28 year old now  who initially presented at 40w0d to the CNM team for IOL in the setting of obesity with BMI 49. Patient underwent IOL but was ruptured on Pitocin for >24 hours with little cervical change at 4 cm and then developed a category II FHT.  section was recommended. Patient was consented.        Intraoperative course   The procedure was uncomplicated.   mL.      Findings:   1. Liveborn male infant in ROP position, Apgars 8/9   2. Clear amniotic fluid  3. Normal appearing uterus/tubes/ovaries     See operative report for details.        Postpartum Course   The patient's hospital course was unremarkable.  She developed a fever to 100.4 and was treated for endometritis with 24h Unasyn. Was noted to have minimal cough and congestion found to have COVID and completed 4/5 Paxlovid while inpatient. Otherwise recovered as anticipated and experienced no post-operative complications. On discharge, her pain was well controlled. Vaginal bleeding is similar to peak menstrual flow.  Voiding without difficulty.  Ambulating well and tolerating a normal diet.  No fever or significant wound drainage.  Breastfeeding well.  Infant is stable.  No bowel movement yet.  She was  discharged on post-partum day #2.    Post-partum hemoglobin:   Hemoglobin   Date Value Ref Range Status   07/26/2024 9.6 (L) 11.7 - 15.7 g/dL Final             Discharge Instructions and Follow-Up:     Discharge diet: Regular   Discharge activity: No lifting greater than 20 lbs, pushing, pulling, or other strenuous activity for 6 weeks. Pelvic rest for 6 weeks including no sexual intercourse, tampons, or douching. No driving until you can slam on the breaks without pain or while on narcotic pain medications.    Discharge follow-up: Follow up with primary OB for routine postpartum visit with MMR and Varicella vaccinations in 6 weeks and incision check in 2 weeks   Wound care: Keep incision clean and dry           Discharge Disposition:     Discharged to home      Asha Allen MD  Obstetrics, Gynecology & Women's Health  Resident, PGY-1  07/27/2024 11:32 AM     Appreciate note by Dr. Allen. Patient has been seen and examined by me separate from the resident, agree with above note.     Yesenia Helton MD  7:13 AM

## 2024-07-25 NOTE — PROVIDER NOTIFICATION
07/24/24 2035   Provider Notification   Provider Name/Title Karyn Nicole CNM   Method of Notification In Department   Request Evaluate - Remote   Notification Reason Status Update     Strip reviewed with SELAM. Provider aware of IUPC displacement. TOCO reapplied. Plans to replace IUPC after other pt care. Provider aware of epidural. Pt repositioned as needd based on FHR.

## 2024-07-25 NOTE — PROGRESS NOTES
OB/GYN Consult Note    Asked by Luisa Bauer CNM to evaluate for  section.     In brief, patient is a 29 yo  at 40w2d who was originally admitted through the [facility] service for IOL in the setting of obesity with a current pregnancy BMI of 49. She was also recently diagnosed with COVID and is undergoing treatment with Paxlovid.     Labor course: She underwent cervical ripening with a Araiza balloon. She then was stated on pitocin at 0040 on . SVE at 0910 was 4/70/-2. She underwent AROM at 1040 and was continued on pitocin. She then made minimal cervical change from 4/70/-2 to 4-5/100/-2 per CNM team until this morning. Recently, FHT became category 2, and in the setting of minimal cervical change while ruptured on pitocin, is now amenable to  section. Pitocin was turned off at 0800 this morning.      She states that she is currently tired and also dealing with nasal congestion from her COVID infection. No other questions or concerns     PMH: BMI 49  PSH: denies  Allergies: clindamycin    O:  /55   Pulse 92   Temp 98.5  F (36.9  C) (Oral)   Resp 18   LMP 10/17/2023   SpO2 100%     Gen: Laying in bed, NAD   CV: Well perfused   Pulm: Breathing comfortably on RA   Abd: Gravid, nontender.     SVE: 4-5/70/-2 with caput by my exam, FSE replaced    FHT:   Baseline 150, moderate variability, accels present, small variable decels but largely cat 1   [place]: quiet     A/P:  at 40w2d admitted for IOL with [facility] service for elevated BMI. Labor course and exam as above. Has been ruptured on pitocin for nearly 24 hours with minimal cervical change. As of this morning, has now developed a category 2 tracing while on pitocin and is remote from delivery. Reasonable to proceed with PLTCS.     Reviewed risks of C/s including risk of bleeding including risk of  hysterectomy, risk of infection, and risk of injury to surrounding structures. Patient expressed understanding and agreed to proceed.  Consents to blood transfusion if indicated.     - To OR for unscheduled C/s   - Ancef 3g, Azithromycin 500 mg   - PPH: contraindication to methergine in the setting of Paxlovid use, no history of asthma  - FHT currently largely category 1 off pitocin     Discussed with Dr. Bowser.     Yesenia Altamirano DO, PGY-2  [facility]   July 25, 2024 9:22 AM

## 2024-07-25 NOTE — BRIEF OP NOTE
[facility]    Brief Operative Note    Pre-operative diagnosis:  delivery w/o mention of indication, mora min [O82]  Post-operative diagnosis Same as pre-operative diagnosis    Procedure:  section, N/A - Abdomen    Surgeon: Surgeons and Role:     * Addis Bowser MD - Primary     * Yesenia Altamirano DO - Resident - Assisting  Anesthesia: Spinal   Estimated Blood Loss: 800 mL     Specimens: * No specimens in log *  Findings:    Liveborn male infant in ROP position, Apgars 8/9  Clear amniotic fluid  Normal appearing uterus/tubes/ovaries   Complications: None.  Implants: * No implants in log *    Full operative note to follow.     Yesenia Altamirano DO, PGY-2  [facility]   2024 1:02 PM      Addis Bowser MD

## 2024-07-25 NOTE — ANESTHESIA CARE TRANSFER NOTE
Patient: Lenka Adams    Procedure: Procedure(s):   section       Diagnosis:  delivery w/o mention of indication, mora min [O82]  Diagnosis Additional Information: No value filed.    Anesthesia Type:   Epidural     Note:    Oropharynx: oropharynx clear of all foreign objects and spontaneously breathing  Level of Consciousness: awake  Oxygen Supplementation: room air    Independent Airway: airway patency satisfactory and stable  Dentition: dentition unchanged  Vital Signs Stable: post-procedure vital signs reviewed and stable  Report to RN Given: handoff report given  Patient transferred to: Labor and Delivery (Patient to recover in room given COVID+ status)    Handoff Report: Identifed the Patient, Identified the Reponsible Provider, Reviewed the pertinent medical history, Discussed the surgical course, Reviewed Intra-OP anesthesia mangement and issues during anesthesia, Set expectations for post-procedure period and Allowed opportunity for questions and acknowledgement of understanding      Vitals:  Vitals Value Taken Time   /60 24 1309   Temp     Pulse     Resp     SpO2 100 % 24 1308   Vitals shown include unfiled device data.    Electronically Signed By: Jayna Rivas MD  2024  1:13 PM

## 2024-07-25 NOTE — PLAN OF CARE
Data: Pt to OB recovery room 442 at 1310 via cart. PIV infusing without complications, burr with concentrated yellow urine to gravity, pt reports some mild pain, denies any nausea and vomiting.  Interventions: IV to pump, monitors and alarms on, SCD on.  Response: stable.  Plan: Patient instructed to notify RN for pain or nausea, routine post op cares, initiate breastfeeding/pumping as soon as patient/infant able.

## 2024-07-25 NOTE — ANESTHESIA PROCEDURE NOTES
"Epidural catheter Procedure Note    Pre-Procedure   Staff -        Anesthesiologist:  Elvira Nova MD       Resident/Fellow: Cheri Colvin MD       Performed By: resident       Location: OB       Procedure Start/Stop Times: 7/24/2024 8:28 PM       Pre-Anesthestic Checklist: patient identified, IV checked, risks and benefits discussed, informed consent, monitors and equipment checked, pre-op evaluation, at physician/surgeon's request and post-op pain management  Timeout:       Correct Patient: Yes        Correct Procedure: Yes        Correct Site: Yes        Correct Position: Yes   Procedure Documentation  Procedure: epidural catheter       Diagnosis: labor pain       Patient Position: sitting       Patient Prep/Sterile Barriers: sterile gloves, mask, patient draped       Skin prep: Chloraprep       Local skin infiltrated with mL of 1% lidocaine.        Insertion Site: L2-3. (midline approach).       Technique: LORT saline        AMINTA at 9 cm.       Needle Type: Touhy needle       Needle Gauge: 17.        Needle Length (Inches): 3.5        Catheter: 19 G.          Catheter threaded easily.             # of attempts: 2 and  # of redirects:  2    Assessment/Narrative         Paresthesias: No.       Test dose of 3 mL lidocaine 1.5% w/ 1:200,000 epinephrine at.         Test dose negative, 3 minutes after injection, for signs of intravascular, subdural, or intrathecal injection.       Insertion/Infusion Method: LORT saline       Aspiration negative for Heme or CSF via Epidural Catheter.     Comments:  1st attempt with patient poorly positioned and moving. Second attempt after repositioning the patient, at the same interspace, successful on single needle pass. Tolerated well the procedure.       FOR [facility] ([facility] [place]) ONLY:   Pain Team Contact information: please page the Pain Team Via iCross[employer]. Search \"Pain\". During daytime hours, please page the attending first. At night please page the resident first.      "

## 2024-07-25 NOTE — PROVIDER NOTIFICATION
07/24/24 2225   Provider Notification   Provider Name/Title Dr Trent   Method of Notification In Department   Request Evaluate - Remote   Notification Reason Status Update;Decels     Strip reviewed at 30 minute waqas of last review with Dr Trent. Based on FHR, provider would not recommend c/s at this time. Plan per provider is to continue with position changes as needed to improve FHR variability.

## 2024-07-25 NOTE — PROVIDER NOTIFICATION
07/24/24 1523   Provider Notification   Provider Name/Title Karyn Wilson CNM   Method of Notification In Department   Request Evaluate - Remote   Notification Reason Decels;Status Update     Strip reviewed with Dr Trent and Karyn Nicole CNM d/t persistent category II tracing. Minimal variability with variables and lates. Plan per providers is to continue with interventions including position changes and fluid bolus as needed for 30 more minutes and then reevaluate for recommendation of c/s.

## 2024-07-26 LAB — HGB BLD-MCNC: 9.6 G/DL (ref 11.7–15.7)

## 2024-07-26 PROCEDURE — 120N000002 HC R&B MED SURG/OB UMMC

## 2024-07-26 PROCEDURE — 250N000011 HC RX IP 250 OP 636

## 2024-07-26 PROCEDURE — 36415 COLL VENOUS BLD VENIPUNCTURE: CPT

## 2024-07-26 PROCEDURE — 250N000013 HC RX MED GY IP 250 OP 250 PS 637

## 2024-07-26 PROCEDURE — 85018 HEMOGLOBIN: CPT

## 2024-07-26 RX ADMIN — GUAIFENESIN 600 MG: 600 TABLET ORAL at 08:45

## 2024-07-26 RX ADMIN — SENNOSIDES AND DOCUSATE SODIUM 1 TABLET: 50; 8.6 TABLET ORAL at 08:45

## 2024-07-26 RX ADMIN — IBUPROFEN 800 MG: 800 TABLET, FILM COATED ORAL at 10:34

## 2024-07-26 RX ADMIN — ACETAMINOPHEN 975 MG: 325 TABLET, FILM COATED ORAL at 04:09

## 2024-07-26 RX ADMIN — OXYMETAZOLINE HYDROCHLORIDE 2 SPRAY: 0.05 SPRAY NASAL at 10:34

## 2024-07-26 RX ADMIN — GUAIFENESIN 600 MG: 600 TABLET ORAL at 21:00

## 2024-07-26 RX ADMIN — OXYCODONE HYDROCHLORIDE 5 MG: 5 TABLET ORAL at 18:38

## 2024-07-26 RX ADMIN — ACETAMINOPHEN 975 MG: 325 TABLET, FILM COATED ORAL at 10:34

## 2024-07-26 RX ADMIN — IBUPROFEN 800 MG: 800 TABLET, FILM COATED ORAL at 16:39

## 2024-07-26 RX ADMIN — AMPICILLIN SODIUM AND SULBACTAM SODIUM 3 G: 2; 1 INJECTION, POWDER, FOR SOLUTION INTRAMUSCULAR; INTRAVENOUS at 10:38

## 2024-07-26 RX ADMIN — ACETAMINOPHEN 975 MG: 325 TABLET, FILM COATED ORAL at 23:32

## 2024-07-26 RX ADMIN — OXYCODONE HYDROCHLORIDE 5 MG: 5 TABLET ORAL at 23:32

## 2024-07-26 RX ADMIN — NIRMATRELVIR AND RITONAVIR 3 TABLET: KIT at 21:00

## 2024-07-26 RX ADMIN — AMPICILLIN SODIUM AND SULBACTAM SODIUM 3 G: 2; 1 INJECTION, POWDER, FOR SOLUTION INTRAMUSCULAR; INTRAVENOUS at 04:08

## 2024-07-26 RX ADMIN — NIRMATRELVIR AND RITONAVIR 3 TABLET: KIT at 08:45

## 2024-07-26 RX ADMIN — ENOXAPARIN SODIUM 40 MG: 40 INJECTION SUBCUTANEOUS at 08:46

## 2024-07-26 RX ADMIN — ENOXAPARIN SODIUM 40 MG: 40 INJECTION SUBCUTANEOUS at 21:00

## 2024-07-26 RX ADMIN — AMPICILLIN SODIUM AND SULBACTAM SODIUM 3 G: 2; 1 INJECTION, POWDER, FOR SOLUTION INTRAMUSCULAR; INTRAVENOUS at 16:52

## 2024-07-26 RX ADMIN — IBUPROFEN 800 MG: 800 TABLET, FILM COATED ORAL at 23:32

## 2024-07-26 RX ADMIN — OXYMETAZOLINE HYDROCHLORIDE 2 SPRAY: 0.05 SPRAY NASAL at 21:00

## 2024-07-26 RX ADMIN — SENNOSIDES AND DOCUSATE SODIUM 2 TABLET: 50; 8.6 TABLET ORAL at 21:00

## 2024-07-26 RX ADMIN — KETOROLAC TROMETHAMINE 30 MG: 30 INJECTION, SOLUTION INTRAMUSCULAR at 02:04

## 2024-07-26 RX ADMIN — ACETAMINOPHEN 975 MG: 325 TABLET, FILM COATED ORAL at 16:39

## 2024-07-26 ASSESSMENT — ACTIVITIES OF DAILY LIVING (ADL)
ADLS_ACUITY_SCORE: 22
ADLS_ACUITY_SCORE: 21
ADLS_ACUITY_SCORE: 22
ADLS_ACUITY_SCORE: 21
ADLS_ACUITY_SCORE: 22
ADLS_ACUITY_SCORE: 21
ADLS_ACUITY_SCORE: 22
ADLS_ACUITY_SCORE: 21
ADLS_ACUITY_SCORE: 22
ADLS_ACUITY_SCORE: 21
ADLS_ACUITY_SCORE: 22
ADLS_ACUITY_SCORE: 21
ADLS_ACUITY_SCORE: 22

## 2024-07-26 NOTE — PLAN OF CARE
Goal Outcome Evaluation: Vital signs stable. Postpartum assessment WDL. Feeling tired, but minimal COVID symptoms. Incision WDL. Dried drainage outlined on dressing. Patient may want to shower later and remove dressing. Pain controlled with tylenol and ibuprofen. Patient ambulating independently, voiding without difficulty. Patient reports passing gas. Breastfeeding on cue with minimal assist. Patient and infant bonding well. Will continue with current plan of care.       Plan of Care Reviewed With: patient, spouse                 Problem: Adult Inpatient Plan of Care  Goal: Plan of Care Review  Description: The Plan of Care Review/Shift note should be completed every shift.  The Outcome Evaluation is a brief statement about your assessment that the patient is improving, declining, or no change.  This information will be displayed automatically on your shift  note.  Outcome: Progressing  Flowsheets (Taken 2024 1416)  Plan of Care Reviewed With:   patient   spouse     Problem: Adult Inpatient Plan of Care  Goal: Optimal Comfort and Wellbeing  Outcome: Progressing  Intervention: Provide Person-Centered Care  Recent Flowsheet Documentation  Taken 2024 0815 by Jenni Alfredo RN  Trust Relationship/Rapport:   care explained   choices provided   questions encouraged     Problem: Postpartum ( Delivery)  Goal: Successful Parent Role Transition  Outcome: Progressing  Intervention: Support Parent Role Transition  Recent Flowsheet Documentation  Taken 2024 0815 by Jenni Alfredo RN  Supportive Measures:   active listening utilized   goal-setting facilitated  Parent-Child Attachment Promotion: caring behavior modeled     Problem: Adult Inpatient Plan of Care  Goal: Readiness for Transition of Care  Outcome: Progressing     Problem: Postpartum ( Delivery)  Goal: Fluid and Electrolyte Balance  Outcome: Progressing

## 2024-07-26 NOTE — PLAN OF CARE
Goal Outcome Evaluation:      Plan of Care Reviewed With: patient    Overall Patient Progress: improvingOverall Patient Progress: improving     Data: VSS, postpartum assessments WNL. Pt has attempted to void, it was unsuccessful. She then  was straight cathed after being bladder scanned..She is eating and drinking without nausea. Incision appears to be healing well, dressing marked and slightly extended. Lochia WNL, no s/s infection. Breastfeeding infant with assistance.  Taking tylenol and ibuprofen. Ampicillin given. Pt reports good pain management.   Action: Education provided on discharge goals, plan of care, pain management, breastfeeding and urinary retention.  Response: Pt is agreeable with her plan of care. Pt is independent providing  cares and is attentive to infant needs. Support person, present. Anticipate discharge  tomorrow. Plan of care ongoing, no concerns as of present.

## 2024-07-26 NOTE — ANESTHESIA POSTPROCEDURE EVALUATION
Patient: Lenka Adams    Procedure: Procedure(s):   section       Anesthesia Type:  Epidural    Note:  Disposition: Inpatient   Postop Pain Control: Uneventful            Sign Out: Well controlled pain   PONV: No   Neuro/Psych: Uneventful            Sign Out: Acceptable/Baseline neuro status   Airway/Respiratory: Uneventful            Sign Out: Acceptable/Baseline resp. status   CV/Hemodynamics: Uneventful            Sign Out: Acceptable CV status; No obvious hypovolemia; No obvious fluid overload   Other NRE: NONE   DID A NON-ROUTINE EVENT OCCUR? YES    Event details/Postop Comments:  Delayed (cat 2 tracing remote from delivery, then tracing reverted to cat 1) due to clarification of policy for visitors with symptomatic respiratory illness. Patient found to be covid + when she became symptomatic on day 2 of her induction. Per policy, people with symptomatic respiratory illness aren't allowed as visitors in the hospital, but her  wasn't screened and he was symptomatic but with her already for 2 days. Discussed with nursing, infection control, and upper nursing management. As he had already been with her, he is allowed to stay with her if he stays in her room and wears a mask. On discussion with patient in the room, he blew nose. Given concern for active symptoms and full beard and need for blowing nose not allowing full mask coverage, mother of the patient (asymptommatic) was support patient in the OR. Patient and family understandably upset, but understanding.        Epidural-to- Updated ASA: 3 Emergent      Last vitals:  Vitals Value Taken Time   /58 24 1424   Temp 36.1  C (97  F) 24 1315   Pulse     Resp 18 24 1324   SpO2 100 % 24 1424       Electronically Signed By: Diane Jarquin MD  2024  11:43 AM

## 2024-07-26 NOTE — PROGRESS NOTES
OBGYN Attending Postpartum Progress Note  Note entry delayed due to urgent patient care. Patient seen at approximately 10AM    S: Pain is controlled. Lochia is normal. Ambulating without difficulty. Araiza out, voiding without difficulty. Breastfeeding is going well.  Minimal cough, only congestion.    O:  /61   Pulse 91   Temp 98  F (36.7  C) (Oral)   Resp 16   LMP 10/17/2023   SpO2 98%   Breastfeeding Unknown     Gen: NAD  Abdomen: soft, nontender, nondistended, fundus firm and below umbilicus  Incision: clean/dry/intact  Ext: 1+ edema bilaterally    A/P:  Lenka Adams is a  who is POD#1 after PCS for failed induction. Labor complicated by COVID diagnosis. She is doing well.    Pain:  well-managed with current regimen    Routine PP cares:   -Rh POS/RNI/varicella NI/TDAP UTD   -MMR and varicella vaccines ordered   -stool softeners and pericares as needed  -breastfeeding is going well  -contraception: not discussed    3. Acute blood loss anemia:  -Hgb 10.2 >  > 9.6. Tolerating without symptoms. Will  discharge home with iron as Hgb < 10.    4. COVID:  -day 3/5 of paxlovid. Good saturations and minimal symptoms. Continue supportive cares as needed.    5. Gestational hypertension:   -HELLP labs within normal. BP normotensive today without meds. Continue to monitor.     6. Postpartum endometritis:  -fever of 100.4F on 1752. Has remained afebrile and pain is improved. Appropriate to stop antibiotics at 24h from fever as long as she remains afebrile.     Anticipate discharge in 1-2 nights.     Katey Trent MD, MSCI    [facility]/OBGYN  24

## 2024-07-26 NOTE — OP NOTE
[facility]  Full Operative Progress Note     Name: Lenka Adams    MRN: [id number]    : 1995    Date of Surgery: 2024    Pre-operative Diagnosis:   -  at 40w2d  - BMI 49  - COVID+  - gHTN   - Failed IOL     Post-operative Diagnosis:   - Same, now   - Liveborn male infant    Procedure(s): Primary low transverse  section with single layer uterine closure via Pfannenstiel skin incision      Surgeon:  Addis Bowser MD     Assistants:  Yesenia Altamirano DO, PGY-2    Serafin Mejia MS3       Anesthesia: Epidural     QBL: 850 mL     Urine Output: 175 mL clear urine     Fluids: 900 mL crystalloid    Medications: Ancef 2g, Azithromycin 500mg, Pitocin 60U    Specimens: cord gases, cord blood, cord segment     Complications: None apparent.    Indications:   Lekna Adams is a 28 year old year old  at 40w2d who presented for IOL for elevated BMI with the CNM service. She underwent cervical ripening, then was augmented with pitocin and AROM. After 24 hours of pitocin with minimal cervical change beyond 4-5 cm and development of category 2 FHT,  section was recommended. The risks, benefits, and alternatives of  section were discussed with the patient, and she agreed to proceed.     Findings:   - A single vigorous, liveborn male weighing 3560g with apgars of 8 and 9.  - Arterial cord blood: 7.34 pH, -9 base excess. Venous cord blood: 7.34 pH, 0.8 base excess.  - Normal appearing uterus, fallopian tubes, ovaries.    - No nuchal cord. Clear amniotic fluid.   - No intraabdominal or recto-fascial adhesions.    Procedure Details:   After the brief, the patient was brought to the OR, where adequate epidural anesthesia was administered.  She was placed in the dorsal supine position with a slight leftward tilt. Araiza catheter was placed in the bladder. She was prepped and draped in the usual sterile fashion. A surgical time out was  performed. A Pfannenstiel skin incision was made with the scalpel, and carried down to the underlying fascia with sharp and blunt dissection. The fascia was incised in the midline, and the incision was extended bluntly. The rectus muscles were  in the midline, and the peritoneum was entered bluntly, and the opening was extended with digital pressure. The bladder blade was placed. A transverse hysterotomy was made with the scalpel in the lower uterine segment, and the incision was extended with digital pressure. The infant was noted to be in the ROP position, and was delivered atraumatically.  No nuchal cord was noted. The cord was doubly clamped and cut, and the infant was handed off to the awaiting nursery staff. A segment of cord was cut and sent to lab. The placenta was delivered with gentle traction on the umbilical cord and uterine massage. The uterus was exteriorized and cleared of all clots and debris. Uterine tone was noted to be adequate with 60 units of pitocin given through the running IV and uterine massage.  The hysterotomy was closed with a running locked suture of 0 Vicryl.  The hysterotomy was noted to be hemostatic. The posterior cul-de-sac was cleared of all clots and debris. The uterus was returned to the abdomen. The pericolic gutters were cleared of all clots and debris. The hysterotomy was reexamined and noted to be hemostatic. The fascia and rectus muscles were examined and areas of oozing were controlled with electrocautery. The fascia was closed with a running 0-looped PDS suture. The subcutaneous tissue was irrigated and areas of oozing were controlled with electrocautery. The subcutaneous tissue was greater than 2 cm in thickness, and was therefore was closed with 3-0 plain gut. The skin was closed with 4-0 Monocryl and covered with a sterile dressing. A debrief was performed.     All sponge, needle, and instrument counts were correct. The patient tolerated the procedure well, and  was transferred to recovery in stable condition. Dr. Bowser was present and scrubbed for the entirety of the procedure.     Yesenia Altamirano DO, PGY-2  [facility]   July 25, 2024 7:48 PM     I was present and scrubbed through fascial closure and immediately available thereafter.   Addis Bowser MD

## 2024-07-27 VITALS
DIASTOLIC BLOOD PRESSURE: 64 MMHG | WEIGHT: 293 LBS | HEART RATE: 80 BPM | OXYGEN SATURATION: 98 % | BODY MASS INDEX: 50.17 KG/M2 | SYSTOLIC BLOOD PRESSURE: 115 MMHG | TEMPERATURE: 97.5 F | RESPIRATION RATE: 18 BRPM

## 2024-07-27 PROCEDURE — 250N000011 HC RX IP 250 OP 636

## 2024-07-27 PROCEDURE — 250N000013 HC RX MED GY IP 250 OP 250 PS 637

## 2024-07-27 RX ORDER — OXYCODONE HYDROCHLORIDE 5 MG/1
5 TABLET ORAL EVERY 4 HOURS PRN
Qty: 9 TABLET | Refills: 0 | Status: SHIPPED | OUTPATIENT
Start: 2024-07-27 | End: 2024-08-08

## 2024-07-27 RX ORDER — ACETAMINOPHEN AND CODEINE PHOSPHATE 120; 12 MG/5ML; MG/5ML
0.35 SOLUTION ORAL DAILY
Qty: 30 TABLET | Refills: 2 | Status: SHIPPED | OUTPATIENT
Start: 2024-07-27 | End: 2024-08-08

## 2024-07-27 RX ORDER — ACETAMINOPHEN 325 MG/1
975 TABLET ORAL EVERY 6 HOURS
Qty: 60 TABLET | Refills: 0 | Status: SHIPPED | OUTPATIENT
Start: 2024-07-27

## 2024-07-27 RX ORDER — IBUPROFEN 800 MG/1
800 TABLET, FILM COATED ORAL EVERY 6 HOURS
Qty: 60 TABLET | Refills: 0 | Status: SHIPPED | OUTPATIENT
Start: 2024-07-27

## 2024-07-27 RX ADMIN — GUAIFENESIN 600 MG: 600 TABLET ORAL at 08:20

## 2024-07-27 RX ADMIN — IBUPROFEN 800 MG: 800 TABLET, FILM COATED ORAL at 05:20

## 2024-07-27 RX ADMIN — NIRMATRELVIR AND RITONAVIR 3 TABLET: KIT at 08:20

## 2024-07-27 RX ADMIN — ENOXAPARIN SODIUM 40 MG: 40 INJECTION SUBCUTANEOUS at 08:20

## 2024-07-27 RX ADMIN — SENNOSIDES AND DOCUSATE SODIUM 2 TABLET: 50; 8.6 TABLET ORAL at 08:20

## 2024-07-27 RX ADMIN — OXYCODONE HYDROCHLORIDE 5 MG: 5 TABLET ORAL at 05:20

## 2024-07-27 RX ADMIN — OXYMETAZOLINE HYDROCHLORIDE 2 SPRAY: 0.05 SPRAY NASAL at 08:21

## 2024-07-27 RX ADMIN — ACETAMINOPHEN 975 MG: 325 TABLET, FILM COATED ORAL at 05:20

## 2024-07-27 RX ADMIN — IBUPROFEN 800 MG: 800 TABLET, FILM COATED ORAL at 11:46

## 2024-07-27 RX ADMIN — ACETAMINOPHEN 975 MG: 325 TABLET, FILM COATED ORAL at 11:46

## 2024-07-27 RX ADMIN — OXYCODONE HYDROCHLORIDE 5 MG: 5 TABLET ORAL at 11:46

## 2024-07-27 ASSESSMENT — ACTIVITIES OF DAILY LIVING (ADL)
ADLS_ACUITY_SCORE: 20
ADLS_ACUITY_SCORE: 18
ADLS_ACUITY_SCORE: 20
ADLS_ACUITY_SCORE: 18
ADLS_ACUITY_SCORE: 18
ADLS_ACUITY_SCORE: 20

## 2024-07-27 NOTE — PLAN OF CARE
"Goal Outcome Evaluation:  Shift 0700-discharge  Afebrile. VSS, except 3-5/10. Fundus U1, scant, rubra lochia. LS clear on RA. Good PO intake, good appetite. Denies PreE Symptoms at this time. Incision site is open to air with steri strips in place and inter dry. Patient is using belly band and tolerating well. Voiding independently, passing gas.  Taking IBU, tylenol and oxy as needed. Bonding well with infant in room. Helping with infant cares. Patient is breast feeding every 2-3 hours. Patient discharged. All education reviewed, questions addressed, medication reviewed and verified. Discharge weight completed. EDS 3, BC completed, Videos reviewed. Has a Pump at home. Discharged at 1317.      Problem: Adult Inpatient Plan of Care  Goal: Plan of Care Review  Description: The Plan of Care Review/Shift note should be completed every shift.  The Outcome Evaluation is a brief statement about your assessment that the patient is improving, declining, or no change.  This information will be displayed automatically on your shift  note.  Outcome: Met  Goal: Patient-Specific Goal (Individualized)  Description: You can add care plan individualizations to a care plan. Examples of Individualization might be:  \"Parent requests to be called daily at 9am for status\", \"I have a hard time hearing out of my right ear\", or \"Do not touch me to wake me up as it startles  me\".  Outcome: Met  Goal: Absence of Hospital-Acquired Illness or Injury  Outcome: Met  Intervention: Prevent Skin Injury  Recent Flowsheet Documentation  Taken 7/27/2024 0814 by Darlyn Koenig, RN  Body Position: position changed independently  Intervention: Prevent Infection  Recent Flowsheet Documentation  Taken 7/27/2024 0814 by Darlyn Koenig, RN  Infection Prevention:   cohorting utilized   hand hygiene promoted   personal protective equipment utilized   rest/sleep promoted  Goal: Optimal Comfort and Wellbeing  Outcome: Met  Intervention: Provide Person-Centered " Care  Recent Flowsheet Documentation  Taken 2024 by Darlyn Koenig RN  Trust Relationship/Rapport:   care explained   choices provided   emotional support provided   empathic listening provided   questions answered   questions encouraged   reassurance provided  Goal: Readiness for Transition of Care  Outcome: Met     Problem: Postpartum ( Delivery)  Goal: Successful Parent Role Transition  Outcome: Met  Intervention: Support Parent Role Transition  Recent Flowsheet Documentation  Taken 2024 by Darlyn Koenig RN  Supportive Measures:   active listening utilized   positive reinforcement provided   self-care encouraged  Parent-Child Attachment Promotion:   caring behavior modeled   cue recognition promoted   face-to-face positioning promoted   interaction encouraged   parent/caregiver presence encouraged   participation in care promoted   positive reinforcement provided   rooming-in promoted   skin-to-skin contact encouraged  Goal: Hemostasis  Outcome: Met  Goal: Effective Bowel Elimination  Outcome: Met  Intervention: Enhance Bowel Motility and Elimination  Recent Flowsheet Documentation  Taken 2024 by Darlyn Koenig RN  Bowel Motility Enhancement: fluid intake encouraged  Bowel Elimination Promotion: adequate fluid intake promoted  Goal: Fluid and Electrolyte Balance  Outcome: Met  Goal: Absence of Infection Signs and Symptoms  Outcome: Met  Intervention: Prevent or Manage Infection  Recent Flowsheet Documentation  Taken 2024 by Darlyn Koenig RN  Infection Management: aseptic technique maintained  Isolation Precautions: special precautions maintained  Goal: Anesthesia/Sedation Recovery  Outcome: Met  Goal: Optimal Pain Control and Function  Outcome: Met  Goal: Nausea and Vomiting Relief  Outcome: Met  Goal: Effective Urinary Elimination  Outcome: Met  Goal: Effective Oxygenation and Ventilation  Outcome: Met  Intervention: Optimize Oxygenation and  Ventilation  Recent Flowsheet Documentation  Taken 7/27/2024 0814 by Darlyn Koenig, RN  Head of Bed (HOB) Positioning: HOB at 60-90 degrees

## 2024-07-27 NOTE — DISCHARGE INSTRUCTIONS
Warning Signs after Having a Baby    Keep this paper on your fridge or somewhere else where you can see it.    Call your provider if you have any of these symptoms up to 12 weeks after having your baby.    Thoughts of hurting yourself or your baby  Pain in your chest or trouble breathing  Severe headache not helped by pain medicine  Eyesight concerns (blurry vision, seeing spots or flashes of light, other changes to eyesight)  Fainting, shaking or other signs of a seizure    Call 9-1-1 if you feel that it is an emergency.     The symptoms below can happen to anyone after giving birth. They can be very serious. Call your provider if you have any of these warning signs.    My provider s phone number: _______________________    Losing too much blood (hemorrhage)    Call your provider if you soak through a pad in less than an hour or pass blood clots bigger than a golf ball. These may be signs that you are bleeding too much.    Blood clots in the legs or lungs    After you give birth, your body naturally clots its blood to help prevent blood loss. Sometimes this increased clotting can happen in other areas of the body, like the legs or lungs. This can block your blood flow and be very dangerous.     Call your provider if you:  Have a red, swollen spot on the back of your leg that is warm or painful when you touch it.   Are coughing up blood.     Infection    Call your provider if you have any of these symptoms:  Fever of 100.4 F (38 C) or higher.  Pain or redness around your stitches if you had an incision.   Any yellow, white, or green fluid coming from places where you had stitches or surgery.    Mood Problems (postpartum depression)    Many people feel sad or have mood changes after having a baby. But for some people, these mood swings are worse.     Call your provider right away if you feel so anxious or nervous that you can't care for yourself or your baby.    Preeclampsia (high blood pressure)    Even if you  didn't have high blood pressure when you were pregnant, you are at risk for the high blood pressure disease called preeclampsia. This risk can last up to 12 weeks after giving birth.     Call your provider if you have:   Pain on your right side under your rib cage  Sudden swelling in the hands and face    Remember: You know your body. If something doesn't feel right, get medical help.     For informational purposes only. Not to replace the advice of your health care provider. Copyright 2020 [facility]. All rights reserved. Clinically reviewed by Amrita Valenzuela, RNC-OB, MSN. [employer] [id number] - Rev .    Parto por cesárea: Qué esperar [facility]   Section: What to Expect at Home  Wade recuperación     Un parto por cesárea, o simplemente cesárea, es mark cirugía mediante la cual se da a jose ramon a un bebé a través de un uriel, llamado incisión, que hace el médico en la parte baja del abdomen y el útero.  Es posible que sienta dolor en la parte baja del abdomen y que necesite analgésicos (medicamentos para el dolor) yahaira 1 o 2 semanas. Puede esperar tener algo de sangrado vaginal yahaira varias semanas. Es probable que necesite unas 6 semanas para recuperarse completamente.  Es importante que se tome las cosas con calma mientras trevor la incisión. Evite levantar objetos pesados, hacer actividades vigorosas o hacer ejercicios que pudieran esforzar los músculos abdominales mientras se recupera. Pídale a un familiar o amigo que la ayude con las tareas domésticas, la comida y las compras.  Esta hoja de cuidados le da mark idea general del tiempo que le llevará recuperarse. Sin embargo, cada persona se recupera a un ritmo diferente. Siga los pasos que se mencionan a continuación para recuperarse lo más rápido posible.   Cómo puede cuidarse en el hogar?  Actividad    Descanse cuando se sienta cansada. Dormir lo suficiente la ayudará a recuperarse.     Intente caminar todos los días. Comience caminando un  poco más de lo que caminó el día anterior. Poco a poco, aumente la distancia. Caminar mejora el flujo de tayo y ayuda a prevenir la neumonía, el estreñimiento y los coágulos de tayo.     Evite las actividades intensas, charlie montar en bicicleta, trotar, levantar pesas y hacer ejercicios aeróbicos yahaira 6 semanas o hasta que stubbs médico lo apruebe.     Hasta que stubbs médico lo apruebe, no levante nada más pesado que stubbs bebé.     No nissa abdominales ni ningún otro ejercicio que utilice los músculos del abdomen yahaira 6 semanas o hasta que stubbs médico lo apruebe.     Sostenga mark almohada sobre la incisión al toser o respirar profundamente. Le proporcionará apoyo a stubbs abdomen y reducirá el dolor.     Puede ducharse charlie de costumbre. Seque la incisión con toques suaves de toalla cuando termine.     Tendrá algo de sangrado vaginal. Use toallas sanitarias. No se nissa lavados vaginales ni use tampones hasta que el médico se lo permita.     Pregúntele a stubbs médico cuándo puede volver a conducir.     Es probable que necesite ausentarse del trabajo por lo menos 6 semanas. [place] dependerá del tipo de trabajo que nissa y de cómo se sienta.     Pregúntele a stubbs médico cuándo puede tener relaciones sexuales.   Alimentación    Puede continuar con stubbs dieta normal. Si tiene malestar estomacal, coma alimentos suaves bajos en grasa, charlie arroz sin condimentar, allison a la zari, pan orse y yogur.     Leonora abundantes líquidos (a menos que stubbs médico le indique lo contrario).     Podría notar que no evacua el intestino con regularidad jefferson después de la cirugía. [place] es común. Trate de evitar el estreñimiento y no hacer esfuerzos cuando evacua el intestino. Eloy vez desee audie un suplemento de fibra todos los días. Si no ha evacuado el intestino después de un par de días, pregúntele a stubbs médico si puede audie un laxante suave.     Si está amamantando, limite el alcohol. El alcohol puede causar falta de energía y otros problemas de  hazel para el bebé cuando mark karen que amamanta cheko mucho. También puede ser un obstáculo en la capacidad de mark mamá para alimentar a stubbs bebé o para cuidarlo en otros aspectos. No hay mucha investigación sobre qué cantidad exacta de alcohol puede ser perjudicial para un bebé. No consumir alcohol es la opción más adrian para stubbs bebé. Si opta por beber mark bebida alcohólica de vez en cuando, solo tome mark bebida y limite las ocasiones en que milagros alcohol. Después de beber alcohol, espere al menos 2 horas antes de amamantar para reducir la cantidad de alcohol que el bebé pueda recibir a través de la leche.   Medicamentos    Stubbs médico le dirá si puede volver a audie christian medicamentos y cuándo puede volver a hacerlo. También le dará indicaciones sobre cualquier medicamento nuevo que deba audie usted.     Si dejó de audie aspirina o algún otro anticoagulante, stubbs médico le dirá cuándo puede comenzar a tomarlo nuevamente.     [place] los analgésicos (medicamentos para el dolor) exactamente charlie le fueron indicados.  Si el médico le recetó un analgésico, tómelo según las indicaciones.  Si no está tomando un analgésico recetado, pregúntele a stubbs médico si puede audie rigoberto de venta benjie.     Si le parece que el analgésico le está produciendo malestar estomacal:  [place] el medicamento después de las comidas (a menos que stubbs médico le haya indicado lo contrario).  Pídale al médico un analgésico diferente.     Si stubbs médico le recetó antibióticos, tómelos según las indicaciones. No deje de tomarlos por el hecho de sentirse mejor. Debe audie todos los antibióticos hasta terminarlos.   Cuidado de la incisión    Si tiene tiras de cinta adhesiva sobre la incisión, déjeselas puestas yahaira mark semana o hasta que se caigan por sí solas.     Lave la aashish a diario con agua jabonosa tibia y séquela con toques suaves de toalla. No use peróxido de hidrógeno (agua oxigenada) o alcohol porque pueden retrasar la sanación. Podría cubrir la aashish con  "mark venda de gasa si supura o roza contra la ropa. Cambie la venda todos los días.     Mantenga la aashish limpia y seca.   Otras instrucciones    Si amamanta a stubbs bebé, ann vez le resulte más cómodo, yahaira el proceso de sanación, sostener al bebé de alguna manera en la que no se apoye en stubbs abdomen. Intente poner a stubbs bebé debajo del brazo, con el cuerpo del lado que lo vaya a amamantar. Sostenga la parte superior del cuerpo de stubbs bebé con stubbs brazo. Con haider mano usted puede controlar la shawna de stubbs bebé para acercarle la boca a stubbs seno. [place] se conoce a veces charlie \"posición de balón de fútbol americano\".   La atención de seguimiento es mark parte clave de stubbs tratamiento y seguridad. Asegúrese de hacer y acudir a todas las citas, y llame a stubbs médico si está teniendo problemas. También es mark buena idea saber los resultados de christian exámenes y mantener mark lista de los medicamentos que perry.   Cuándo debe pedir ayuda?  Comparta esta información con stbubs patricia, stubbs jose guadalupe o mark amiga. Pueden ayudarla a prestar atención a las señales de advertencia.  Llame al 911  en cualquier momento que considere que necesita atención de urgencia. Por ejemplo, llame si:    Tiene pensamientos de herirse a sí misma, hacerle daño a stubbs bebé o a otra persona.     Se desmayó (perdió el conocimiento).     Tiene dolor en el pecho, le falta el aire o tose tayo.     Tiene convulsiones.   Dónde obtener ayuda las 24 horas del día, los 7 días de la semana   Si usted o alguien que conoce habla de suicidio, autolesionarse, mark crisis de hazel mental, mark crisis por consumo de sustancias o cualquier otro tipo de malestar psíquico, obtenga ayuda de inmediato. Usted puede:    Marcar [phone] para llamar a la línea de prevención del suicidio y crisis.     Llamar al [phone] ([phone]).     Enviar un mensaje de texto que diga HOME al [id number] para acceder a la línea de mensajes de texto en casos de crisis.   Considere guardar estos números en stubbs " teléfono.  Visite [email or web address] para obtener más información o conversar en línea.  Llame al médico ahora mismo o busque atención médica inmediata si:    Tiene puntos de sutura flojos o se le abre la incisión.     Tiene señales de hemorragia (sangrado excesivo), charlie:  Sangrado vaginal intenso. [place] significa que está empapando mark o más toallas sanitarias en mark hora. O expulsa coágulos de tayo más grandes que un huevo.  Se siente mareada o aturdida, o siente charlie si se fuera a desmayar.  Se siente tan cansada o débil que no puede hacer christian actividades habituales.  Latidos cardíacos acelerados o irregulares.  Dolor abdominal nuevo o más intenso.     Tiene síntomas de infección, tales charlie:  Aumento del dolor, la hinchazón, la temperatura o el enrojecimiento.  Vetas rojizas que salen de la incisión.  Pus que sale de la incisión.  Fiebre.  Micción frecuente o dolorosa o tayo en la orina.  Secreción vaginal con olor desagradable.  Dolor abdominal nuevo o más intenso.     Tiene síntomas de un coágulo de tayo en la pierna (llamado trombosis venosa profunda), tales charlie:  Dolor en la pantorrilla, la parte posterior de la rodilla, el muslo o la levar.  Hinchazón en la pierna o la levar.  Un cambio de color en la pierna o la levar. La piel podría estar enrojecida o morada, según cuál sea stubbs color de piel normal.     Tiene señales de preeclampsia, tales charlie:  Hinchazón repentina de la mauricio, las christiano o los pies.  Nuevos problemas de visión (charlie oscurecimiento, rosanne borroso o rosanne puntos).  Dolor de shawna intenso.     Tiene señales de insuficiencia cardíaca, tales charlie:  Nueva o mayor falta de aire.  Nueva o mayor hinchazón en las piernas, los tobillos o los pies.  Aumento repentino de peso, charlie más de 2 o 3 libras (0.9 kg o 1.4 kg) en un día o 5 libras (2.3 kg) en mark semana.  Se siente tan cansada o débil que no puede hacer christian actividades habituales.     Se sometió a un alivio del dolor raquídeo o epidural y  "tiene:  Dolor de espalda nuevo o peor.  Aumento del dolor, la hinchazón, la temperatura o el enrojecimiento en el lugar de la inyección.  Hormigueo, debilidad o entumecimiento en las piernas o la levar.   Preste especial atención a los cambios en stubbs hazel y asegúrese de comunicarse con stubbs médico si:    El sangrado vaginal no disminuye.     Siente tristeza, ansiedad o desesperanza yahaira más de algunos días.     Está teniendo problemas con los senos o la lactancia.    Dónde puede encontrar más información en inglés?  Vaya a [email or web address]  Escriba M806 en la búsqueda para aprender más acerca de \"Parto por cesárea: Qué esperar en el hogar.\"  Revisado: 10 clifford, 2023               Versión del contenido: 14.0    [id number] [employer], [employer].   Las instrucciones de cuidado fueron adaptadas bajo licencia por stubbs profesional de atención médica. Si usted tiene preguntas sobre mark afección médica o sobre estas instrucciones, siempre pregunte a stubbs profesional de hazel. [employer], [employer] niega toda garantía o responsabilidad por stubbs uso de esta información.      "

## 2024-07-27 NOTE — PLAN OF CARE
Goal Outcome Evaluation:         Data: Vital signs within normal limits. Postpartum checks within normal limits - see flow record. Patient eating and drinking normally. Patient able to empty bladder independently and is up ambulating. No apparent signs of infection. Incision healing well - interdry applied to pannus to keep incision dry.. Patient performing self cares and is able to care for infant. EDS score of 3. Patient took shower and tolerated. IV abx complete and IV removed.  Action: Patient medicated during the shift for pain. See MAR. Patient reassessed within 1 hour after each medication and pain was improved - patient stated she was comfortable. Patient education done about breastfeeding, incision care. See flow record.  Response: Positive attachment behaviors observed with infant. Support persons nikki and mother present.   Plan: Anticipate discharge on 7/27.

## 2024-07-27 NOTE — PLAN OF CARE
Goal Outcome Evaluation:      Plan of Care Reviewed With: patient, spouse    Overall Patient Progress: improvingOverall Patient Progress: improving    Postpartum assessments WDL. VSS. Pain managed relatively well with Tylenol, Ibuprofen and oxy PRN. Fundus firm, midline and below the U. Scant amounts of lochia. Breastfeeding infant on demand, cluster feeding during the night. Lactation released, anticipated to see patient prior to discharge. Infant and mother showing positive signs of attachment. FOB present at bedside. Call light within reach. Continue with plan of care.    .Nehal Deutsch RN

## 2024-07-27 NOTE — PROGRESS NOTES
[facility]   Postpartum Note    Name:  Lenka Adams  MRN: [id number]    S: Patient is feeling well. Pain well controlled. Voiding spontaneously. Tolerating regular diet without nausea or vomiting. Passing flatus, but no BM yet. Ambulating without dizziness. Lochia appropriate. Breast feeding without difficulty. Denies fever, chills. Wondering if she can go home today.     O:   Patient Vitals for the past 24 hrs:   BP Temp Temp src Pulse Resp Weight   24 0814 115/64 97.5  F (36.4  C) Oral 80 18 145 kg (319 lb 10.7 oz)   24 0023 118/60 98.4  F (36.9  C) Oral 91 16 --   24 1641 124/61 98  F (36.7  C) Oral 91 16 --     Gen: Resting comfortably. NAD.  CV: Regular rate. Well perfused.  Pulm: Non-labored breathing on room air.  Abd: Soft, appropriately tender, non-distended. Fundus below umbilicus, firm and non-tender. Incision c/d/i.  Ext: Non-tender, 1+ LE edema bilaterally.    I/O last 3 completed shifts:  In: -   Out: 1600 [Urine:1600]    Hgb:   Hemoglobin   Date Value Ref Range Status   2024 9.6 (L) 11.7 - 15.7 g/dL Final       Assessment/Plan:  Lekna Adams 28 year old  on POD #2 s/p PLTCS for failed IOL and category II FHT. Pregnancy notable for rubella NI, varicella NI. Doing well postpartum. Remains afebrile, now off IV antibiotics since last night.    # Postpartum management  - Rh positive  - Rubella non-immune, offer vaccine PTD  - Varicella non-immune, offer vaccine PTD  - Pain: Well controlled with Toradol > ibuprofen, Tylenol, and oxycodone PRN  - Hgb 11.9 >  > 10.2 > 9.6; asymptomatic from acute blood loss anemia, will discharge with PO iron  - GI: Bowel regimen and antiemetics PRN, simethicone PRN  - : Voiding s/p Araiza  - PPX: Lovenox  - Feed: Breast feeding  - BC: POPs    # Postpartum endometritis  - Tm/l 100.4F at 5:52 PM on 7/25, afebrile >24 hours since  - S/p 24h Unasyn (clindamycin allergy)    # COVID  infection  - Stable on room air, continue supportive care  - D#4/5 Paxlovid    # gHTN  - HELLP labs wnl  - Normotensive without medications    Medically Ready for Discharge: Possibly today.    Lillian Rea MD  OB/GYN PGY-3  07/27/2024 9:11 AM    Appreciate note by Dr. Rea. Patient has been seen and examined by me separate from the resident, agree with above note. Doing well, ready for discharge. AF >24hrs. Reviewed discharge instructions.     Yesenia Helton MD  10:05 AM

## 2024-07-27 NOTE — LACTATION NOTE
This note was copied from a baby's chart.  Consult for:  First time breastfeeding     Infant Name: Roland    Infant's Primary Care Clinic:  [facility]    Delivery Information:  Roland was born at Gestational Age: 40w2d via   delivery on 2024 11:48 AM     Maternal Health History:    Information for the patient's mother:  Lenka Nieves [[id number]]     Patient Active Problem List   Diagnosis    Prepregnancy BMI 43- early 1hr Growth 28, 34, weekly BPP 34 weeks IOL 39-39+6-  @ 1930    HRP (high risk pregnancy), unspecified trimester    Need for immunization against rubella    Maternal varicella, non-immune    Vitamin D deficiency    Obesity complicating pregnancy    Encounter for induction of labor    Elevated blood pressure reading without diagnosis of hypertension    Infection due to 2019 novel coronavirus     and   Information for the patient's mother:  Lenka Nieves [[id number]]     Medications Prior to Admission   Medication Sig Dispense Refill Last Dose    cetirizine (ZYRTEC) 10 MG tablet Take 10 mg by mouth daily   Past Week    famotidine (PEPCID) 20 MG tablet Take 1 tablet (20 mg) by mouth 2 times daily 90 tablet 0 2024    Prenatal Vit-Fe Fumarate-FA (PRENATAL MULTIVITAMIN W/IRON) 27-0.8 MG tablet Take 1 tablet by mouth daily 90 tablet 3 2024    SENNA-docusate sodium (SENNA S) 8.6-50 MG tablet Take 1 tablet by mouth at bedtime 60 tablet 2 Past Month    [DISCONTINUED] acetaminophen (TYLENOL) 500 MG tablet Take 500-1,000 mg by mouth every 6 hours as needed for mild pain   2024    [DISCONTINUED] aspirin 81 MG EC tablet Take 1 tablet (81 mg) by mouth daily 90 tablet 3 2024    [DISCONTINUED] cholecalciferol (VITAMIN D3) 125 mcg (5000 units) capsule Take 1 capsule (125 mcg) by mouth daily Take one capsule daily. 90 capsule 3 Unknown          Maternal Breast Exam:  Lenka noted breast sensitivity and changes in early pregnancy. She denies any history of  breast/chest injury or surgery. Her breasts are soft and symmetrical with bilateral tender but intact, everted nipples. She was able to hand express colostrum. ?    Breastfeeding/ Lactation History: first baby    Infant information: Roland was AGA at birth and has age appropriate output and weight loss.      Weight Change Since Birth: -2% at 24 hours old     Oral exam of baby:  He has normal jaw and palate, fairly good length of tongue palpable beyond lingual frenulum attachment, extends well beyond gum ridge & organized when sucking on finger.     Feeding History: Mom reports feedings going quite well, just today feeling a little sore but has no pain while he's nursing.    Feeding Assessment:  Lenka brought Roland to breast and latched independently, shallow just on nipple and endorses slight discomfort. With permission, coached her to break seal then demonstrate two different ways she can aim nipple higher in mouth and get deeper latch. She returned demo on both sides, reports comfortable and able to point out difference between nutritive and non nutritive sucking, how to listen and look for swallows.     Education:   [x] Expected  feeding patterns in the first few days (pg. 38 of Your Guide to To Postpartum and  Care)/ the Second Night  [x] Stages of milk production  [] Benefits of hand expression of colostrum  [x] Early feeding cues     [x] Benefits of feeding on cue  [x] Benefits of skin to skin  [x] Breastfeeding positions  [x] Tips to get and maintain a deep latch  [x] Nutritive vs.non-nutritive sucking  [x] Gentle breast compressions as needed to enhance milk transfer  [x] How to tell when baby is finished  [x] How to tell if baby is getting enough  [x] Expected  output  [x] [place] weight loss  [x] Infant Feeding Log  [] [employer] [facility] Breastfeeding/Pumping videos  [x] Signs breastfeeding is going well (comfortable latch, audible swallows, age appropriate output and weight loss)     [x] Tips to prevent engorgement  [x] Signs of engorgement  [x] Tips to manage engorgement  [x] Pumping recommendations (none recommended currently, answered questions about when she would pump and described how to use her Spectra pump she has at home and gave video reference to use at the time)  [] [facility] breast pump part/infant feeding supplies cleaning recommendations  [x] Inpatient breastfeeding support  [x] Outpatient lactation resources added in number for Women's Care Line for scheduling lactation visit.    Handouts: Infant Feeding Log (Week 1, Your Guide to Postpartum &  Care Book) and [facility] Lactation Resources    Home Breast Pump: Spectra S2    Plan: Continue breastfeeding on cue with RN support as needed, goal of 8-12 feedings per day.     Encourage frequent skin to skin and hand expression.     Encouraged follow up with outpatient lactation consultant after discharge due to first time breastfeeding. Family plans to [facility] up with [facility].      Emily Wall, RN, IBCLC   Lactation Consultant  [place]: [facility] [phone]  Office: [phone]

## 2024-07-30 ENCOUNTER — MYC MEDICAL ADVICE (OUTPATIENT)
Dept: OBGYN | Facility: CLINIC | Age: 29
End: 2024-07-30
Payer: COMMERCIAL

## 2024-07-30 DIAGNOSIS — Z98.891 S/P CESAREAN SECTION: Primary | ICD-10-CM

## 2024-07-30 RX ORDER — HYDROMORPHONE HYDROCHLORIDE 2 MG/1
2 TABLET ORAL EVERY 6 HOURS PRN
Qty: 10 TABLET | Refills: 0 | Status: SHIPPED | OUTPATIENT
Start: 2024-07-30 | End: 2024-08-02

## 2024-07-31 ENCOUNTER — TELEPHONE (OUTPATIENT)
Dept: OBGYN | Facility: CLINIC | Age: 29
End: 2024-07-31
Payer: COMMERCIAL

## 2024-07-31 ENCOUNTER — LACTATION ENCOUNTER (OUTPATIENT)
Dept: OTHER | Facility: CLINIC | Age: 29
End: 2024-07-31

## 2024-07-31 NOTE — LACTATION NOTE
This note was copied from a baby's chart.  Lactation Admission Note      Baby Information:  Infant's first name:  Roland  Infant medical history: hyperbili, dehydration     needed? No    Lactation goal (if known): Breastfeed  Lactation history: First baby    Mother's Information: Name: Lenka Johny Adams  Occupation:    Age: 27  Delivery type:     [place]: [place]  Pump for home use:  Spectra S2    Partner's name: Kody Martinez  Occupation:      Relevant maternal medical and social hx:       Information for the patient's mother:  Lenka Nieves [[id number]]     Past Medical History:   Diagnosis Date    Class 3 severe obesity with serious comorbidity and body mass index (BMI) of 45.0 to 49.9 in adult (H) 2023    ,   Information for the patient's mother:  Lenka Nieves [[id number]]     Patient Active Problem List   Diagnosis    Prepregnancy BMI 43- early 1hr Growth 28, 34, weekly BPP 34 weeks IOL 39-39+6- 7/23 @ 1930    HRP (high risk pregnancy), unspecified trimester    Need for immunization against rubella    Maternal varicella, non-immune    Vitamin D deficiency    Obesity complicating pregnancy    Encounter for induction of labor    Elevated blood pressure reading without diagnosis of hypertension    Infection due to 2019 novel coronavirus    , and   Information for the patient's mother:  Lenka Nieves [[id number]]     No medications prior to admission.          Relevant maternal medications:   None    Maternal risk factors:  Obesity  Surgical birth  COVID infection     Admission Education given:  [x]Admission packet  [x]Kangaroo care  [x]Benefits of breast milk  [x]How breast milk is made  [x]Stages of milk production  [x]Milk supply/ goal volumes  [x]Hand expression  [x]Hands-on pumping  [x]Collecting, labeling, transporting milk  [x]Cleaning, sanitizing pump parts  [x]Storage of milk  [x]Benefits and use of pasteurized donor human milk    I  helped her pump; 24mm flanges looked tight so I gave her 27mm to try for next time.  Will return to help babe latch, and assess flange fit.    Emily Barron, RNC-ADELINE, IBCLC   Lactation Consultant  [place]: [facility] [phone]  Office: [phone]

## 2024-07-31 NOTE — LACTATION NOTE
"This note was copied from a baby's chart.  Lactation Follow Up Note    Reason for visit/ call/ message:  Help with feeding; watch pump    Skin to skin/ nuzzling/ latching:  We discussed supportive hold, positioning, latch, breastfeeding patterns and infant driven feeding, breast support and compressions, use/rationale of the nipple shield, skin to skin benefits, and timing of pumpings around breastfeedings.  He latched excellent on both sides, mother with great technique as well, but sustained nutritive sucks were not seen.  Some scattered bigger sucks with stimulation, but he did not fall into a nutritive pattern.  With mom's permission we tried a 20mm shield, just to see if we could get nutritive sucks, but chela did not want to latch to the shield and was put back to breast.  I then helped her pump with 27mm flanges, which looked like a better fit    Education given:  Reasons babies could be sleepy and not removing milk well (IV fluids, hyperbili) and that his stamina and skill will improve with time  Importance of good time management when \"triple feeding\"  Importance of preserving supply if babe isn't moving milk    Plan:  Keep working on latch; top off with bottle if not nutritively drinking  Consider Babyweigh scale if off IV fluids and still unable to assess transfer  Keep pumping as long as supplements are needed    Emily Barron, RNC-ADELINE, IBCLC   Lactation Consultant  [place]: [facility] [phone]  Office: [phone]      "

## 2024-07-31 NOTE — LACTATION NOTE
This note was copied from a baby's chart.  Please see initial consult note from [facility], copied and pasted below, visit completed on 24.      Kath Wall, RN   Registered Nurse  Lactation     Lactation Note  Signed     Date of Service: 2024 10:45 AM  Creation Time: 2024 10:45 AM   Related encounter: Admission (Discharged) from 2024 in [facility] Nurs[facility]   suggestion   This note was copied to the following chart(s): Lenka Nieves          Consult for:  First time breastfeeding      Infant Name: Roland     Infant's Primary Care Clinic:  [facility]     Delivery Information:  Roland was born at Gestational Age: 40w2d via   delivery on 2024 11:48 AM      Maternal Health History:    Information for the patient's mother:  Lenka Nieves [[id number]]          Patient Active Problem List   Diagnosis    Prepregnancy BMI 43- early 1hr Growth 28, 34, weekly BPP 34 weeks IOL 39-39+6-  @ 1930    HRP (high risk pregnancy), unspecified trimester    Need for immunization against rubella    Maternal varicella, non-immune    Vitamin D deficiency    Obesity complicating pregnancy    Encounter for induction of labor    Elevated blood pressure reading without diagnosis of hypertension    Infection due to 2019 novel coronavirus     and   Information for the patient's mother:  Lenka Nieves [[id number]]              Medications Prior to Admission   Medication Sig Dispense Refill Last Dose    cetirizine (ZYRTEC) 10 MG tablet Take 10 mg by mouth daily     Past Week    famotidine (PEPCID) 20 MG tablet Take 1 tablet (20 mg) by mouth 2 times daily 90 tablet 0 2024    Prenatal Vit-Fe Fumarate-FA (PRENATAL MULTIVITAMIN W/IRON) 27-0.8 MG tablet Take 1 tablet by mouth daily 90 tablet 3 2024    SENNA-docusate sodium (SENNA S) 8.6-50 MG tablet Take 1 tablet by mouth at bedtime 60 tablet 2 Past Month    [DISCONTINUED] acetaminophen (TYLENOL) 500 MG  tablet Take 500-1,000 mg by mouth every 6 hours as needed for mild pain     2024    [DISCONTINUED] aspirin 81 MG EC tablet Take 1 tablet (81 mg) by mouth daily 90 tablet 3 2024    [DISCONTINUED] cholecalciferol (VITAMIN D3) 125 mcg (5000 units) capsule Take 1 capsule (125 mcg) by mouth daily Take one capsule daily. 90 capsule 3 Unknown            Maternal Breast Exam:  Lenka noted breast sensitivity and changes in early pregnancy. She denies any history of breast/chest injury or surgery. Her breasts are soft and symmetrical with bilateral tender but intact, everted nipples. She was able to hand express colostrum. ?     Breastfeeding/ Lactation History: first baby     Infant information: Roland was AGA at birth and has age appropriate output and weight loss.       Weight Change Since Birth: -2% at 24 hours old      Oral exam of baby:  He has normal jaw and palate, fairly good length of tongue palpable beyond lingual frenulum attachment, extends well beyond gum ridge & organized when sucking on finger.      Feeding History: Mom reports feedings going quite well, just today feeling a little sore but has no pain while he's nursing.     Feeding Assessment:  Lenka brought Roland to breast and latched independently, shallow just on nipple and endorses slight discomfort. With permission, coached her to break seal then demonstrate two different ways she can aim nipple higher in mouth and get deeper latch. She returned demo on both sides, reports comfortable and able to point out difference between nutritive and non nutritive sucking, how to listen and look for swallows.      Education:   [x] Expected  feeding patterns in the first few days (pg. 38 of Your Guide to To Postpartum and [place] Care)/ the Second Night  [x] Stages of milk production  [] Benefits of hand expression of colostrum  [x] Early feeding cues                           [x] Benefits of feeding on cue  [x] Benefits of skin to skin  [x]  Breastfeeding positions  [x] Tips to get and maintain a deep latch  [x] Nutritive vs.non-nutritive sucking  [x] Gentle breast compressions as needed to enhance milk transfer  [x] How to tell when baby is finished  [x] How to tell if baby is getting enough  [x] Expected  output  [x] [place] weight loss  [x] Infant Feeding Log  [] [employer] [facility] Breastfeeding/Pumping videos  [x] Signs breastfeeding is going well (comfortable latch, audible swallows, age appropriate output and weight loss)    [x] Tips to prevent engorgement  [x] Signs of engorgement  [x] Tips to manage engorgement  [x] Pumping recommendations (none recommended currently, answered questions about when she would pump and described how to use her Spectra pump she has at home and gave video reference to use at the time)  [] [facility] breast pump part/infant feeding supplies cleaning recommendations  [x] Inpatient breastfeeding support  [x] Outpatient lactation resources added in number for Women's Care Line for scheduling lactation visit.     Handouts: Infant Feeding Log (Week 1, Your Guide to Postpartum &  Care Book) and [facility] Lactation Resources     Home Breast Pump: Spectra S2     Plan: Continue breastfeeding on cue with RN support as needed, goal of 8-12 feedings per day.      Encourage frequent skin to skin and hand expression.      Encouraged follow up with outpatient lactation consultant after discharge due to first time breastfeeding. Family plans to follow up with [facility].        Emily Wall, RN, IBCLC   Lactation Consultant  [facility]: [facility] [phone]  Office: [phone]

## 2024-07-31 NOTE — TELEPHONE ENCOUNTER
Received short term disability forms for this patient.     -Forms printed and left in the midwives tray to be completed  -patient notified via [employer] that the forms were received and we require 7-10 business days to complete and send back. Once completed she will be notified    -routed a message to the rooming staff to let them know that forms were left in the midwives tray to be completed.

## 2024-08-07 ASSESSMENT — EDINBURGH POSTNATAL DEPRESSION SCALE (EPDS)
THE THOUGHT OF HARMING MYSELF HAS OCCURRED TO ME: NEVER
I HAVE BEEN ANXIOUS OR WORRIED FOR NO GOOD REASON: NO, NOT AT ALL
I HAVE BLAMED MYSELF UNNECESSARILY WHEN THINGS WENT WRONG: YES, SOME OF THE TIME
TOTAL SCORE: 8
I HAVE LOOKED FORWARD WITH ENJOYMENT TO THINGS: RATHER LESS THAN I USED TO
I HAVE FELT SCARED OR PANICKY FOR NO GOOD REASON: NO, NOT AT ALL
I HAVE BEEN SO UNHAPPY THAT I HAVE HAD DIFFICULTY SLEEPING: NOT AT ALL
I HAVE FELT SAD OR MISERABLE: NOT VERY OFTEN
I HAVE BEEN SO UNHAPPY THAT I HAVE BEEN CRYING: ONLY OCCASIONALLY
I HAVE BEEN ABLE TO LAUGH AND SEE THE FUNNY SIDE OF THINGS: NOT QUITE SO MUCH NOW
THINGS HAVE BEEN GETTING ON TOP OF ME: YES, SOMETIMES I HAVEN'T BEEN COPING AS WELL AS USUAL

## 2024-08-08 ENCOUNTER — PRENATAL OFFICE VISIT (OUTPATIENT)
Dept: OBGYN | Facility: CLINIC | Age: 29
End: 2024-08-08
Attending: OBSTETRICS & GYNECOLOGY
Payer: COMMERCIAL

## 2024-08-08 VITALS
DIASTOLIC BLOOD PRESSURE: 76 MMHG | HEIGHT: 67 IN | WEIGHT: 293 LBS | HEART RATE: 74 BPM | SYSTOLIC BLOOD PRESSURE: 114 MMHG | BODY MASS INDEX: 45.99 KG/M2

## 2024-08-08 DIAGNOSIS — E66.813 CLASS 3 SEVERE OBESITY DUE TO EXCESS CALORIES WITHOUT SERIOUS COMORBIDITY WITH BODY MASS INDEX (BMI) OF 40.0 TO 44.9 IN ADULT (H): ICD-10-CM

## 2024-08-08 DIAGNOSIS — Z98.891 S/P CESAREAN SECTION: ICD-10-CM

## 2024-08-08 DIAGNOSIS — E66.01 CLASS 3 SEVERE OBESITY DUE TO EXCESS CALORIES WITHOUT SERIOUS COMORBIDITY WITH BODY MASS INDEX (BMI) OF 40.0 TO 44.9 IN ADULT (H): ICD-10-CM

## 2024-08-08 PROCEDURE — 99024 POSTOP FOLLOW-UP VISIT: CPT | Performed by: ADVANCED PRACTICE MIDWIFE

## 2024-08-08 PROCEDURE — 99213 OFFICE O/P EST LOW 20 MIN: CPT | Performed by: ADVANCED PRACTICE MIDWIFE

## 2024-08-08 ASSESSMENT — PAIN SCALES - GENERAL: PAINLEVEL: MILD PAIN (2)

## 2024-08-08 NOTE — PATIENT INSTRUCTIONS
Thank you for trusting us with your care!   Please be aware, if you are on Mychart, you may see your results prior to your providers review. If labs are abnormal, we will call or message you on [employer] with a follow up plan.    If you need to contact us for questions about:  Symptoms, Scheduling & Medical Questions; Non-urgent (2-3 day response) [facility] [facility] message, Urgent (needing response today) [phone] (if after 3:30pm next day response)   Prescriptions: Please call your Pharmacy   Billing: [place] [phone] or [facility]:[phone]

## 2024-08-08 NOTE — PROGRESS NOTES
SUBJECTIVE  28 year old  presents for 2 week post partum visit s/p  delivery on 24 for incision, breastfeeding and mood check.    Pt is doing well. Breastfeeding and bottle feeding with good latch to both breasts. No nipple pain. She is pumping and using formula as well. No concerns at this time for feeding.    Lochia reduced. Notes brown light discharge.    Coping well with support from partner and other.     Incision is healing. Rates pain 3/10. Using motrin and tylenol as needed. More pain with activity but it is improving over time.     OBJECTIVE  General- no apparent distress  Breast- deferred  Abdomen- Fundus non tender  Incision- CDI, steri strips in place and peeling. No signs of infection.   Extremities- no edema  LMP 10/17/2023       ASSESSMENT/PLAN  2 weeks postpartum s/p  delivery  - Discussed warning signs of PPD. Patient feels that her mood is stable.   - Breastfeeding reviewed. Discussed resources including lactation consultant support as needed.   - RTO in 4 weeks for 6 week check and prn if questions or concerns    Karyn Pastrana CNM on 2024 at 12:09 PM

## 2024-09-04 ASSESSMENT — EDINBURGH POSTNATAL DEPRESSION SCALE (EPDS)
TOTAL SCORE: 8
I HAVE BLAMED MYSELF UNNECESSARILY WHEN THINGS WENT WRONG: YES, SOME OF THE TIME
I HAVE BEEN ABLE TO LAUGH AND SEE THE FUNNY SIDE OF THINGS: NOT QUITE SO MUCH NOW
I HAVE BEEN ANXIOUS OR WORRIED FOR NO GOOD REASON: NO, NOT AT ALL
THE THOUGHT OF HARMING MYSELF HAS OCCURRED TO ME: NEVER
I HAVE BEEN SO UNHAPPY THAT I HAVE BEEN CRYING: ONLY OCCASIONALLY
THINGS HAVE BEEN GETTING ON TOP OF ME: YES, SOMETIMES I HAVEN'T BEEN COPING AS WELL AS USUAL
I HAVE BEEN SO UNHAPPY THAT I HAVE HAD DIFFICULTY SLEEPING: NOT AT ALL
I HAVE FELT SAD OR MISERABLE: NOT VERY OFTEN
I HAVE FELT SCARED OR PANICKY FOR NO GOOD REASON: NO, NOT AT ALL
I HAVE LOOKED FORWARD WITH ENJOYMENT TO THINGS: RATHER LESS THAN I USED TO

## 2024-09-05 ENCOUNTER — PRENATAL OFFICE VISIT (OUTPATIENT)
Dept: OBGYN | Facility: CLINIC | Age: 29
End: 2024-09-05
Attending: ADVANCED PRACTICE MIDWIFE
Payer: COMMERCIAL

## 2024-09-05 VITALS
HEART RATE: 71 BPM | DIASTOLIC BLOOD PRESSURE: 77 MMHG | HEIGHT: 67 IN | SYSTOLIC BLOOD PRESSURE: 115 MMHG | WEIGHT: 293 LBS | BODY MASS INDEX: 45.99 KG/M2

## 2024-09-05 PROBLEM — Z34.90 ENCOUNTER FOR INDUCTION OF LABOR: Status: RESOLVED | Noted: 2024-07-23 | Resolved: 2024-09-05

## 2024-09-05 PROBLEM — E66.813 CLASS 3 SEVERE OBESITY WITHOUT SERIOUS COMORBIDITY WITH BODY MASS INDEX (BMI) OF 40.0 TO 44.9 IN ADULT (H): Status: RESOLVED | Noted: 2023-02-21 | Resolved: 2024-09-05

## 2024-09-05 PROBLEM — E66.01 CLASS 3 SEVERE OBESITY WITHOUT SERIOUS COMORBIDITY WITH BODY MASS INDEX (BMI) OF 40.0 TO 44.9 IN ADULT (H): Status: RESOLVED | Noted: 2023-02-21 | Resolved: 2024-09-05

## 2024-09-05 PROBLEM — O09.90 HRP (HIGH RISK PREGNANCY), UNSPECIFIED TRIMESTER: Status: RESOLVED | Noted: 2024-01-08 | Resolved: 2024-09-05

## 2024-09-05 PROCEDURE — 99207 PR POST PARTUM EXAM: CPT | Performed by: ADVANCED PRACTICE MIDWIFE

## 2024-09-05 PROCEDURE — 99213 OFFICE O/P EST LOW 20 MIN: CPT | Performed by: ADVANCED PRACTICE MIDWIFE

## 2024-09-05 NOTE — PROGRESS NOTES
Nursing Notes:   MONICA GALICIA  2024 11:37 AM  Signed  Chief Complaint   Patient presents with    Postpartum Care     6 weeks PP     SUBJECTIVE:   Lenka Adams is here for her 6-week postpartum checkup.     PHQ-9 score: edinburgh:   Hx of Abuse:  No    Delivery Date: 24.    Delivering provider:  Yesenia Helton MD.    Type of delivery:  .     Delivery complications: failed IOL, pp anemia and endometritis  Infant gender:  boy, weight 7 pounds 13.6 oz.  Feeding Method:   and Bottlefed.  Pumping and formula Complications reported with feeding:  none, infant thriving .    Bleeding:  Moderate.  Duration:  4 or 5.  Menses resumed:  Yes -   Bowel/Urinary problems:  No    Contraception Planned:  mini pill- haven't started it yet, wants more info about contraception during breast feeding  She  has not had intercourse since delivery..        ================================================================   Pt is 6 weeks postpartum from [facility] on 2024 for arrest of dilation.  Postpartum course complicated by postpartum endometritis and acute blood loss anemia.   Pt reports doing well in postpartum period.   Has been pumping and giving baby mostly breast milk. Has been having difficulty with latch, feels latch is shallow and is causing pain.  Has appt with lactation consultant tomorrow.  Does not think baby has been evaluated for lip or tongue tie.   Reports incision has been healing well.  Denies redness, discharge or pain.   Denies any bowel or bladder issues.   Reports bleeding stopped few weeks ago, had bleeding last week lasting 4 days, light- comparable to light period.   Reports mood is okay, feels like current EPDS score is baseline for her.  Denies any SI/HI.  Reports feeling her mood has improved, able to find moments of abdelrahman with baby.   Has not resumed intercourse.   Planning POPs, has questions about breastfeeding safety.    Pap UTD.  Rubella and varicella NON  "immune, agreeable to vaccines.  ================================================================  ROS: 10 point ROS neg other than the symptoms noted above in the HPI.     EXAM:  /77   Pulse 71   Ht 1.702 m (5' 7\")   Wt 139.6 kg (307 lb 11.2 oz)   LMP 08/30/2024   Breastfeeding Yes   BMI 48.19 kg/m      General: healthy, alert, and no distress  Psych: NEGATIVE  Breasts:  deferred  Abdomen: Benign, Soft, flat, non-tender, No masses, organomegaly, and Diastasis less than 1-2 FB  Incision:  well healed   Vulva:   pelvic exam deferred    ASSESSMENT:   Encounter Diagnosis   Name Primary?    Routine postpartum follow-up Yes      Normal postpartum exam after PLTCS  Pregnancy was complicated by:  Endometritis and GHTN.      PLAN:  Orders Placed This Encounter   Procedures    MMR VIRUS IMMUNIZATION, SUBCUT      No orders of the defined types were placed in this encounter.     Contraception methods discussed.  Discussed back-up method x 1 week after starting POPs.    Signs and symptoms of postpartum depression/anxiety discussed and resources offered if desired.  Pt declined, but aware of options.   Pap due 2027  MMR vaccine given today.  Encouraged to receive varicella vaccine at pharmacy.  Follow up in 1 year    ERIC Baker CNM    "

## 2024-09-05 NOTE — NURSING NOTE
Chief Complaint   Patient presents with    Postpartum Care     6 weeks PP     SUBJECTIVE:   Lenka Adams is here for her 6-week postpartum checkup.     PHQ-9 score: edinburgh:   Hx of Abuse:  No    Delivery Date: 24.    Delivering provider:  Yesenia Helton MD.    Type of delivery:  .     Delivery complications: failed IOL, pp anemia and endometritis  Infant gender:  boy, weight 7 pounds 13.6 oz.  Feeding Method:   and Bottlefed.  Pumping and formula Complications reported with feeding:  none, infant thriving .    Bleeding:  Moderate.  Duration:  4 or 5.  Menses resumed:  Yes -   Bowel/Urinary problems:  No    Contraception Planned:  mini pill- haven't started it yet, wants more info about contraception during breast feeding  She  has not had intercourse since delivery..

## 2024-09-17 ENCOUNTER — MEDICAL CORRESPONDENCE (OUTPATIENT)
Dept: HEALTH INFORMATION MANAGEMENT | Facility: CLINIC | Age: 29
End: 2024-09-17
Payer: COMMERCIAL

## 2024-11-04 ENCOUNTER — MYC MEDICAL ADVICE (OUTPATIENT)
Dept: OBGYN | Facility: CLINIC | Age: 29
End: 2024-11-04
Payer: COMMERCIAL

## 2024-11-04 DIAGNOSIS — Z30.41 ENCOUNTER FOR SURVEILLANCE OF CONTRACEPTIVE PILLS: Primary | ICD-10-CM

## 2024-11-04 DIAGNOSIS — Z30.9 CONTRACEPTIVE MANAGEMENT: ICD-10-CM

## 2024-11-05 RX ORDER — ACETAMINOPHEN AND CODEINE PHOSPHATE 120; 12 MG/5ML; MG/5ML
0.35 SOLUTION ORAL DAILY
Qty: 1 TABLET | Refills: 3 | Status: SHIPPED | OUTPATIENT
Start: 2024-11-05

## 2024-11-20 DIAGNOSIS — Z32.01 PREGNANCY TEST POSITIVE: ICD-10-CM

## 2024-11-21 RX ORDER — PRENATAL VIT/IRON FUM/FOLIC AC 27MG-0.8MG
1 TABLET ORAL DAILY
Qty: 90 TABLET | Refills: 0 | Status: SHIPPED | OUTPATIENT
Start: 2024-11-21

## 2024-12-02 ENCOUNTER — OFFICE VISIT (OUTPATIENT)
Dept: OPTOMETRY | Facility: CLINIC | Age: 29
End: 2024-12-02
Payer: COMMERCIAL

## 2024-12-02 DIAGNOSIS — H52.223 REGULAR ASTIGMATISM OF BOTH EYES: ICD-10-CM

## 2024-12-02 DIAGNOSIS — Z01.00 EXAMINATION OF EYES AND VISION: Primary | ICD-10-CM

## 2024-12-02 DIAGNOSIS — H52.13 MYOPIA OF BOTH EYES: ICD-10-CM

## 2024-12-02 PROCEDURE — 92004 COMPRE OPH EXAM NEW PT 1/>: CPT

## 2024-12-02 PROCEDURE — 92015 DETERMINE REFRACTIVE STATE: CPT

## 2024-12-02 ASSESSMENT — CONF VISUAL FIELD
OD_NORMAL: 1
OD_INFERIOR_TEMPORAL_RESTRICTION: 0
OD_SUPERIOR_NASAL_RESTRICTION: 0
OS_SUPERIOR_NASAL_RESTRICTION: 0
OS_SUPERIOR_TEMPORAL_RESTRICTION: 0
OD_INFERIOR_NASAL_RESTRICTION: 0
OS_INFERIOR_TEMPORAL_RESTRICTION: 0
OD_SUPERIOR_TEMPORAL_RESTRICTION: 0
OS_NORMAL: 1
OS_INFERIOR_NASAL_RESTRICTION: 0

## 2024-12-02 ASSESSMENT — CUP TO DISC RATIO
OS_RATIO: 0.45
OD_RATIO: 0.35

## 2024-12-02 ASSESSMENT — VISUAL ACUITY
OD_SC: 20/50
OD_SC: 20/20
METHOD: SNELLEN - LINEAR
OS_SC+: -2
OS_SC: 20/20
OD_SC+: -1
OD_PH_SC: 20/25
OS_SC: 20/30
OD_PH_SC+: +2

## 2024-12-02 ASSESSMENT — REFRACTION_MANIFEST
OS_CYLINDER: +1.00
OD_SPHERE: -2.00
OS_SPHERE: -1.50
OD_AXIS: 074
METHOD_AUTOREFRACTION: 1
OS_AXIS: 105
OD_AXIS: 073
OD_SPHERE: -2.00
OS_SPHERE: -1.25
OS_CYLINDER: +0.75
OD_CYLINDER: +1.50
OS_AXIS: 100
OD_CYLINDER: +1.50

## 2024-12-02 ASSESSMENT — KERATOMETRY
OD_AXISANGLE2_DEGREES: 167
OD_AXISANGLE_DEGREES: 77
OS_K2POWER_DIOPTERS: 43.00
OS_AXISANGLE_DEGREES: 92
OD_K1POWER_DIOPTERS: 41.00
OS_AXISANGLE2_DEGREES: 2
OS_K1POWER_DIOPTERS: 41.00
OD_K2POWER_DIOPTERS: 43.25

## 2024-12-02 ASSESSMENT — EXTERNAL EXAM - LEFT EYE: OS_EXAM: NORMAL

## 2024-12-02 ASSESSMENT — TONOMETRY
IOP_METHOD: TONOPEN
OD_IOP_MMHG: 15
OS_IOP_MMHG: 15

## 2024-12-02 ASSESSMENT — EXTERNAL EXAM - RIGHT EYE: OD_EXAM: NORMAL

## 2024-12-02 ASSESSMENT — SLIT LAMP EXAM - LIDS
COMMENTS: NORMAL
COMMENTS: NORMAL

## 2024-12-02 NOTE — PROGRESS NOTES
Chief Complaint   Patient presents with    Annual Eye Exam      Last Eye Exam: 3 years ago   Dilated Previously: Yes    What are you currently using to see?  does not use glasses or contacts    Distance Vision Acuity: Noticed gradual change in both eyes    Near Vision Acuity: Satisfied with vision while reading and using computer unaided    Eye Comfort: good  Do you use eye drops? : No    Denelle Shaunna - Optometric Assistant      Medical, surgical and family histories reviewed and updated 12/2/2024.       OBJECTIVE: See Ophthalmology exam    Assessment/Plan  (Z01.00) Examination of eyes and vision  (primary encounter diagnosis)  Plan:   -No Ocular Pathology noted  -DFE WNL each eye.    (H52.13) Myopia of both eyes, (H52.223) Regular astigmatism of both eyes  Plan:   -New Glasses Rx Given.   -Discussed giving a couple of weeks to get adjusted to new glasses.   -Monitor.     Return to clinic for yearly CEE.     Complete documentation of historical and exam elements from today's encounter can be found in the full encounter summary report (not reduplicated in this progress note). I personally obtained the chief complaint(s) and history of present illness. I confirmed and edited as necessary the review of systems, past medical/surgical history, family history, social history, and examination findings as document by others; and I examined the patient myself. I personally reviewed the relevant tests, images, and reports as documented above. I formulated and edited as necessary the assessment and plan and discussed the findings and management plan with the patient and family.    Efe Membreno, OD

## 2024-12-02 NOTE — LETTER
12/2/2024      Lenka Adams  [address]  [facility] [address]      Dear Colleague,    Thank you for referring your patient, Lenka Adams, to the [facility]. Please see a copy of my visit note below.    Chief Complaint   Patient presents with     Annual Eye Exam      Last Eye Exam: 3 years ago   Dilated Previously: Yes    What are you currently using to see?  does not use glasses or contacts    Distance Vision Acuity: Noticed gradual change in both eyes    Near Vision Acuity: Satisfied with vision while reading and using computer unaided    Eye Comfort: good  Do you use eye drops? : No    Clarke Becerrapps - Optometric Assistant      Medical, surgical and family histories reviewed and updated 12/2/2024.       OBJECTIVE: See Ophthalmology exam    Assessment/Plan  (Z01.00) Examination of eyes and vision  (primary encounter diagnosis)  Plan:   -No Ocular Pathology noted  -DFE WNL each eye.    (H52.13) Myopia of both eyes, (H52.223) Regular astigmatism of both eyes  Plan:   -New Glasses Rx Given.   -Discussed giving a couple of weeks to get adjusted to new glasses.   -Monitor.     Return to clinic for yearly CEE.     Complete documentation of historical and exam elements from today's encounter can be found in the full encounter summary report (not reduplicated in this progress note). I personally obtained the chief complaint(s) and history of present illness. I confirmed and edited as necessary the review of systems, past medical/surgical history, family history, social history, and examination findings as document by others; and I examined the patient myself. I personally reviewed the relevant tests, images, and reports as documented above. I formulated and edited as necessary the assessment and plan and discussed the findings and management plan with the patient and family.    Efe Membreno, OD        Again, thank you for allowing me to participate in the  care of your patient.        Sincerely,        Efe Membreno Jr., OD

## 2024-12-02 NOTE — PATIENT INSTRUCTIONS
The affects of the dilating drops last for 4- 6 hours.  You will be more sensitive to light and vision will be blurry up close.  Do not drive if you do not feel comfortable.  Mydriatic sunglasses were given if needed.       Optometry Providers       Clinic Locations                                 Telephone Number   Dr. Elli Martines    [facility]  [place] [phone]     [facility] Hours:                [place] [facility] Hours:       [facility] [place] [facility] Hours:   [address]   [address] [address]      [address]  [place] [address]   [place] [address]    [place] [address]  Phone: [phone]                    Phone: [phone]     Phone: [phone]                      Monday 8:00-6:00                          Monday 8:00-6:00                          Monday 8:00-6:00              Tuesday 8:00-6:00                          Tuesday 8:00-6:00                          Tuesday 8:00-6:00              Wednesday 8:00-6:00                  Wednesday 8:00-6:00                   Wednesday 8:00-6:00      Thursday 8:00-6:00                        Thursday 8:00-6:00                         Thursday 8:00-6:00            Friday 8:00-5:00                              Friday 8:00-5:00                              Friday 8:00-5:00    [place] [facility] Hours:   [address] [place] [address]  [phone]    Monday 9:00-6:00  Tuesday 9:00-6:00  Wednesday 9:00-6:00  Thursday 9:00-6:00  Friday 9:00-5:00  As always, Thank you for trusting us with your health care needs!

## 2025-01-29 ENCOUNTER — MEDICAL CORRESPONDENCE (OUTPATIENT)
Dept: HEALTH INFORMATION MANAGEMENT | Facility: CLINIC | Age: 30
End: 2025-01-29
Payer: COMMERCIAL

## 2025-03-16 ENCOUNTER — HEALTH MAINTENANCE LETTER (OUTPATIENT)
Age: 30
End: 2025-03-16

## 2025-04-15 ENCOUNTER — VIRTUAL VISIT (OUTPATIENT)
Dept: ENDOCRINOLOGY | Facility: CLINIC | Age: 30
End: 2025-04-15
Payer: COMMERCIAL

## 2025-04-15 VITALS — WEIGHT: 293 LBS | BODY MASS INDEX: 53.25 KG/M2

## 2025-04-15 DIAGNOSIS — E66.813 CLASS 3 SEVERE OBESITY WITH SERIOUS COMORBIDITY AND BODY MASS INDEX (BMI) OF 45.0 TO 49.9 IN ADULT, UNSPECIFIED OBESITY TYPE: Primary | ICD-10-CM

## 2025-04-15 PROCEDURE — 98006 SYNCH AUDIO-VIDEO EST MOD 30: CPT

## 2025-04-15 PROCEDURE — 1126F AMNT PAIN NOTED NONE PRSNT: CPT | Mod: 95

## 2025-04-15 ASSESSMENT — PAIN SCALES - GENERAL: PAINLEVEL_OUTOF10: NO PAIN (0)

## 2025-04-15 NOTE — PROGRESS NOTES
"Virtual Visit Details    Type of service:  Video Visit     Originating Location (pt. Location): {video visit patient location:039044::\"Home\"}  {PROVIDER LOCATION On-site should be selected for visits conducted from your clinic location or adjoining [facility] hospital, academic office, or other nearby [facility] building. Off-site should be selected for all other provider locations, including home:690512}  Distant Location (provider location):  {virtual location provider:151978}  Platform used for Video Visit: {Virtual Visit Platforms:897811::\"[employer]\"}        "

## 2025-04-15 NOTE — NURSING NOTE
Current patient location: [address] [place] [id number]    Is the patient currently in the state of [place]? YES    Visit mode:VIDEO    If the visit is dropped, the patient can be reconnected by: VIDEO VISIT: Text to cell phone:   Telephone Information:   Mobile [phone]       Will anyone else be joining the visit? NO  (If patient encounters technical issues they should call [phone])    Are changes needed to the allergy or medication list? Pt stated no changes to allergies and Pt stated no med changes    Are refills needed on medications prescribed by this physician? NO    Reason for visit: RECHECK    Milagros PAK

## 2025-04-15 NOTE — LETTER
4/15/2025       RE: Lenka Adams  [address]  [facility] [address]     Dear Colleague,    Thank you for referring your patient, Lenka Adams, to the [facility] [place] [facility]. Please see a copy of my visit note below.    Virtual Visit Details    Type of service:  Video Visit     Originating Location (pt. Location): Home    Distant Location (provider location):  Off-site  Platform used for Video Visit: [facility] Medical Weight Management Note     Lenka Adams  MRN:  [id number]  :  1995  JOHN:  4/15/2025    Dear [facility] - Ramya Salvador,    I had the pleasure of seeing your patient Lenka Adams. She is a 29 year old female who I am continuing to see for treatment of obesity related to:        2/15/2023     3:39 PM   --   I have the following health issues associated with obesity None of the above   I have the following symptoms associated with obesity Back Pain    Fatigue       Assessment & Plan  Problem List Items Addressed This Visit       Class 3 severe obesity with serious comorbidity and body mass index (BMI) of 45.0 to 49.9 in adult, unspecified obesity type (H) - Primary     Last seen 2023.  Had a positive pregnancy 2023 and stopped all EOMs.  Gained 40 pounds during pregnancy.  And has gained another 20 during her postpartum.  She is currently pumping twice a day, but is in the process of completely weaning.    She is here today to discuss weight loss and is hoping to get back on an AOM.  She was previously on Saxenda for 1 month before finding out she was pregnant.  However, insurance is no longer covering GLP-1's.  [employer] employee.    She was previously on phentermine and got up to the 37.5 mg dose.  This was helpful with weight loss and some minimal side effects.  She would like to restart this.  She will reach out once  she is no longer breast-feeding and we will send the prescription at that time.           Reach out once no longer breast feeding and will start phentermine 18.5mg - 1/2 tablet daily.   Sofia Crowder in 4 months       INTERVAL HISTORY:  New MWM - 2/21/2023. Started Phentermine and ramped up to 37.5mg. Also trialed Naltrexone, but was not helpful overall. Did not feel like she was seeing consistent weight loss. Started Saxenda. Discontinued all medications 11/2023 after positive pregnancy test.       Currently has a 9 month baby boy. Currently pumping 2xday, is in the process of transitioning to formula. Was exclusively pumping.     Starting weight prior to pregancy was 278lb. Gained around 40lbs through pregnancy. Has then gained weight during breast feeding - was hungry a lot more. Has been really hard to regain all the weight she had previously lost. Weight is currently stable and is max weight in life.       Anti-obesity medication history    Current:   Phentermine - was really helpful for weight loss. No side effects.   Saxenda - was on it for around 1 month. Does not remember if it was helpful or if had side effects.       Recent diet changes: Trying to be more mindful of food choices and portion sizes. Feels like she is having some binge eating and larger portions. Felt like this was better controlled prior to pregnancy. Eating 3 meals a day. Feels like she then snacks throughout the day. Drinks 30oz water daily. ICE drinks.     Recent exercise/activity changes: very limited given schedule. Has been harder with weight gain as well.     Recent stressors: overall going well.     Recent sleep changes: no concerns         CURRENT WEIGHT:   340 lbs 0 oz                  Wt Readings from Last 5 Encounters:   04/15/25 (!) 154.2 kg (340 lb)   09/05/24 139.6 kg (307 lb 11.2 oz)   08/08/24 136.1 kg (300 lb)   07/27/24 145 kg (319 lb 10.7 oz)   07/16/24 143.8 kg (317 lb)             4/12/2025     2:07 PM   Changes and  Difficulties   I have made the following changes to my diet since my last visit: no restrictions, eating whatever Luz been wanting   With regards to my diet, I am still struggling with: Binge eating, night cravings   I have made the following changes to my activity/exercise since my last visit: Not very active   With regards to my activity/exercise, I am still struggling with: Feeling energized and motivated         MEDICATIONS:   Current Outpatient Medications   Medication Sig Dispense Refill     norethindrone (MICRONOR) 0.35 MG tablet Take 1 tablet (0.35 mg) by mouth daily. 1 tablet 3     acetaminophen (TYLENOL) 325 MG tablet Take 3 tablets (975 mg) by mouth every 6 hours (Patient not taking: Reported on 9/5/2024) 60 tablet 0     ibuprofen (ADVIL/MOTRIN) 800 MG tablet Take 1 tablet (800 mg) by mouth every 6 hours (Patient not taking: Reported on 9/5/2024) 60 tablet 0     Prenatal Vit-Fe Fumarate-FA (PRENATAL MULTIVITAMIN W/IRON) 27-0.8 MG tablet Take 1 tablet by mouth daily. Patient is due for office visit and/or labs before further refills. (Patient not taking: Reported on 4/15/2025) 90 tablet 0           4/12/2025     2:07 PM   Weight Loss Medication History Reviewed With Patient   Which weight loss medications are you currently taking on a regular basis? None   If you are not taking a weight loss medication that was prescribed to you, please indicate why: Other   Are you having any side effects from the weight loss medication that we have prescribed you? No           Objective   Wt (!) 154.2 kg (340 lb)   BMI 53.25 kg/m    Vitals - Patient Reported  Pain Score: No Pain (0)      Vitals:  No vitals were obtained today due to virtual visit.      PHYSICAL EXAM:  GENERAL: alert and no distress  EYES: Eyes grossly normal to inspection.  No discharge or erythema, or obvious scleral/conjunctival abnormalities.  RESP: No audible wheeze, cough, or visible cyanosis.    SKIN: Visible skin clear. No significant rash,  abnormal pigmentation or lesions.  NEURO: Cranial nerves grossly intact.  Mentation and speech appropriate for age.  PSYCH: Appropriate affect, tone, and pace of words        Sincerely,    Sofia Villatoro PA-C      30 minutes spent by me on the date of the encounter doing chart review, history and exam, documentation and further activities per the note    The longitudinal plan of care for the diagnosis(es)/condition(s) as documented were addressed during this visit. Due to the added complexity in care, I will continue to support Lenka in the subsequent management and with ongoing continuity of care.      Again, thank you for allowing me to participate in the care of your patient.      Sincerely,    Sofia Villatoro PA-C

## 2025-04-15 NOTE — PROGRESS NOTES
Virtual Visit Details    Type of service:  Video Visit     Originating Location (pt. Location): Home    Distant Location (provider location):  Off-site  Platform used for Video Visit: AmWell        Return Medical Weight Management Note     Lenka Adams  MRN:  [id number]  :  1995  JOHN:  4/15/2025    Dear [facility],    I had the pleasure of seeing your patient Lenka Adams. She is a 29 year old female who I am continuing to see for treatment of obesity related to:        2/15/2023     3:39 PM   --   I have the following health issues associated with obesity None of the above   I have the following symptoms associated with obesity Back Pain    Fatigue       Assessment & Plan   Problem List Items Addressed This Visit       Class 3 severe obesity with serious comorbidity and body mass index (BMI) of 45.0 to 49.9 in adult, unspecified obesity type (H) - Primary     Last seen 2023.  Had a positive pregnancy 2023 and stopped all EOMs.  Gained 40 pounds during pregnancy.  And has gained another 20 during her postpartum.  She is currently pumping twice a day, but is in the process of completely weaning.    She is here today to discuss weight loss and is hoping to get back on an AOM.  She was previously on Saxenda for 1 month before finding out she was pregnant.  However, insurance is no longer covering GLP-1's.  [employer] employee.    She was previously on phentermine and got up to the 37.5 mg dose.  This was helpful with weight loss and some minimal side effects.  She would like to restart this.  She will reach out once she is no longer breast-feeding and we will send the prescription at that time.           Reach out once no longer breast feeding and will start phentermine 18.5mg - 1/2 tablet daily.   Sofia Crowder in 4 months       INTERVAL HISTORY:  New MWM - 2023. Started Phentermine and ramped up to 37.5mg. Also trialed Naltrexone, but was not  helpful overall. Did not feel like she was seeing consistent weight loss. Started Saxenda. Discontinued all medications 11/2023 after positive pregnancy test.       Currently has a 9 month baby boy. Currently pumping 2xday, is in the process of transitioning to formula. Was exclusively pumping.     Starting weight prior to pregancy was 278lb. Gained around 40lbs through pregnancy. Has then gained weight during breast feeding - was hungry a lot more. Has been really hard to regain all the weight she had previously lost. Weight is currently stable and is max weight in life.       Anti-obesity medication history    Current:   Phentermine - was really helpful for weight loss. No side effects.   Saxenda - was on it for around 1 month. Does not remember if it was helpful or if had side effects.       Recent diet changes: Trying to be more mindful of food choices and portion sizes. Feels like she is having some binge eating and larger portions. Felt like this was better controlled prior to pregnancy. Eating 3 meals a day. Feels like she then snacks throughout the day. Drinks 30oz water daily. ICE drinks.     Recent exercise/activity changes: very limited given schedule. Has been harder with weight gain as well.     Recent stressors: overall going well.     Recent sleep changes: no concerns         CURRENT WEIGHT:   340 lbs 0 oz                  Wt Readings from Last 5 Encounters:   04/15/25 (!) 154.2 kg (340 lb)   09/05/24 139.6 kg (307 lb 11.2 oz)   08/08/24 136.1 kg (300 lb)   07/27/24 145 kg (319 lb 10.7 oz)   07/16/24 143.8 kg (317 lb)             4/12/2025     2:07 PM   Changes and Difficulties   I have made the following changes to my diet since my last visit: no restrictions, eating whatever Luz been wanting   With regards to my diet, I am still struggling with: Binge eating, night cravings   I have made the following changes to my activity/exercise since my last visit: Not very active   With regards to my  activity/exercise, I am still struggling with: Feeling energized and motivated         MEDICATIONS:   Current Outpatient Medications   Medication Sig Dispense Refill    norethindrone (MICRONOR) 0.35 MG tablet Take 1 tablet (0.35 mg) by mouth daily. 1 tablet 3    acetaminophen (TYLENOL) 325 MG tablet Take 3 tablets (975 mg) by mouth every 6 hours (Patient not taking: Reported on 9/5/2024) 60 tablet 0    ibuprofen (ADVIL/MOTRIN) 800 MG tablet Take 1 tablet (800 mg) by mouth every 6 hours (Patient not taking: Reported on 9/5/2024) 60 tablet 0    Prenatal Vit-Fe Fumarate-FA (PRENATAL MULTIVITAMIN W/IRON) 27-0.8 MG tablet Take 1 tablet by mouth daily. Patient is due for office visit and/or labs before further refills. (Patient not taking: Reported on 4/15/2025) 90 tablet 0           4/12/2025     2:07 PM   Weight Loss Medication History Reviewed With Patient   Which weight loss medications are you currently taking on a regular basis? None   If you are not taking a weight loss medication that was prescribed to you, please indicate why: Other   Are you having any side effects from the weight loss medication that we have prescribed you? No           Objective    Wt (!) 154.2 kg (340 lb)   BMI 53.25 kg/m    Vitals - Patient Reported  Pain Score: No Pain (0)      Vitals:  No vitals were obtained today due to virtual visit.      PHYSICAL EXAM:  GENERAL: alert and no distress  EYES: Eyes grossly normal to inspection.  No discharge or erythema, or obvious scleral/conjunctival abnormalities.  RESP: No audible wheeze, cough, or visible cyanosis.    SKIN: Visible skin clear. No significant rash, abnormal pigmentation or lesions.  NEURO: Cranial nerves grossly intact.  Mentation and speech appropriate for age.  PSYCH: Appropriate affect, tone, and pace of words        Sincerely,    Sofia Villatoro PA-C      30 minutes spent by me on the date of the encounter doing chart review, history and exam, documentation and further  activities per the note    The longitudinal plan of care for the diagnosis(es)/condition(s) as documented were addressed during this visit. Due to the added complexity in care, I will continue to support Lenka in the subsequent management and with ongoing continuity of care.

## 2025-04-16 NOTE — ASSESSMENT & PLAN NOTE
Last seen 8/20/2023.  Had a positive pregnancy 11/20/2023 and stopped all EOMs.  Gained 40 pounds during pregnancy.  And has gained another 20 during her postpartum.  She is currently pumping twice a day, but is in the process of completely weaning.    She is here today to discuss weight loss and is hoping to get back on an AOM.  She was previously on Saxenda for 1 month before finding out she was pregnant.  However, insurance is no longer covering GLP-1's.  [employer] employee.    She was previously on phentermine and got up to the 37.5 mg dose.  This was helpful with weight loss and some minimal side effects.  She would like to restart this.  She will reach out once she is no longer breast-feeding and we will send the prescription at that time.

## 2025-04-16 NOTE — PATIENT INSTRUCTIONS
Visit Plan:     Reach out once no longer breast feeding and will start phentermine 18.5mg - 1/2 tablet daily.   Sofia Crowder in 4 months

## 2025-05-19 ENCOUNTER — MYC MEDICAL ADVICE (OUTPATIENT)
Dept: ENDOCRINOLOGY | Facility: CLINIC | Age: 30
End: 2025-05-19
Payer: COMMERCIAL

## 2025-05-19 DIAGNOSIS — E66.813 CLASS 3 SEVERE OBESITY WITH SERIOUS COMORBIDITY AND BODY MASS INDEX (BMI) OF 45.0 TO 49.9 IN ADULT, UNSPECIFIED OBESITY TYPE (H): Primary | ICD-10-CM

## 2025-05-20 ENCOUNTER — TELEPHONE (OUTPATIENT)
Dept: ENDOCRINOLOGY | Facility: CLINIC | Age: 30
End: 2025-05-20
Payer: COMMERCIAL

## 2025-05-20 RX ORDER — SEMAGLUTIDE 0.5 MG/.5ML
0.5 INJECTION, SOLUTION SUBCUTANEOUS WEEKLY
Qty: 2 ML | Refills: 2 | Status: SHIPPED | OUTPATIENT
Start: 2025-06-19

## 2025-05-20 RX ORDER — SEMAGLUTIDE 0.25 MG/.5ML
0.25 INJECTION, SOLUTION SUBCUTANEOUS WEEKLY
Qty: 2 ML | Refills: 0 | Status: SHIPPED | OUTPATIENT
Start: 2025-05-20

## 2025-05-20 NOTE — LETTER
"May 22, 2025    To: Pattie    RE: Lenka Adams  [address]  [facility] [address]  : 1995  MRN: [id number]    To Whom It May Concern,    I am writing on behalf of my patient, Lenka Adams  to document the medical necessity of Wegovy for the treatment of Obesityvsoverweight: Class III Obesity (BMI at least 40 kg/m2). This letter provides information about the patient's medical history and diagnosis and a statement summarizing my treatment rationale.     Summary of Patient History and Diagnosis  Lenka Adams is a 29 year old female with a diagnosis of Obesityvsoverweight: Class III Obesity (BMI at least 40 kg/m2).     Estimated body mass index is 53.25 kg/m  as calculated from the following:    Height as of 24: 1.702 m (5' 7\").    Weight as of 4/15/25: 154.2 kg (340 lb).    Wt Readings from Last 4 Encounters:   04/15/25 (!) 154.2 kg (340 lb)   24 139.6 kg (307 lb 11.2 oz)   24 136.1 kg (300 lb)   24 145 kg (319 lb 10.7 oz)       Treatment Rationale  Despite lifestyle/health improvements with nutrition, exercise, and behavioral changes for at least 12 months, the patient has been unable to achieve significant and sustainable weight loss.     Previous attempts with the below medications were unsuccessful due to intolerable side effects and/or are contraindicated:   Phentermine-took for 6 months along with diet and exercise regimen  Saxenda-took for 1 month until pregnancy  Naltrexone-not effective for weight loss    Wegovy (semaglutide) is an FDA-approved medication for chronic weight management in adults with a BMI of 30 or higher or a BMI of 27 or higher with weight-related comorbidity. The patient's BMI qualifies them for Wegovy, which has shown remarkable efficacy and a favorable safety profile. Patient has no history of pancreatitis. The patient has no personal or family history of medullary thyroid carcinoma or MEN2.     The patient's " unsuccessful weight management attempts and previous medication failures highlight the need for Wegovy as a medically necessary treatment option. Denying coverage would hinder the patient's progress and increase the risk of obesity-related health conditions.    I kindly request that you review Lenka's case and reconsider coverage for Wegovy as an integral part of their obesity treatment plan.     Duration  12 months     Summary  In summary, Wegovy is medically necessary for this patient s medical condition. Please call my office at [phone] if I can provide you with any additional information to approve my request. I look forward to receiving your timely response and approval of this request.     Sincerely,    Sofia Villatoro PA-C

## 2025-05-20 NOTE — TELEPHONE ENCOUNTER
The prescribed drug/drug class Wegovy is classified as a Plan Exclusion, meaning it is not covered under the plan for any indication.  Consequently, the liaison team will not be submitting a prior authorization for this drug or any drug within this weight loss. The order will be sent to the patient's preferred pharmacy, and the patient will need to pay out of pocket.

## 2025-05-21 NOTE — TELEPHONE ENCOUNTER
PA Initiation    Medication: WEGOVY 0.25 MG/0.5ML SC SOAJ  Insurance Company: [employer] - Phone [phone] Fax [phone]  Pharmacy Filling the Rx: [id number] [place], [place] - [address] AT [facility] [address]  Filling Pharmacy Phone: [phone]  Filling Pharmacy Fax: [phone]  Start Date: 5/21/2025

## 2025-05-22 NOTE — TELEPHONE ENCOUNTER
PRIOR AUTHORIZATION DENIED    Medication: WEGOVY 0.25 MG/0.5ML SC SOAJ  Insurance Company: [employer] - Phone [phone] Fax [phone]  Denial Date: 5/22/2025  Denial Reason(s):   Appeal Information:   Patient Notified: clinic to discuss with pt

## 2025-05-22 NOTE — TELEPHONE ENCOUNTER
Medication Appeal Request    Please initiate an appeal for the requested medication: WEGOVY 0.25 MG/0.5ML SC SOAJ    Has a letter of medical necessity been completed in EPIC?   yes    Any additional lab values/information to include?     Would you like to include any research articles?               If yes please include the hyperlink(s) below or fax to    [phone] for Specialty/Retail               [phone] for Infusion/Clinic Administered.                Include the patients name and MRN on the fax cover sheet.

## 2025-07-02 DIAGNOSIS — E66.813 CLASS 3 SEVERE OBESITY WITH SERIOUS COMORBIDITY AND BODY MASS INDEX (BMI) OF 45.0 TO 49.9 IN ADULT, UNSPECIFIED OBESITY TYPE (H): ICD-10-CM

## 2025-07-02 RX ORDER — SEMAGLUTIDE 0.5 MG/.5ML
0.5 INJECTION, SOLUTION SUBCUTANEOUS WEEKLY
Qty: 2 ML | Refills: 2 | Status: CANCELLED | OUTPATIENT
Start: 2025-07-02

## 2025-07-02 RX ORDER — SEMAGLUTIDE 0.5 MG/.5ML
0.5 INJECTION, SOLUTION SUBCUTANEOUS WEEKLY
Qty: 2 ML | Refills: 2 | Status: SHIPPED | OUTPATIENT
Start: 2025-07-02

## 2025-07-02 NOTE — TELEPHONE ENCOUNTER
Hello, we tried to transfer this rx from [facility], but were on hold for a long time. Could a new rx be sent to [facility]? Thanks!

## (undated) DEVICE — GLOVE PROTEXIS BLUE W/NEU-THERA 7.5  2D73EB75

## (undated) DEVICE — GLOVE ESTEEM POWDER FREE SMT 7.5  2D72PT75

## (undated) DEVICE — SU VICRYL 0 CT-1 36" J346H

## (undated) DEVICE — SU VICRYL 4-0 KS 27" UND J662H

## (undated) DEVICE — CATH TRAY FOLEY 16FR BARDEX W/DRAIN BAG STATLOCK 300316A

## (undated) DEVICE — ESU PENCIL W/SMOKE EVAC NEPTUNE STRYKER 0703-046-000

## (undated) DEVICE — STRAP KNEE/BODY 31143004

## (undated) DEVICE — SU MONOCRYL 0 CTB-1 36" YB946

## (undated) DEVICE — PACK C-SECTION LF PL15OTA83B

## (undated) DEVICE — SOL WATER IRRIG 1000ML BOTTLE 07139-09

## (undated) DEVICE — ESU GROUND PAD UNIVERSAL W/O CORD

## (undated) DEVICE — PREP CHLORAPREP 26ML TINTED ORANGE  260815

## (undated) DEVICE — STOCKING SLEEVE COMPRESSION CALF LG

## (undated) DEVICE — RETR VISCERA FISH

## (undated) DEVICE — SOL NACL 0.9% IRRIG 1000ML BOTTLE 07138-09

## (undated) RX ORDER — FENTANYL CITRATE 50 UG/ML
INJECTION, SOLUTION INTRAMUSCULAR; INTRAVENOUS
Status: DISPENSED
Start: 2024-07-25

## (undated) RX ORDER — MORPHINE SULFATE 1 MG/ML
INJECTION, SOLUTION EPIDURAL; INTRATHECAL; INTRAVENOUS
Status: DISPENSED
Start: 2024-07-25